# Patient Record
Sex: MALE | Race: WHITE | NOT HISPANIC OR LATINO | Employment: FULL TIME | URBAN - METROPOLITAN AREA
[De-identification: names, ages, dates, MRNs, and addresses within clinical notes are randomized per-mention and may not be internally consistent; named-entity substitution may affect disease eponyms.]

---

## 2017-03-09 NOTE — PROGRESS NOTES
SUBJECTIVE:                                                    Erick Yusuf is a 66 year old male who presents to clinic today for the following health issues:      Hyperlipidemia Follow-Up      Rate your low fat/cholesterol diet?: good    Taking statin?  Yes, no muscle aches from statin    Other lipid medications/supplements?:  none     Hypertension Follow-up      Outpatient blood pressures are not being checked.    Low Salt Diet: no added salt       Amount of exercise or physical activity: None    Problems taking medications regularly: No    Medication side effects: none  Diet: regular (no restrictions)    Recheck of medications:   Simvastatin for cholesterol. Lisinopril and HCTZ for HTN.  Is at goal for blood pressure. No side effects from his medications. Is due for recheck of cholesterol.     Nonsmoker. Blood pressure is to goal today.     No chest pain, shortness of breath, edema, dizziness, orthopnea, or PND.     No vision changes.     Due for fit card.       ENT Symptoms             Symptoms: cc Present Absent Comment   Fever/Chills  x     Fatigue  x     Muscle Aches  x     Eye Irritation  x     Sneezing  x     Nasal John/Drg  x     Sinus Pressure/Pain  x     Loss of smell   x    Dental pain   x    Sore Throat   x    Swollen Glands   x    Ear Pain/Fullness  x     Cough  x     Wheeze  x     Chest Pain   x    Shortness of breath   x    Rash   x    Other   x      Symptom duration:  x 2 weeks   Symptom severity:  mild   Treatments tried:  none   Contacts:  none       Has used z-pack in the past. Cough not any better after two weeks.  Green sputum. Eating and drinking okay.  Has sinus congestion also. Has never needed inhaler. No h/o pneumonia.     Due for fasting labs and fit card.     Has BPH, uses flomax, due for PSA his PCP has been following this also.             Problem list and histories reviewed & adjusted, as indicated.  Additional history: as documented    Patient Active Problem List  "  Diagnosis     HYPERLIPIDEMIA LDL GOAL <130     Hypertension goal BP (blood pressure) < 140/90     Advanced directives, counseling/discussion     Benign non-nodular prostatic hyperplasia without lower urinary tract symptoms     Past Surgical History   Procedure Laterality Date     No history of surgery         Social History   Substance Use Topics     Smoking status: Never Smoker     Smokeless tobacco: Never Used     Alcohol use Yes      Comment: occasionally     Family History   Problem Relation Age of Onset     DIABETES Father      HEART DISEASE Brother          Current Outpatient Prescriptions   Medication Sig Dispense Refill     simvastatin (ZOCOR) 20 MG tablet Take 1 tablet (20 mg) by mouth At Bedtime 90 tablet 1     tamsulosin (FLOMAX) 0.4 MG capsule Take 1 capsule (0.4 mg) by mouth daily 90 capsule 1     hydrochlorothiazide (HYDRODIURIL) 25 MG tablet Take 1 tablet (25 mg) by mouth daily 90 tablet 1     lisinopril (PRINIVIL/ZESTRIL) 5 MG tablet Take 1 tablet (5 mg) by mouth daily 90 tablet 1     azithromycin (ZITHROMAX) 250 MG tablet Two tablets first day, then one tablet daily for four days. 6 tablet 0     multivitamin, therapeutic with minerals (MULTI-VITAMIN) TABS Take 1 tablet by mouth daily.       [DISCONTINUED] simvastatin (ZOCOR) 20 MG tablet Take 1 tablet (20 mg) by mouth At Bedtime 90 tablet 0     [DISCONTINUED] hydrochlorothiazide (HYDRODIURIL) 25 MG tablet Take 1 tablet (25 mg) by mouth daily 90 tablet 0     [DISCONTINUED] lisinopril (PRINIVIL/ZESTRIL) 5 MG tablet Take 1 tablet (5 mg) by mouth daily 90 tablet 0     No Known Allergies    ROS:  Constitutional, HEENT, cardiovascular, pulmonary, gi and gu systems are negative, except as otherwise noted.    OBJECTIVE:                                                    /84  Pulse 71  Temp 97.8  F (36.6  C) (Oral)  Ht 5' 11\" (1.803 m)  Wt 206 lb (93.4 kg)  SpO2 97%  BMI 28.73 kg/m2  Body mass index is 28.73 kg/(m^2).  GENERAL:  No acute distress.  " Interacts appropriately.  Breathing without difficulty.  Alert.  HEENT:  Tympanic membranes intact without effusion or erythema.  Oral mucosa moist. Posterior pharynx has no erythema.  Posterior pharynx has no exudate. Without edema.  NECK:  Soft and supple.  without tenderness.  Without lymphadenopathy.  Normal range of motion.    CARDIAC:   Regular rate and rhythm.  No murmurs, rubs, or gallops.   PULMONARY: Clear to auscultation bilaterally.  No  wheezes, crackles, or rhonchi.  Normal air exchange/breath sounds.  No use of accessory muscles.    PSYCH: Normal affect.  SKIN: No rashes.             ASSESSMENT/PLAN:                                                    ASSESSMENT / PLAN:  (I10) Benign essential hypertension  (primary encounter diagnosis)  Comment: at goal recheck per PCP  Plan: COMPREHENSIVE METABOLIC PANEL            (R05) Cough  Comment:   Plan: azithromycin (ZITHROMAX) 250 MG tablet        Discussed viral versus bacterial, patient would like to treat with z-geni    (E78.00) Hypercholesteremia  Comment:   Plan: Lipid panel reflex to direct LDL, simvastatin         (ZOCOR) 20 MG tablet            (Z12.11) Screen for colon cancer  Comment:   Plan: Fecal colorectal cancer screen FIT - Future         (S+30)            (Z11.59) Need for hepatitis C screening test  Comment:   Plan: Hepatitis C Screen Reflex to HCV RNA Quant and         Genotype            (Z23) Need for prophylactic vaccination against Streptococcus pneumoniae (pneumococcus)  Comment:   Plan:     (N40.0) Benign non-nodular prostatic hyperplasia without lower urinary tract symptoms  Comment:   Plan: tamsulosin (FLOMAX) 0.4 MG capsule            (I10) Hypertension goal BP (blood pressure) < 140/90  Comment:   Plan: hydrochlorothiazide (HYDRODIURIL) 25 MG tablet,        lisinopril (PRINIVIL/ZESTRIL) 5 MG tablet            (Z12.5) Screening for prostate cancer  Comment:   Plan: PSA, screen                MOHSEN Servin  [facility]

## 2017-03-10 ENCOUNTER — OFFICE VISIT (OUTPATIENT)
Dept: FAMILY MEDICINE | Facility: CLINIC | Age: 67
End: 2017-03-10
Payer: COMMERCIAL

## 2017-03-10 VITALS
WEIGHT: 206 LBS | OXYGEN SATURATION: 97 % | HEIGHT: 71 IN | TEMPERATURE: 97.8 F | HEART RATE: 71 BPM | DIASTOLIC BLOOD PRESSURE: 84 MMHG | SYSTOLIC BLOOD PRESSURE: 132 MMHG | BODY MASS INDEX: 28.84 KG/M2

## 2017-03-10 DIAGNOSIS — E78.00 HYPERCHOLESTEREMIA: ICD-10-CM

## 2017-03-10 DIAGNOSIS — R05.9 COUGH: ICD-10-CM

## 2017-03-10 DIAGNOSIS — I10 BENIGN ESSENTIAL HYPERTENSION: Primary | ICD-10-CM

## 2017-03-10 DIAGNOSIS — I10 HYPERTENSION GOAL BP (BLOOD PRESSURE) < 140/90: ICD-10-CM

## 2017-03-10 DIAGNOSIS — Z12.5 SCREENING FOR PROSTATE CANCER: ICD-10-CM

## 2017-03-10 DIAGNOSIS — Z11.59 NEED FOR HEPATITIS C SCREENING TEST: ICD-10-CM

## 2017-03-10 DIAGNOSIS — N40.0 BENIGN NON-NODULAR PROSTATIC HYPERPLASIA WITHOUT LOWER URINARY TRACT SYMPTOMS: ICD-10-CM

## 2017-03-10 DIAGNOSIS — Z12.11 SCREEN FOR COLON CANCER: ICD-10-CM

## 2017-03-10 DIAGNOSIS — Z23 NEED FOR PROPHYLACTIC VACCINATION AGAINST STREPTOCOCCUS PNEUMONIAE (PNEUMOCOCCUS): ICD-10-CM

## 2017-03-10 LAB
ALBUMIN SERPL-MCNC: 3.7 G/DL (ref 3.4–5)
ALP SERPL-CCNC: 79 U/L (ref 40–150)
ALT SERPL W P-5'-P-CCNC: 25 U/L (ref 0–70)
ANION GAP SERPL CALCULATED.3IONS-SCNC: 6 MMOL/L (ref 3–14)
AST SERPL W P-5'-P-CCNC: 17 U/L (ref 0–45)
BILIRUB SERPL-MCNC: 0.7 MG/DL (ref 0.2–1.3)
BUN SERPL-MCNC: 21 MG/DL (ref 7–30)
CALCIUM SERPL-MCNC: 9 MG/DL (ref 8.5–10.1)
CHLORIDE SERPL-SCNC: 108 MMOL/L (ref 94–109)
CHOLEST SERPL-MCNC: 162 MG/DL
CO2 SERPL-SCNC: 29 MMOL/L (ref 20–32)
CREAT SERPL-MCNC: 1.28 MG/DL (ref 0.66–1.25)
GFR SERPL CREATININE-BSD FRML MDRD: 56 ML/MIN/1.7M2
GLUCOSE SERPL-MCNC: 108 MG/DL (ref 70–99)
HCV AB SERPL QL IA: NORMAL
HDLC SERPL-MCNC: 57 MG/DL
LDLC SERPL CALC-MCNC: 91 MG/DL
NONHDLC SERPL-MCNC: 105 MG/DL
POTASSIUM SERPL-SCNC: 4.1 MMOL/L (ref 3.4–5.3)
PROT SERPL-MCNC: 6.6 G/DL (ref 6.8–8.8)
PSA SERPL-ACNC: 4.23 UG/L (ref 0–4)
SODIUM SERPL-SCNC: 143 MMOL/L (ref 133–144)
TRIGL SERPL-MCNC: 69 MG/DL

## 2017-03-10 PROCEDURE — 99214 OFFICE O/P EST MOD 30 MIN: CPT | Performed by: PHYSICIAN ASSISTANT

## 2017-03-10 PROCEDURE — G0103 PSA SCREENING: HCPCS | Performed by: PHYSICIAN ASSISTANT

## 2017-03-10 PROCEDURE — 86803 HEPATITIS C AB TEST: CPT | Performed by: PHYSICIAN ASSISTANT

## 2017-03-10 PROCEDURE — 80053 COMPREHEN METABOLIC PANEL: CPT | Performed by: PHYSICIAN ASSISTANT

## 2017-03-10 PROCEDURE — 36415 COLL VENOUS BLD VENIPUNCTURE: CPT | Performed by: PHYSICIAN ASSISTANT

## 2017-03-10 PROCEDURE — 80061 LIPID PANEL: CPT | Performed by: PHYSICIAN ASSISTANT

## 2017-03-10 RX ORDER — LISINOPRIL 5 MG/1
5 TABLET ORAL DAILY
Qty: 90 TABLET | Refills: 1 | Status: SHIPPED | OUTPATIENT
Start: 2017-03-10 | End: 2017-03-13

## 2017-03-10 RX ORDER — TAMSULOSIN HYDROCHLORIDE 0.4 MG/1
0.4 CAPSULE ORAL DAILY
Qty: 90 CAPSULE | Refills: 1 | Status: SHIPPED | OUTPATIENT
Start: 2017-03-10 | End: 2017-03-13

## 2017-03-10 RX ORDER — HYDROCHLOROTHIAZIDE 25 MG/1
25 TABLET ORAL DAILY
Qty: 90 TABLET | Refills: 1 | Status: SHIPPED | OUTPATIENT
Start: 2017-03-10 | End: 2017-03-13

## 2017-03-10 RX ORDER — AZITHROMYCIN 250 MG/1
TABLET, FILM COATED ORAL
Qty: 6 TABLET | Refills: 0 | Status: SHIPPED | OUTPATIENT
Start: 2017-03-10 | End: 2017-06-23

## 2017-03-10 RX ORDER — SIMVASTATIN 20 MG
20 TABLET ORAL AT BEDTIME
Qty: 90 TABLET | Refills: 1 | Status: SHIPPED | OUTPATIENT
Start: 2017-03-10 | End: 2017-03-13

## 2017-03-10 NOTE — NURSING NOTE
"Chief Complaint   Patient presents with     Hypertension     medication check and refill     Cough     coughing per pt x 2 weeks now        Initial /84  Pulse 71  Temp 97.8  F (36.6  C) (Oral)  Ht 5' 11\" (1.803 m)  Wt 206 lb (93.4 kg)  SpO2 97%  BMI 28.73 kg/m2 Estimated body mass index is 28.73 kg/(m^2) as calculated from the following:    Height as of this encounter: 5' 11\" (1.803 m).    Weight as of this encounter: 206 lb (93.4 kg).  Medication Reconciliation: complete      Natasha Kaur MA    "

## 2017-03-10 NOTE — MR AVS SNAPSHOT
After Visit Summary   3/10/2017    Erick Yusuf    MRN: [id number]           Patient Information     Date Of Birth          1950        Visit Information        Provider Department      3/10/2017 7:20 AM Vida Singleton PA-C [facility]        Today's Diagnoses     Benign essential hypertension    -  1    Cough        Hypercholesteremia        Screen for colon cancer        Need for hepatitis C screening test        Need for prophylactic vaccination against Streptococcus pneumoniae (pneumococcus)        Benign non-nodular prostatic hyperplasia without lower urinary tract symptoms        Hypertension goal BP (blood pressure) < 140/90        Screening for prostate cancer           Follow-ups after your visit        Future tests that were ordered for you today     Open Future Orders        Priority Expected Expires Ordered    Fecal colorectal cancer screen FIT - Future (S+30) Routine 3/30/2017 4/8/2017 3/10/2017            Who to contact     If you have questions or need follow up information about today's clinic visit or your schedule please contact [facility] directly at [phone].  Normal or non-critical lab and imaging results will be communicated to you by Doctor Funhart, letter or phone within 4 business days after the clinic has received the results. If you do not hear from us within 7 days, please contact the clinic through [employer] or phone. If you have a critical or abnormal lab result, we will notify you by phone as soon as possible.  Submit refill requests through [employer] or call your pharmacy and they will forward the refill request to us. Please allow 3 business days for your refill to be completed.          Additional Information About Your Visit        MyChart Information     [employer] gives you secure access to your electronic health record. If you see a primary care provider, you can also send messages to your care team and make appointments. If  "you have questions, please call your primary care clinic.  If you do not have a primary care provider, please call [phone] and they will assist you.        Care EveryWhere ID     This is your Care EveryWhere ID. This could be used by other organizations to access your [facility] medical records  [id number]        Your Vitals Were     Pulse Temperature Height Pulse Oximetry BMI (Body Mass Index)       71 97.8  F (36.6  C) (Oral) 5' 11\" (1.803 m) 97% 28.73 kg/m2        Blood Pressure from Last 3 Encounters:   03/10/17 132/84   04/19/16 135/80   12/10/15 133/75    Weight from Last 3 Encounters:   03/10/17 206 lb (93.4 kg)   04/19/16 203 lb 3.2 oz (92.2 kg)   12/10/15 200 lb (90.7 kg)              We Performed the Following     COMPREHENSIVE METABOLIC PANEL     Hepatitis C Screen Reflex to HCV RNA Quant and Genotype     Lipid panel reflex to direct LDL     PSA, screen          Today's Medication Changes          These changes are accurate as of: 3/10/17  7:25 AM.  If you have any questions, ask your nurse or doctor.               Start taking these medicines.        Dose/Directions    azithromycin 250 MG tablet   Commonly known as:  ZITHROMAX   Used for:  Cough   Started by:  Vida Singleton PA-C        Two tablets first day, then one tablet daily for four days.   Quantity:  6 tablet   Refills:  0            Where to get your medicines      These medications were sent to [place] [address] [place], [place] - [address] [facility]  [address] [facility] [address]     Phone:  [phone]     azithromycin 250 MG tablet    hydrochlorothiazide 25 MG tablet    lisinopril 5 MG tablet    simvastatin 20 MG tablet    tamsulosin 0.4 MG capsule                Primary Care Provider Office Phone # Fax #    Sancho Rushing [phone]       [facility] [address] [facility] [address]        Thank you!     Thank you for choosing [facility]  for your " care. Our goal is always to provide you with excellent care. Hearing back from our patients is one way we can continue to improve our services. Please take a few minutes to complete the written survey that you may receive in the mail after your visit with us. Thank you!             Your Updated Medication List - Protect others around you: Learn how to safely use, store and throw away your medicines at www.disposemymeds.org.          This list is accurate as of: 3/10/17  7:25 AM.  Always use your most recent med list.                   Brand Name Dispense Instructions for use    azithromycin 250 MG tablet    ZITHROMAX    6 tablet    Two tablets first day, then one tablet daily for four days.       hydrochlorothiazide 25 MG tablet    HYDRODIURIL    90 tablet    Take 1 tablet (25 mg) by mouth daily       lisinopril 5 MG tablet    PRINIVIL/ZESTRIL    90 tablet    Take 1 tablet (5 mg) by mouth daily       Multi-vitamin Tabs tablet      Take 1 tablet by mouth daily.       simvastatin 20 MG tablet    ZOCOR    90 tablet    Take 1 tablet (20 mg) by mouth At Bedtime       tamsulosin 0.4 MG capsule    FLOMAX    90 capsule    Take 1 capsule (0.4 mg) by mouth daily

## 2017-03-11 NOTE — PROGRESS NOTES
PLEASE CALL PATIENT:   Dear Erick,      It was a pleasure to see you at your recent office visit.  Your test results are listed below.  Hepatitis C screen negative. Prostate screen looks very stable. Blood sugar is about the same, just mildly elevated. You do not have diabetes.  Kidney function was decreased somewhat, this can be if you did not drink a lot of water before your bloodwork. Please recheck kidney function after drinking lots of water in about a month. Future lab was placed. Liver enzymes normal. Cholesterol looks GREAT.         If you have any questions or concerns, please call the clinic at [phone].    Sincerely,  Vida Singleton PA-C

## 2017-03-13 ENCOUNTER — TELEPHONE (OUTPATIENT)
Dept: FAMILY MEDICINE | Facility: CLINIC | Age: 67
End: 2017-03-13

## 2017-03-13 NOTE — TELEPHONE ENCOUNTER
So states that patient's tamsulosin, hydrocholothyazide, lisinopril, and simvistatin were all supposed to go to [facility] at [phone].    Thank you.

## 2017-03-13 NOTE — TELEPHONE ENCOUNTER
Notes Recorded by Vida Singleton PA-C on 3/10/2017 at 6:12 PM  PLEASE CALL PATIENT:   Dear Erick,      It was a pleasure to see you at your recent office visit.  Your test results are listed below.  Hepatitis C screen negative. Prostate screen looks very stable. Blood sugar is about the same, just mildly elevated. You do not have diabetes.  Kidney function was decreased somewhat, this can be if you did not drink a lot of water before your bloodwork. Please recheck kidney function after drinking lots of water in about a month. Future lab was placed. Liver enzymes normal. Cholesterol looks GREAT.         If you have any questions or concerns, please call the clinic at [phone].    Sincerely,  Vida Singleton PA-C

## 2017-03-14 NOTE — TELEPHONE ENCOUNTER
Consent on file to speak with wife, Kaley.   Informed Kaley of result note as below and to have patient drink lots of water - 6-8 full glasses of water/day.   Recheck kidney function in 1 month  Kaley verbalized understanding.  .Felisha CONNERN, RN, CPN

## 2017-03-21 ENCOUNTER — TELEPHONE (OUTPATIENT)
Dept: FAMILY MEDICINE | Facility: CLINIC | Age: 67
End: 2017-03-21

## 2017-03-21 NOTE — TELEPHONE ENCOUNTER
Panel Management Review      Patient has the following on his problem list:     Hypertension   Last three blood pressure readings:  BP Readings from Last 3 Encounters:   03/10/17 132/84   04/19/16 135/80   12/10/15 133/75     Blood pressure: Passed    HTN Guidelines:  Age 18-59 BP range:  Less than 140/90  Age 60-85 with Diabetes:  Less than 140/90  Age 60-85 without Diabetes:  less than 150/90      Composite cancer screening  Chart review shows that this patient is due/due soon for the following Fecal Colorectal (FIT)  Summary:    Patient is due/failing the following:   FIT    Action needed:   Patient needs non-fasting lab only appointment    Type of outreach:    None. Fit test just ordered 3/10/17    Questions for provider review:    None                                                                                                                                    Luis Manuel Stafford CMA       Chart routed to closed .

## 2017-06-23 ENCOUNTER — OFFICE VISIT (OUTPATIENT)
Dept: FAMILY MEDICINE | Facility: CLINIC | Age: 67
End: 2017-06-23
Payer: COMMERCIAL

## 2017-06-23 VITALS
OXYGEN SATURATION: 96 % | BODY MASS INDEX: 28.17 KG/M2 | DIASTOLIC BLOOD PRESSURE: 70 MMHG | SYSTOLIC BLOOD PRESSURE: 122 MMHG | HEART RATE: 66 BPM | TEMPERATURE: 98.5 F | WEIGHT: 202 LBS

## 2017-06-23 DIAGNOSIS — R05.9 COUGH: ICD-10-CM

## 2017-06-23 PROCEDURE — 99213 OFFICE O/P EST LOW 20 MIN: CPT | Performed by: FAMILY MEDICINE

## 2017-06-23 RX ORDER — AZITHROMYCIN 250 MG/1
TABLET, FILM COATED ORAL
Qty: 6 TABLET | Refills: 0 | Status: SHIPPED | OUTPATIENT
Start: 2017-06-23 | End: 2017-06-29

## 2017-06-23 RX ORDER — CODEINE PHOSPHATE AND GUAIFENESIN 10; 100 MG/5ML; MG/5ML
1 SOLUTION ORAL
Qty: 120 ML | Refills: 0 | Status: SHIPPED | OUTPATIENT
Start: 2017-06-23 | End: 2017-11-07

## 2017-06-23 NOTE — NURSING NOTE
"Chief Complaint   Patient presents with     Cough       Initial /70  Pulse 66  Temp 98.5  F (36.9  C) (Oral)  Wt 202 lb (91.6 kg)  SpO2 96%  BMI 28.17 kg/m2 Estimated body mass index is 28.17 kg/(m^2) as calculated from the following:    Height as of 3/10/17: 5' 11\" (1.803 m).    Weight as of this encounter: 202 lb (91.6 kg).  Medication Reconciliation: complete   Susna Smith CMA    "
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
## 2017-06-23 NOTE — MR AVS SNAPSHOT
After Visit Summary   6/23/2017    Erick Yusuf    MRN: [id number]           Patient Information     Date Of Birth          1950        Visit Information        Provider Department      6/23/2017 4:00 PM Mehdi De Leon MD [facility]        Today's Diagnoses     Cough           Follow-ups after your visit        Additional Services     ALLERGY/ASTHMA ADULT REFERRAL       Your provider has referred you to: [facility]: [facility]  [phone] [email or web address]    Please be aware that coverage of these services is subject to the terms and limitations of your health insurance plan.  Call member services at your health plan with any benefit or coverage questions.      Please bring the following with you to your appointment:    (1) Any X-Rays, CTs or MRIs which have been performed.  Contact the facility where they were done to arrange for  prior to your scheduled appointment.    (2) List of current medications  (3) This referral request   (4) Any documents/labs given to you for this referral                  Who to contact     If you have questions or need follow up information about today's clinic visit or your schedule please contact [facility] directly at [phone].  Normal or non-critical lab and imaging results will be communicated to you by MyChart, letter or phone within 4 business days after the clinic has received the results. If you do not hear from us within 7 days, please contact the clinic through MyChart or phone. If you have a critical or abnormal lab result, we will notify you by phone as soon as possible.  Submit refill requests through [employer] or call your pharmacy and they will forward the refill request to us. Please allow 3 business days for your refill to be completed.          Additional Information About Your Visit        MyChart Information     [employer] gives you secure access to your  electronic health record. If you see a primary care provider, you can also send messages to your care team and make appointments. If you have questions, please call your primary care clinic.  If you do not have a primary care provider, please call [phone] and they will assist you.        Care EveryWhere ID     This is your Care EveryWhere ID. This could be used by other organizations to access your [facility] medical records  [id number]        Your Vitals Were     Pulse Temperature Pulse Oximetry BMI (Body Mass Index)          66 98.5  F (36.9  C) (Oral) 96% 28.17 kg/m2         Blood Pressure from Last 3 Encounters:   06/23/17 122/70   03/10/17 132/84   04/19/16 135/80    Weight from Last 3 Encounters:   06/23/17 202 lb (91.6 kg)   03/10/17 206 lb (93.4 kg)   04/19/16 203 lb 3.2 oz (92.2 kg)              We Performed the Following     ALLERGY/ASTHMA ADULT REFERRAL          Today's Medication Changes          These changes are accurate as of: 6/23/17  5:38 PM.  If you have any questions, ask your nurse or doctor.               Start taking these medicines.        Dose/Directions    guaiFENesin-codeine 100-10 MG/5ML Soln solution   Commonly known as:  ROBITUSSIN AC   Used for:  Cough   Started by:  Mehdi De Leon MD        Dose:  1 tsp.   Take 5 mLs by mouth nightly as needed   Quantity:  120 mL   Refills:  0            Where to get your medicines      These medications were sent to [place] [address] [place] [facility] [address] [facility]  [address] [facility] [address]     Phone:  [phone]     azithromycin 250 MG tablet         Some of these will need a paper prescription and others can be bought over the counter.  Ask your nurse if you have questions.     Bring a paper prescription for each of these medications     guaiFENesin-codeine 100-10 MG/5ML Soln solution                Primary Care Provider Office Phone # Fax #    Sancho Rushing [phone]       [place]  [facility] [address] [facility] [address]        Equal Access to Services     MALDONADO ROACH : Hadii dony martinez hadpujao Soveraali, waaxda luqadaha, qaybta kaalmada margotericmary grace, jayashree berry haymelodie albertsdelorestyler fields. So [facility] [phone].    ATENCIÓN: Si habla español, tiene a swift disposición servicios gratuitos de asistencia lingüística. Llame al [phone].    We comply with applicable federal civil rights laws and [place] laws. We do not discriminate on the basis of race, color, national origin, age, disability sex, sexual orientation or gender identity.            Thank you!     Thank you for choosing [facility]  for your care. Our goal is always to provide you with excellent care. Hearing back from our patients is one way we can continue to improve our services. Please take a few minutes to complete the written survey that you may receive in the mail after your visit with us. Thank you!             Your Updated Medication List - Protect others around you: Learn how to safely use, store and throw away your medicines at www.disposemymeds.org.          This list is accurate as of: 6/23/17  5:38 PM.  Always use your most recent med list.                   Brand Name Dispense Instructions for use Diagnosis    azithromycin 250 MG tablet    ZITHROMAX    6 tablet    Two tablets first day, then one tablet daily for four days.    Cough       guaiFENesin-codeine 100-10 MG/5ML Soln solution    ROBITUSSIN AC    120 mL    Take 5 mLs by mouth nightly as needed    Cough       hydrochlorothiazide 25 MG tablet    HYDRODIURIL    90 tablet    Take 1 tablet (25 mg) by mouth daily    Hypertension goal BP (blood pressure) < 140/90       lisinopril 5 MG tablet    PRINIVIL/ZESTRIL    90 tablet    Take 1 tablet (5 mg) by mouth daily    Hypertension goal BP (blood pressure) < 140/90       Multi-vitamin Tabs tablet      Take 1 tablet by mouth daily.        simvastatin 20 MG tablet    ZOCOR    90 tablet    Take 1  tablet (20 mg) by mouth At Bedtime    Hypercholesteremia       tamsulosin 0.4 MG capsule    FLOMAX    90 capsule    Take 1 capsule (0.4 mg) by mouth daily    Benign non-nodular prostatic hyperplasia without lower urinary tract symptoms

## 2017-06-29 DIAGNOSIS — R05.9 COUGH: ICD-10-CM

## 2017-06-29 RX ORDER — AZITHROMYCIN 250 MG/1
TABLET, FILM COATED ORAL
Qty: 6 TABLET | Refills: 0 | Status: SHIPPED | OUTPATIENT
Start: 2017-06-29 | End: 2017-11-07

## 2017-09-28 DIAGNOSIS — E78.00 HYPERCHOLESTEREMIA: ICD-10-CM

## 2017-09-28 DIAGNOSIS — N40.0 BENIGN NON-NODULAR PROSTATIC HYPERPLASIA WITHOUT LOWER URINARY TRACT SYMPTOMS: ICD-10-CM

## 2017-09-28 DIAGNOSIS — I10 HYPERTENSION GOAL BP (BLOOD PRESSURE) < 140/90: ICD-10-CM

## 2017-09-28 RX ORDER — TAMSULOSIN HYDROCHLORIDE 0.4 MG/1
0.4 CAPSULE ORAL DAILY
Qty: 90 CAPSULE | Refills: 1 | Status: SHIPPED | OUTPATIENT
Start: 2017-09-28 | End: 2017-11-07

## 2017-09-28 RX ORDER — LISINOPRIL 5 MG/1
5 TABLET ORAL DAILY
Qty: 30 TABLET | Refills: 0 | Status: SHIPPED | OUTPATIENT
Start: 2017-09-28 | End: 2017-11-07 | Stop reason: ALTCHOICE

## 2017-09-28 RX ORDER — SIMVASTATIN 20 MG
20 TABLET ORAL AT BEDTIME
Qty: 90 TABLET | Refills: 1 | Status: SHIPPED | OUTPATIENT
Start: 2017-09-28 | End: 2017-11-07

## 2017-09-28 RX ORDER — HYDROCHLOROTHIAZIDE 25 MG/1
25 TABLET ORAL DAILY
Qty: 30 TABLET | Refills: 0 | Status: SHIPPED | OUTPATIENT
Start: 2017-09-28 | End: 2017-11-07

## 2017-09-28 NOTE — LETTER
September 29, 2017    Erick Yusuf  [address] [facility] [address]    Dear Erick,       We recently received a refill request for hydrochlorothiazide, lisinopril, simvastatin and tamsulosin.  We have refilled this for a one time 30 day supply only because you are due for a:    Blood pressure office visit with provider and fasting lab appointment      Please schedule this lab appointment 4-5 days prior to the office visit.     Please call at your earliest convenience so that there will not be a delay with your future refills.          Thank you,   Your [facility] Team/  [phone]

## 2017-11-07 ENCOUNTER — OFFICE VISIT (OUTPATIENT)
Dept: FAMILY MEDICINE | Facility: CLINIC | Age: 67
End: 2017-11-07
Payer: COMMERCIAL

## 2017-11-07 VITALS
DIASTOLIC BLOOD PRESSURE: 75 MMHG | WEIGHT: 204.8 LBS | SYSTOLIC BLOOD PRESSURE: 135 MMHG | TEMPERATURE: 97 F | HEART RATE: 63 BPM | BODY MASS INDEX: 28.56 KG/M2

## 2017-11-07 DIAGNOSIS — I10 HYPERTENSION GOAL BP (BLOOD PRESSURE) < 140/90: ICD-10-CM

## 2017-11-07 DIAGNOSIS — Z12.11 SPECIAL SCREENING FOR MALIGNANT NEOPLASMS, COLON: Primary | ICD-10-CM

## 2017-11-07 DIAGNOSIS — N40.0 BENIGN NON-NODULAR PROSTATIC HYPERPLASIA WITHOUT LOWER URINARY TRACT SYMPTOMS: ICD-10-CM

## 2017-11-07 DIAGNOSIS — E78.00 HYPERCHOLESTEREMIA: ICD-10-CM

## 2017-11-07 LAB
ANION GAP SERPL CALCULATED.3IONS-SCNC: 6 MMOL/L (ref 3–14)
BUN SERPL-MCNC: 24 MG/DL (ref 7–30)
CALCIUM SERPL-MCNC: 9.5 MG/DL (ref 8.5–10.1)
CHLORIDE SERPL-SCNC: 106 MMOL/L (ref 94–109)
CO2 SERPL-SCNC: 30 MMOL/L (ref 20–32)
CREAT SERPL-MCNC: 1.25 MG/DL (ref 0.66–1.25)
GFR SERPL CREATININE-BSD FRML MDRD: 58 ML/MIN/1.7M2
GLUCOSE SERPL-MCNC: 99 MG/DL (ref 70–99)
POTASSIUM SERPL-SCNC: 4.2 MMOL/L (ref 3.4–5.3)
SODIUM SERPL-SCNC: 142 MMOL/L (ref 133–144)

## 2017-11-07 PROCEDURE — 80048 BASIC METABOLIC PNL TOTAL CA: CPT | Performed by: FAMILY MEDICINE

## 2017-11-07 PROCEDURE — 36415 COLL VENOUS BLD VENIPUNCTURE: CPT | Performed by: FAMILY MEDICINE

## 2017-11-07 PROCEDURE — 99214 OFFICE O/P EST MOD 30 MIN: CPT | Performed by: FAMILY MEDICINE

## 2017-11-07 RX ORDER — LISINOPRIL 5 MG/1
5 TABLET ORAL DAILY
Qty: 30 TABLET | Refills: 0 | Status: CANCELLED | OUTPATIENT
Start: 2017-11-07

## 2017-11-07 RX ORDER — TAMSULOSIN HYDROCHLORIDE 0.4 MG/1
0.4 CAPSULE ORAL DAILY
Qty: 90 CAPSULE | Refills: 1 | Status: SHIPPED | OUTPATIENT
Start: 2017-11-07 | End: 2018-06-28

## 2017-11-07 RX ORDER — HYDROCHLOROTHIAZIDE 25 MG/1
25 TABLET ORAL DAILY
Qty: 90 TABLET | Refills: 1 | Status: SHIPPED | OUTPATIENT
Start: 2017-11-07 | End: 2018-06-28

## 2017-11-07 RX ORDER — LOSARTAN POTASSIUM 25 MG/1
25 TABLET ORAL DAILY
Qty: 90 TABLET | Refills: 1 | Status: SHIPPED | OUTPATIENT
Start: 2017-11-07 | End: 2018-06-28

## 2017-11-07 RX ORDER — SIMVASTATIN 20 MG
20 TABLET ORAL AT BEDTIME
Qty: 90 TABLET | Refills: 1 | Status: SHIPPED | OUTPATIENT
Start: 2017-11-07 | End: 2018-06-28

## 2017-11-07 RX ORDER — FINASTERIDE 5 MG/1
5 TABLET, FILM COATED ORAL DAILY
Qty: 90 TABLET | Refills: 1 | Status: SHIPPED | OUTPATIENT
Start: 2017-11-07 | End: 2018-06-28

## 2017-11-07 NOTE — NURSING NOTE
"Chief Complaint   Patient presents with     Lipids     Hypertension       Initial /87  Pulse 63  Temp 97  F (36.1  C) (Oral)  Wt 204 lb 12.8 oz (92.9 kg)  BMI 28.56 kg/m2 Estimated body mass index is 28.56 kg/(m^2) as calculated from the following:    Height as of 3/10/17: 5' 11\" (1.803 m).    Weight as of this encounter: 204 lb 12.8 oz (92.9 kg).  Medication Reconciliation: complete  Luis Manuel Stafford CMA    "

## 2017-11-07 NOTE — PROGRESS NOTES
SUBJECTIVE:  66 year oldyear old male enters with a hx of hypertension.  Pt. Has been compliant with medications and medications were reviewed.  Cough is side effect.. No chest pain or sob. Low sodium diet.    Current Outpatient Prescriptions:      hydrochlorothiazide (HYDRODIURIL) 25 MG tablet, Take 1 tablet (25 mg) by mouth daily, Disp: 90 tablet, Rfl: 1     tamsulosin (FLOMAX) 0.4 MG capsule, Take 1 capsule (0.4 mg) by mouth daily, Disp: 90 capsule, Rfl: 1     simvastatin (ZOCOR) 20 MG tablet, Take 1 tablet (20 mg) by mouth At Bedtime, Disp: 90 tablet, Rfl: 1     finasteride (PROSCAR) 5 MG tablet, Take 1 tablet (5 mg) by mouth daily, Disp: 90 tablet, Rfl: 1     losartan (COZAAR) 25 MG tablet, Take 1 tablet (25 mg) by mouth daily, Disp: 90 tablet, Rfl: 1     lisinopril (PRINIVIL/ZESTRIL) 5 MG tablet, Take 1 tablet (5 mg) by mouth daily, Disp: 30 tablet, Rfl: 0     multivitamin, therapeutic with minerals (MULTI-VITAMIN) TABS, Take 1 tablet by mouth daily., Disp: , Rfl:      [DISCONTINUED] hydrochlorothiazide (HYDRODIURIL) 25 MG tablet, Take 1 tablet (25 mg) by mouth daily, Disp: 30 tablet, Rfl: 0     [DISCONTINUED] simvastatin (ZOCOR) 20 MG tablet, Take 1 tablet (20 mg) by mouth At Bedtime, Disp: 90 tablet, Rfl: 1  Past Medical History:   Diagnosis Date     HTN (hypertension)      Hypercholesteremia      Leucopenia      Office Visit on 03/10/2017   Component Date Value Ref Range Status     Hepatitis C Antibody 03/10/2017   NR Final                    Value:Nonreactive   Assay performance characteristics have not been established for newborns,   infants, and children       Sodium 03/10/2017 143  133 - 144 mmol/L Final     Potassium 03/10/2017 4.1  3.4 - 5.3 mmol/L Final     Chloride 03/10/2017 108  94 - 109 mmol/L Final     Carbon Dioxide 03/10/2017 29  20 - 32 mmol/L Final     Anion Gap 03/10/2017 6  3 - 14 mmol/L Final     Glucose 03/10/2017 108* 70 - 99 mg/dL Final    Fasting specimen     Urea Nitrogen 03/10/2017  21  7 - 30 mg/dL Final     Creatinine 03/10/2017 1.28* 0.66 - 1.25 mg/dL Final     GFR Estimate 03/10/2017 56* >60 mL/min/1.7m2 Final    Non  GFR Calc     GFR Estimate If Black 03/10/2017 68  >60 mL/min/1.7m2 Final    African American GFR Calc     Calcium 03/10/2017 9.0  8.5 - 10.1 mg/dL Final     Bilirubin Total 03/10/2017 0.7  0.2 - 1.3 mg/dL Final     Albumin 03/10/2017 3.7  3.4 - 5.0 g/dL Final     Protein Total 03/10/2017 6.6* 6.8 - 8.8 g/dL Final     Alkaline Phosphatase 03/10/2017 79  40 - 150 U/L Final     ALT 03/10/2017 25  0 - 70 U/L Final     AST 03/10/2017 17  0 - 45 U/L Final     Cholesterol 03/10/2017 162  <200 mg/dL Final     Triglycerides 03/10/2017 69  <150 mg/dL Final    Fasting specimen     HDL Cholesterol 03/10/2017 57  >39 mg/dL Final     LDL Cholesterol Calculated 03/10/2017 91  <100 mg/dL Final    Desirable:       <100 mg/dl     Non HDL Cholesterol 03/10/2017 105  <130 mg/dL Final     PSA 03/10/2017 4.23* 0 - 4 ug/L Final    Assay Method:  Chemiluminescence using Siemens Vista analyzer      Reviewed health maintenance  Patient Active Problem List   Diagnosis     HYPERLIPIDEMIA LDL GOAL <130     Hypertension goal BP (blood pressure) < 140/90     Advanced directives, counseling/discussion     Benign non-nodular prostatic hyperplasia without lower urinary tract symptoms         OBJECTIVE:  no apparent distress  /75  Pulse 63  Temp 97  F (36.1  C) (Oral)  Wt 204 lb 12.8 oz (92.9 kg)  BMI 28.56 kg/m2     Head: Normocephalic. No masses, lesions, tenderness or abnormalities.  Neck::Neck supple. No adenopathy. Thyroid symmetric, normal size.    Cardiovascular: negative. No lifts, heaves, or thrills. RRR. No murmurs, clicks gallops or rubs  Respiratory. Good diaphragmatic excursion. Lungs clear  Gastrointestinal:Abdomen soft, non-tender.  No masses, organomegaly    Office Visit on 03/10/2017   Component Date Value Ref Range Status     Hepatitis C Antibody 03/10/2017   NR Final                     Value:Nonreactive   Assay performance characteristics have not been established for newborns,   infants, and children       Sodium 03/10/2017 143  133 - 144 mmol/L Final     Potassium 03/10/2017 4.1  3.4 - 5.3 mmol/L Final     Chloride 03/10/2017 108  94 - 109 mmol/L Final     Carbon Dioxide 03/10/2017 29  20 - 32 mmol/L Final     Anion Gap 03/10/2017 6  3 - 14 mmol/L Final     Glucose 03/10/2017 108* 70 - 99 mg/dL Final    Fasting specimen     Urea Nitrogen 03/10/2017 21  7 - 30 mg/dL Final     Creatinine 03/10/2017 1.28* 0.66 - 1.25 mg/dL Final     GFR Estimate 03/10/2017 56* >60 mL/min/1.7m2 Final    Non  GFR Calc     GFR Estimate If Black 03/10/2017 68  >60 mL/min/1.7m2 Final    African American GFR Calc     Calcium 03/10/2017 9.0  8.5 - 10.1 mg/dL Final     Bilirubin Total 03/10/2017 0.7  0.2 - 1.3 mg/dL Final     Albumin 03/10/2017 3.7  3.4 - 5.0 g/dL Final     Protein Total 03/10/2017 6.6* 6.8 - 8.8 g/dL Final     Alkaline Phosphatase 03/10/2017 79  40 - 150 U/L Final     ALT 03/10/2017 25  0 - 70 U/L Final     AST 03/10/2017 17  0 - 45 U/L Final     Cholesterol 03/10/2017 162  <200 mg/dL Final     Triglycerides 03/10/2017 69  <150 mg/dL Final    Fasting specimen     HDL Cholesterol 03/10/2017 57  >39 mg/dL Final     LDL Cholesterol Calculated 03/10/2017 91  <100 mg/dL Final    Desirable:       <100 mg/dl     Non HDL Cholesterol 03/10/2017 105  <130 mg/dL Final     PSA 03/10/2017 4.23* 0 - 4 ug/L Final    Assay Method:  Chemiluminescence using Siemens Vista analyzer         ICD-10-CM    1. Special screening for malignant neoplasms, colon Z12.11 Fecal colorectal cancer screen FIT - Future (S+30)   2. Hypertension goal BP (blood pressure) < 140/90 I10 hydrochlorothiazide (HYDRODIURIL) 25 MG tablet     losartan (COZAAR) 25 MG tablet     Basic metabolic panel     Basic metabolic panel   3. Benign non-nodular prostatic hyperplasia without lower urinary tract symptoms N40.0  tamsulosin (FLOMAX) 0.4 MG capsule     finasteride (PROSCAR) 5 MG tablet   4. Hypercholesteremia E78.00 simvastatin (ZOCOR) 20 MG tablet    PLAN: Follow up in 6 months       SUBJECTIVE:  66 year old enters for recheck of high cholesterol.  Pt. Has been taking med and has no side effects. Pt is following diet.  Denies chest pain and SOB.  Past Medical History:   Diagnosis Date     HTN (hypertension)      Hypercholesteremia      Leucopenia      Past Surgical History:   Procedure Laterality Date     NO HISTORY OF SURGERY         Current Outpatient Prescriptions:      hydrochlorothiazide (HYDRODIURIL) 25 MG tablet, Take 1 tablet (25 mg) by mouth daily, Disp: 90 tablet, Rfl: 1     tamsulosin (FLOMAX) 0.4 MG capsule, Take 1 capsule (0.4 mg) by mouth daily, Disp: 90 capsule, Rfl: 1     simvastatin (ZOCOR) 20 MG tablet, Take 1 tablet (20 mg) by mouth At Bedtime, Disp: 90 tablet, Rfl: 1     finasteride (PROSCAR) 5 MG tablet, Take 1 tablet (5 mg) by mouth daily, Disp: 90 tablet, Rfl: 1     losartan (COZAAR) 25 MG tablet, Take 1 tablet (25 mg) by mouth daily, Disp: 90 tablet, Rfl: 1     lisinopril (PRINIVIL/ZESTRIL) 5 MG tablet, Take 1 tablet (5 mg) by mouth daily, Disp: 30 tablet, Rfl: 0     multivitamin, therapeutic with minerals (MULTI-VITAMIN) TABS, Take 1 tablet by mouth daily., Disp: , Rfl:      [DISCONTINUED] hydrochlorothiazide (HYDRODIURIL) 25 MG tablet, Take 1 tablet (25 mg) by mouth daily, Disp: 30 tablet, Rfl: 0     [DISCONTINUED] simvastatin (ZOCOR) 20 MG tablet, Take 1 tablet (20 mg) by mouth At Bedtime, Disp: 90 tablet, Rfl: 1  Reviewed health maintenance   Patient Active Problem List   Diagnosis     HYPERLIPIDEMIA LDL GOAL <130     Hypertension goal BP (blood pressure) < 140/90     Advanced directives, counseling/discussion     Benign non-nodular prostatic hyperplasia without lower urinary tract symptoms       OBJECTIVE:  no apparent distress  /75  Pulse 63  Temp 97  F (36.1  C) (Oral)  Wt 204 lb 12.8  oz (92.9 kg)  BMI 28.56 kg/m2      Exam:  Constitutional: healthy, alert and no distress  Head: Normocephalic. No masses, lesions, tenderness or abnormalities  Neck: Neck supple. No adenopathy. Thyroid symmetric, normal size,  Cardiovascular: negative, PMI normal. No lifts, heaves, or thrills. RRR. No murmurs, clicks gallops or rub  Respiratory: negative Lungs clear    Office Visit on 03/10/2017   Component Date Value Ref Range Status     Hepatitis C Antibody 03/10/2017   NR Final                    Value:Nonreactive   Assay performance characteristics have not been established for newborns,   infants, and children       Sodium 03/10/2017 143  133 - 144 mmol/L Final     Potassium 03/10/2017 4.1  3.4 - 5.3 mmol/L Final     Chloride 03/10/2017 108  94 - 109 mmol/L Final     Carbon Dioxide 03/10/2017 29  20 - 32 mmol/L Final     Anion Gap 03/10/2017 6  3 - 14 mmol/L Final     Glucose 03/10/2017 108* 70 - 99 mg/dL Final    Fasting specimen     Urea Nitrogen 03/10/2017 21  7 - 30 mg/dL Final     Creatinine 03/10/2017 1.28* 0.66 - 1.25 mg/dL Final     GFR Estimate 03/10/2017 56* >60 mL/min/1.7m2 Final    Non  GFR Calc     GFR Estimate If Black 03/10/2017 68  >60 mL/min/1.7m2 Final    African American GFR Calc     Calcium 03/10/2017 9.0  8.5 - 10.1 mg/dL Final     Bilirubin Total 03/10/2017 0.7  0.2 - 1.3 mg/dL Final     Albumin 03/10/2017 3.7  3.4 - 5.0 g/dL Final     Protein Total 03/10/2017 6.6* 6.8 - 8.8 g/dL Final     Alkaline Phosphatase 03/10/2017 79  40 - 150 U/L Final     ALT 03/10/2017 25  0 - 70 U/L Final     AST 03/10/2017 17  0 - 45 U/L Final     Cholesterol 03/10/2017 162  <200 mg/dL Final     Triglycerides 03/10/2017 69  <150 mg/dL Final    Fasting specimen     HDL Cholesterol 03/10/2017 57  >39 mg/dL Final     LDL Cholesterol Calculated 03/10/2017 91  <100 mg/dL Final    Desirable:       <100 mg/dl     Non HDL Cholesterol 03/10/2017 105  <130 mg/dL Final     PSA 03/10/2017 4.23* 0 - 4 ug/L  Final    Assay Method:  Chemiluminescence using Siemens Vista analyzer           ICD-10-CM    1. Special screening for malignant neoplasms, colon Z12.11 Fecal colorectal cancer screen FIT - Future (S+30)   2. Hypertension goal BP (blood pressure) < 140/90 I10 hydrochlorothiazide (HYDRODIURIL) 25 MG tablet     losartan (COZAAR) 25 MG tablet     Basic metabolic panel     Basic metabolic panel   3. Benign non-nodular prostatic hyperplasia without lower urinary tract symptoms N40.0 tamsulosin (FLOMAX) 0.4 MG capsule     finasteride (PROSCAR) 5 MG tablet   4. Hypercholesteremia E78.00 simvastatin (ZOCOR) 20 MG tablet    PLAN: Follow up in 1 year     SUBJECTIVE: patient has had decreased urinary flow and has been treated with Flomax with minimal results.    ICD-10-CM    1. Special screening for malignant neoplasms, colon Z12.11 Fecal colorectal cancer screen FIT - Future (S+30)   2. Hypertension goal BP (blood pressure) < 140/90 I10 hydrochlorothiazide (HYDRODIURIL) 25 MG tablet     losartan (COZAAR) 25 MG tablet     Basic metabolic panel     Basic metabolic panel   3. Benign non-nodular prostatic hyperplasia without lower urinary tract symptoms N40.0 tamsulosin (FLOMAX) 0.4 MG capsule     finasteride (PROSCAR) 5 MG tablet   4. Hypercholesteremia E78.00 simvastatin (ZOCOR) 20 MG tablet    PLAN:trial of fenasteride for 6 months     psa in 6 months

## 2017-11-07 NOTE — MR AVS SNAPSHOT
After Visit Summary   11/7/2017    Erick Yusuf    MRN: [id number]           Patient Information     Date Of Birth          1950        Visit Information        Provider Department      11/7/2017 9:30 AM Sancho Rushing MD [facility]        Today's Diagnoses     Special screening for malignant neoplasms, colon    -  1    Hypertension goal BP (blood pressure) < 140/90        Benign non-nodular prostatic hyperplasia without lower urinary tract symptoms        Hypercholesteremia           Follow-ups after your visit        Your next 10 appointments already scheduled     Nov 07, 2017  9:30 AM CST   Office Visit with Sancho Rushing MD   [facility] ([facility])    [id number] [facility] [address] [phone]           Bring a current list of meds and any records pertaining to this visit. For Physicals, please bring immunization records and any forms needing to be filled out. Please arrive 10 minutes early to complete paperwork.              Future tests that were ordered for you today     Open Future Orders        Priority Expected Expires Ordered    Fecal colorectal cancer screen FIT - Future (S+30) Routine 11/28/2017 12/7/2017 11/7/2017            Who to contact     If you have questions or need follow up information about today's clinic visit or your schedule please contact [facility] directly at [phone].  Normal or non-critical lab and imaging results will be communicated to you by MyChart, letter or phone within 4 business days after the clinic has received the results. If you do not hear from us within 7 days, please contact the clinic through MyChart or phone. If you have a critical or abnormal lab result, we will notify you by phone as soon as possible.  Submit refill requests through [employer] ( [employer] ) or call your pharmacy and they will forward the refill request to us. Please allow 3 business days for your  refill to be completed.          Additional Information About Your Visit        [employer] Information     [employer] gives you secure access to your electronic health record. If you see a primary care provider, you can also send messages to your care team and make appointments. If you have questions, please call your primary care clinic.  If you do not have a primary care provider, please call [phone] and they will assist you.        Care EveryWhere ID     This is your Care EveryWhere ID. This could be used by other organizations to access your [place] medical records  [id number]        Your Vitals Were     Pulse Temperature BMI (Body Mass Index)             63 97  F (36.1  C) (Oral) 28.56 kg/m2          Blood Pressure from Last 3 Encounters:   11/07/17 135/75   06/23/17 122/70   03/10/17 132/84    Weight from Last 3 Encounters:   11/07/17 204 lb 12.8 oz (92.9 kg)   06/23/17 202 lb (91.6 kg)   03/10/17 206 lb (93.4 kg)                 Today's Medication Changes          These changes are accurate as of: 11/7/17  8:07 AM.  If you have any questions, ask your nurse or doctor.               Start taking these medicines.        Dose/Directions    finasteride 5 MG tablet   Commonly known as:  PROSCAR   Used for:  Benign non-nodular prostatic hyperplasia without lower urinary tract symptoms   Started by:  Sancho Rushing MD        Dose:  5 mg   Take 1 tablet (5 mg) by mouth daily   Quantity:  90 tablet   Refills:  1       losartan 25 MG tablet   Commonly known as:  COZAAR   Used for:  Hypertension goal BP (blood pressure) < 140/90   Started by:  Sancho Rushing MD        Dose:  25 mg   Take 1 tablet (25 mg) by mouth daily   Quantity:  90 tablet   Refills:  1            Where to get your medicines      These medications were sent to [facility] - [place], [place] - [address]  [address], [place] [address]     Phone:  [phone]     finasteride 5 MG tablet     hydrochlorothiazide 25 MG tablet    losartan 25 MG tablet    simvastatin 20 MG tablet    tamsulosin 0.4 MG capsule                Primary Care Provider Office Phone # Fax #    Sancho Rushing [phone] [address] [facility] [address]        Equal Access to Services     [facility] DOC : Hadii aad ku hadasho Soomaali, waaxda luqadaha, qaybta kaalmada adeegyada, waxay idiin hayanupn annaliselucille buckley lamin fields. So [facility] [phone].    ATENCIÓN: Si habla español, tiene a swift disposición servicios gratuitos de asistencia lingüística. [facility] [phone].    We comply with applicable federal civil rights laws and [place] laws. We do not discriminate on the basis of race, color, national origin, age, disability, sex, sexual orientation, or gender identity.            Thank you!     Thank you for choosing [facility]  for your care. Our goal is always to provide you with excellent care. Hearing back from our patients is one way we can continue to improve our services. Please take a few minutes to complete the written survey that you may receive in the mail after your visit with us. Thank you!             Your Updated Medication List - Protect others around you: Learn how to safely use, store and throw away your medicines at www.disposemymeds.org.          This list is accurate as of: 11/7/17  8:07 AM.  Always use your most recent med list.                   Brand Name Dispense Instructions for use Diagnosis    finasteride 5 MG tablet    PROSCAR    90 tablet    Take 1 tablet (5 mg) by mouth daily    Benign non-nodular prostatic hyperplasia without lower urinary tract symptoms       hydrochlorothiazide 25 MG tablet    HYDRODIURIL    90 tablet    Take 1 tablet (25 mg) by mouth daily    Hypertension goal BP (blood pressure) < 140/90       lisinopril 5 MG tablet    PRINIVIL/ZESTRIL    30 tablet    Take 1 tablet (5 mg) by mouth daily    Hypertension goal BP (blood pressure) < 140/90        losartan 25 MG tablet    COZAAR    90 tablet    Take 1 tablet (25 mg) by mouth daily    Hypertension goal BP (blood pressure) < 140/90       Multi-vitamin Tabs tablet      Take 1 tablet by mouth daily.        simvastatin 20 MG tablet    ZOCOR    90 tablet    Take 1 tablet (20 mg) by mouth At Bedtime    Hypercholesteremia       tamsulosin 0.4 MG capsule    FLOMAX    90 capsule    Take 1 capsule (0.4 mg) by mouth daily    Benign non-nodular prostatic hyperplasia without lower urinary tract symptoms

## 2018-03-05 DIAGNOSIS — Z12.11 SCREEN FOR COLON CANCER: ICD-10-CM

## 2018-03-05 LAB — HEMOCCULT STL QL IA: NEGATIVE

## 2018-03-05 PROCEDURE — 82274 ASSAY TEST FOR BLOOD FECAL: CPT | Performed by: PHYSICIAN ASSISTANT

## 2018-03-06 NOTE — PROGRESS NOTES
Shana Suarez,       Your recent test results are attached, if you have any questions or concerns please feel free to contact me via e-mail or call [phone].  Occult blood test (FIT test) was normal.  No hidden blood was found in your stool.  This is a screening test for colon cancer and is recommended yearly.         It was a pleasure to see you at your recent office visit.      Sincerely,  Vida Singleton PA-C

## 2018-04-19 ENCOUNTER — TELEPHONE (OUTPATIENT)
Dept: FAMILY MEDICINE | Facility: CLINIC | Age: 68
End: 2018-04-19

## 2018-04-19 NOTE — TELEPHONE ENCOUNTER
Left message on voicemail for Erickson that I do not have an available appointment with Dr. Sancho Rushing today;  He is out of office next week.  Will need to schedule alternate appointment.  We do still have some openings today in clinic with other providers.  Laila BROOKS, [phone]

## 2018-04-19 NOTE — TELEPHONE ENCOUNTER
Erick is experiencing side effects from the losartan such as dry cough, runny nose and stomach ache.  Can he be worked in today with Dr Rushing?  All other scheduling options have been exhausted.  Patient requests appointment with provider.  No open appts on schedule at this time.

## 2018-05-02 ENCOUNTER — OFFICE VISIT (OUTPATIENT)
Dept: FAMILY MEDICINE | Facility: CLINIC | Age: 68
End: 2018-05-02
Payer: COMMERCIAL

## 2018-05-02 VITALS
HEIGHT: 69 IN | SYSTOLIC BLOOD PRESSURE: 137 MMHG | BODY MASS INDEX: 30.21 KG/M2 | TEMPERATURE: 97.7 F | HEART RATE: 64 BPM | WEIGHT: 204 LBS | DIASTOLIC BLOOD PRESSURE: 88 MMHG | OXYGEN SATURATION: 96 %

## 2018-05-02 DIAGNOSIS — I10 HYPERTENSION GOAL BP (BLOOD PRESSURE) < 140/90: ICD-10-CM

## 2018-05-02 DIAGNOSIS — Z23 NEED FOR PROPHYLACTIC VACCINATION AGAINST STREPTOCOCCUS PNEUMONIAE (PNEUMOCOCCUS): Primary | ICD-10-CM

## 2018-05-02 DIAGNOSIS — R10.32 LLQ ABDOMINAL PAIN: ICD-10-CM

## 2018-05-02 DIAGNOSIS — Z23 NEED FOR VACCINATION: ICD-10-CM

## 2018-05-02 LAB
ALBUMIN SERPL-MCNC: 4.1 G/DL (ref 3.4–5)
ALBUMIN UR-MCNC: NEGATIVE MG/DL
ALP SERPL-CCNC: 92 U/L (ref 40–150)
ALT SERPL W P-5'-P-CCNC: 27 U/L (ref 0–70)
ANION GAP SERPL CALCULATED.3IONS-SCNC: 9 MMOL/L (ref 3–14)
APPEARANCE UR: CLEAR
AST SERPL W P-5'-P-CCNC: 20 U/L (ref 0–45)
BILIRUB SERPL-MCNC: 1.3 MG/DL (ref 0.2–1.3)
BILIRUB UR QL STRIP: NEGATIVE
BUN SERPL-MCNC: 17 MG/DL (ref 7–30)
CALCIUM SERPL-MCNC: 9.1 MG/DL (ref 8.5–10.1)
CHLORIDE SERPL-SCNC: 108 MMOL/L (ref 94–109)
CHOLEST SERPL-MCNC: 166 MG/DL
CO2 SERPL-SCNC: 25 MMOL/L (ref 20–32)
COLOR UR AUTO: YELLOW
CREAT SERPL-MCNC: 1.33 MG/DL (ref 0.66–1.25)
GFR SERPL CREATININE-BSD FRML MDRD: 54 ML/MIN/1.7M2
GLUCOSE SERPL-MCNC: 100 MG/DL (ref 70–99)
GLUCOSE UR STRIP-MCNC: NEGATIVE MG/DL
HDLC SERPL-MCNC: 61 MG/DL
HGB UR QL STRIP: NEGATIVE
KETONES UR STRIP-MCNC: NEGATIVE MG/DL
LDLC SERPL CALC-MCNC: 90 MG/DL
LEUKOCYTE ESTERASE UR QL STRIP: NEGATIVE
NITRATE UR QL: NEGATIVE
NONHDLC SERPL-MCNC: 105 MG/DL
PH UR STRIP: 5.5 PH (ref 5–7)
POTASSIUM SERPL-SCNC: 4.1 MMOL/L (ref 3.4–5.3)
PROT SERPL-MCNC: 7 G/DL (ref 6.8–8.8)
RBC #/AREA URNS AUTO: NORMAL /HPF
SODIUM SERPL-SCNC: 142 MMOL/L (ref 133–144)
SOURCE: NORMAL
SP GR UR STRIP: 1.02 (ref 1–1.03)
TRIGL SERPL-MCNC: 75 MG/DL
UROBILINOGEN UR STRIP-ACNC: 0.2 EU/DL (ref 0.2–1)
WBC #/AREA URNS AUTO: NORMAL /HPF

## 2018-05-02 PROCEDURE — 81001 URINALYSIS AUTO W/SCOPE: CPT | Performed by: FAMILY MEDICINE

## 2018-05-02 PROCEDURE — 90471 IMMUNIZATION ADMIN: CPT | Performed by: FAMILY MEDICINE

## 2018-05-02 PROCEDURE — 80061 LIPID PANEL: CPT | Performed by: FAMILY MEDICINE

## 2018-05-02 PROCEDURE — 99214 OFFICE O/P EST MOD 30 MIN: CPT | Mod: 25 | Performed by: FAMILY MEDICINE

## 2018-05-02 PROCEDURE — 36415 COLL VENOUS BLD VENIPUNCTURE: CPT | Performed by: FAMILY MEDICINE

## 2018-05-02 PROCEDURE — 90732 PPSV23 VACC 2 YRS+ SUBQ/IM: CPT | Performed by: FAMILY MEDICINE

## 2018-05-02 PROCEDURE — 80053 COMPREHEN METABOLIC PANEL: CPT | Performed by: FAMILY MEDICINE

## 2018-05-02 NOTE — PROGRESS NOTES
"SUBJECTIVE:  67 year oldyear old male enters with a hx of hypertension.  Pt. Has been compliant with medications and medications were reviewed.  No side effects. No chest pain or sob. Low sodium diet.    Current Outpatient Prescriptions:      finasteride (PROSCAR) 5 MG tablet, Take 1 tablet (5 mg) by mouth daily, Disp: 90 tablet, Rfl: 1     hydrochlorothiazide (HYDRODIURIL) 25 MG tablet, Take 1 tablet (25 mg) by mouth daily, Disp: 90 tablet, Rfl: 1     losartan (COZAAR) 25 MG tablet, Take 1 tablet (25 mg) by mouth daily, Disp: 90 tablet, Rfl: 1     multivitamin, therapeutic with minerals (MULTI-VITAMIN) TABS, Take 1 tablet by mouth daily., Disp: , Rfl:      simvastatin (ZOCOR) 20 MG tablet, Take 1 tablet (20 mg) by mouth At Bedtime, Disp: 90 tablet, Rfl: 1     tamsulosin (FLOMAX) 0.4 MG capsule, Take 1 capsule (0.4 mg) by mouth daily, Disp: 90 capsule, Rfl: 1  Past Medical History:   Diagnosis Date     HTN (hypertension)      Hypercholesteremia      Leucopenia      Orders Only on 03/05/2018   Component Date Value Ref Range Status     Occult Blood Scn FIT 03/05/2018 Negative  NEG^Negative Final      Reviewed health maintenance  Patient Active Problem List   Diagnosis     HYPERLIPIDEMIA LDL GOAL <130     Hypertension goal BP (blood pressure) < 140/90     Advanced directives, counseling/discussion     Benign non-nodular prostatic hyperplasia without lower urinary tract symptoms         OBJECTIVE:  no apparent distress  /88  Pulse 64  Temp 97.7  F (36.5  C) (Oral)  Ht 5' 9\" (1.753 m)  Wt 204 lb (92.5 kg)  SpO2 96%  BMI 30.13 kg/m2     Head: Normocephalic. No masses, lesions, tenderness or abnormalities.  Neck::Neck supple. No adenopathy. Thyroid symmetric, normal size.    Cardiovascular: negative. No lifts, heaves, or thrills. RRR. No murmurs, clicks gallops or rubs  Respiratory. Good diaphragmatic excursion. Lungs clear  Gastrointestinal:Abdomen soft, non-tender.  No masses, organomegaly    Orders Only on " "03/05/2018   Component Date Value Ref Range Status     Occult Blood Scn FIT 03/05/2018 Negative  NEG^Negative Final         ICD-10-CM    1. Need for prophylactic vaccination against Streptococcus pneumoniae (pneumococcus) Z23    2. Need for vaccination Z23    3. Hypertension goal BP (blood pressure) < 140/90 I10     PLAN: Follow up in 6 months         SUBJECTIVE:  67 year old.The patient has a history of episodes of pain.  This started one year ago. Location left lower quadran quality dull pain Associated symptoms are none last from 5-10 minutes.  Brought on by unknown .  Better with time. ROS no change in bowel movement, no melena or blood stools      Reviewed health maintenance  Patient Active Problem List   Diagnosis     HYPERLIPIDEMIA LDL GOAL <130     Hypertension goal BP (blood pressure) < 140/90     Advanced directives, counseling/discussion     Benign non-nodular prostatic hyperplasia without lower urinary tract symptoms     Past Medical History:   Diagnosis Date     HTN (hypertension)      Hypercholesteremia      Leucopenia        OBJECTIVE:  no apparent distress  /88  Pulse 64  Temp 97.7  F (36.5  C) (Oral)  Ht 5' 9\" (1.753 m)  Wt 204 lb (92.5 kg)  SpO2 96%  BMI 30.13 kg/m2    LUNGS:  CTA B/L, no wheezing or crackles.   Cardiovascular: negative   Gastrointestinal: Abdomen soft, non-tender. BS normal. No masses, organomegaly       ICD-10-CM    1. Need for prophylactic vaccination against Streptococcus pneumoniae (pneumococcus) Z23    2. Need for vaccination Z23    3. Hypertension goal BP (blood pressure) < 140/90 I10     PLAN: Follow up in 6 months             "

## 2018-05-02 NOTE — MR AVS SNAPSHOT
After Visit Summary   5/2/2018    Erick Yusuf    MRN: [id number]           Patient Information     Date Of Birth          1950        Visit Information        Provider Department      5/2/2018 10:00 AM Sancho Rushing MD [facility]        Today's Diagnoses     Need for prophylactic vaccination against Streptococcus pneumoniae (pneumococcus)    -  1    Need for vaccination        Hypertension goal BP (blood pressure) < 140/90        LLQ abdominal pain           Follow-ups after your visit        Who to contact     If you have questions or need follow up information about today's clinic visit or your schedule please contact [facility] directly at [phone].  Normal or non-critical lab and imaging results will be communicated to you by MyChart, letter or phone within 4 business days after the clinic has received the results. If you do not hear from us within 7 days, please contact the clinic through [employer] or phone. If you have a critical or abnormal lab result, we will notify you by phone as soon as possible.  Submit refill requests through [employer] or call your pharmacy and they will forward the refill request to us. Please allow 3 business days for your refill to be completed.          Additional Information About Your Visit        MyChart Information     [employer] gives you secure access to your electronic health record. If you see a primary care provider, you can also send messages to your care team and make appointments. If you have questions, please call your primary care clinic.  If you do not have a primary care provider, please call [phone] and they will assist you.        Care EveryWhere ID     This is your Care EveryWhere ID. This could be used by other organizations to access your [place] medical records  [id number]        Your Vitals Were     Pulse Temperature Height Pulse Oximetry BMI (Body Mass Index)       64 97.7  F (36.5  C)  "(Oral) 5' 9\" (1.753 m) 96% 30.13 kg/m2        Blood Pressure from Last 3 Encounters:   05/02/18 137/88   11/07/17 135/75   06/23/17 122/70    Weight from Last 3 Encounters:   05/02/18 204 lb (92.5 kg)   11/07/17 204 lb 12.8 oz (92.9 kg)   06/23/17 202 lb (91.6 kg)              We Performed the Following     1st  Administration  [94604]     COMPREHENSIVE METABOLIC PANEL     Lipid panel reflex to direct LDL Fasting     Pneumococcal vaccine 23 valent PPSV23  (Pneumovax) [59812]     UA with Microscopic        Primary Care Provider Office Phone # Fax #    Sancho Rushing [phone] [address] [facility] [address]        Equal Access to Services     [facility] DOC : Hadii aad ku hadasho Somichele, waaxda luqadaha, qaybta kaalmada mayo, jayashree kimble . So [facility] [phone].    ATENCIÓN: Si habla español, tiene a swift disposición servicios gratuitos de asistencia lingüística. [facility] [phone].    We comply with applicable federal civil rights laws and [place] laws. We do not discriminate on the basis of race, color, national origin, age, disability, sex, sexual orientation, or gender identity.            Thank you!     Thank you for choosing [facility]  for your care. Our goal is always to provide you with excellent care. Hearing back from our patients is one way we can continue to improve our services. Please take a few minutes to complete the written survey that you may receive in the mail after your visit with us. Thank you!             Your Updated Medication List - Protect others around you: Learn how to safely use, store and throw away your medicines at www.disposemymeds.org.          This list is accurate as of 5/2/18  5:00 PM.  Always use your most recent med list.                   Brand Name Dispense Instructions for use Diagnosis    finasteride 5 MG tablet    PROSCAR    90 tablet    Take 1 tablet (5 mg) by mouth daily    Benign " non-nodular prostatic hyperplasia without lower urinary tract symptoms       hydrochlorothiazide 25 MG tablet    HYDRODIURIL    90 tablet    Take 1 tablet (25 mg) by mouth daily    Hypertension goal BP (blood pressure) < 140/90       losartan 25 MG tablet    COZAAR    90 tablet    Take 1 tablet (25 mg) by mouth daily    Hypertension goal BP (blood pressure) < 140/90       Multi-vitamin Tabs tablet      Take 1 tablet by mouth daily.        simvastatin 20 MG tablet    ZOCOR    90 tablet    Take 1 tablet (20 mg) by mouth At Bedtime    Hypercholesteremia       tamsulosin 0.4 MG capsule    FLOMAX    90 capsule    Take 1 capsule (0.4 mg) by mouth daily    Benign non-nodular prostatic hyperplasia without lower urinary tract symptoms

## 2018-06-28 DIAGNOSIS — N40.0 BENIGN NON-NODULAR PROSTATIC HYPERPLASIA WITHOUT LOWER URINARY TRACT SYMPTOMS: ICD-10-CM

## 2018-06-28 DIAGNOSIS — E78.00 HYPERCHOLESTEREMIA: ICD-10-CM

## 2018-06-28 DIAGNOSIS — I10 HYPERTENSION GOAL BP (BLOOD PRESSURE) < 140/90: ICD-10-CM

## 2018-06-29 RX ORDER — HYDROCHLOROTHIAZIDE 25 MG/1
25 TABLET ORAL DAILY
Qty: 90 TABLET | Refills: 1 | Status: SHIPPED | OUTPATIENT
Start: 2018-06-29 | End: 2018-10-30

## 2018-06-29 RX ORDER — SIMVASTATIN 20 MG
20 TABLET ORAL AT BEDTIME
Qty: 90 TABLET | Refills: 1 | Status: SHIPPED | OUTPATIENT
Start: 2018-06-29 | End: 2018-10-30

## 2018-06-29 RX ORDER — LOSARTAN POTASSIUM 25 MG/1
25 TABLET ORAL DAILY
Qty: 90 TABLET | Refills: 1 | Status: SHIPPED | OUTPATIENT
Start: 2018-06-29 | End: 2018-10-30

## 2018-06-29 RX ORDER — FINASTERIDE 5 MG/1
5 TABLET, FILM COATED ORAL DAILY
Qty: 90 TABLET | Refills: 1 | Status: SHIPPED | OUTPATIENT
Start: 2018-06-29 | End: 2018-10-30

## 2018-06-29 RX ORDER — TAMSULOSIN HYDROCHLORIDE 0.4 MG/1
0.4 CAPSULE ORAL DAILY
Qty: 90 CAPSULE | Refills: 1 | Status: SHIPPED | OUTPATIENT
Start: 2018-06-29 | End: 2018-10-30

## 2018-10-30 ENCOUNTER — DOCUMENTATION ONLY (OUTPATIENT)
Dept: LAB | Facility: CLINIC | Age: 68
End: 2018-10-30

## 2018-10-30 ENCOUNTER — OFFICE VISIT (OUTPATIENT)
Dept: FAMILY MEDICINE | Facility: CLINIC | Age: 68
End: 2018-10-30
Payer: COMMERCIAL

## 2018-10-30 VITALS
BODY MASS INDEX: 30.42 KG/M2 | RESPIRATION RATE: 18 BRPM | TEMPERATURE: 97.4 F | OXYGEN SATURATION: 98 % | HEART RATE: 61 BPM | DIASTOLIC BLOOD PRESSURE: 89 MMHG | WEIGHT: 206 LBS | SYSTOLIC BLOOD PRESSURE: 141 MMHG

## 2018-10-30 DIAGNOSIS — E78.00 HYPERCHOLESTEREMIA: ICD-10-CM

## 2018-10-30 DIAGNOSIS — E78.5 HYPERLIPIDEMIA LDL GOAL <130: Primary | ICD-10-CM

## 2018-10-30 DIAGNOSIS — I10 HYPERTENSION GOAL BP (BLOOD PRESSURE) < 140/90: ICD-10-CM

## 2018-10-30 DIAGNOSIS — N40.0 BENIGN NON-NODULAR PROSTATIC HYPERPLASIA WITHOUT LOWER URINARY TRACT SYMPTOMS: ICD-10-CM

## 2018-10-30 LAB
ANION GAP SERPL CALCULATED.3IONS-SCNC: 9 MMOL/L (ref 3–14)
BUN SERPL-MCNC: 21 MG/DL (ref 7–30)
CALCIUM SERPL-MCNC: 9.5 MG/DL (ref 8.5–10.1)
CHLORIDE SERPL-SCNC: 106 MMOL/L (ref 94–109)
CO2 SERPL-SCNC: 29 MMOL/L (ref 20–32)
CREAT SERPL-MCNC: 1.19 MG/DL (ref 0.66–1.25)
GFR SERPL CREATININE-BSD FRML MDRD: 61 ML/MIN/1.7M2
GLUCOSE SERPL-MCNC: 87 MG/DL (ref 70–99)
POTASSIUM SERPL-SCNC: 3.6 MMOL/L (ref 3.4–5.3)
PSA SERPL-ACNC: 3.91 UG/L (ref 0–4)
SODIUM SERPL-SCNC: 144 MMOL/L (ref 133–144)

## 2018-10-30 PROCEDURE — 36415 COLL VENOUS BLD VENIPUNCTURE: CPT | Performed by: FAMILY MEDICINE

## 2018-10-30 PROCEDURE — 80048 BASIC METABOLIC PNL TOTAL CA: CPT | Performed by: FAMILY MEDICINE

## 2018-10-30 PROCEDURE — G0103 PSA SCREENING: HCPCS | Performed by: FAMILY MEDICINE

## 2018-10-30 PROCEDURE — 99214 OFFICE O/P EST MOD 30 MIN: CPT | Performed by: FAMILY MEDICINE

## 2018-10-30 RX ORDER — TAMSULOSIN HYDROCHLORIDE 0.4 MG/1
0.4 CAPSULE ORAL DAILY
Qty: 90 CAPSULE | Refills: 1 | Status: SHIPPED | OUTPATIENT
Start: 2018-10-30 | End: 2019-07-16

## 2018-10-30 RX ORDER — SIMVASTATIN 20 MG
20 TABLET ORAL AT BEDTIME
Qty: 90 TABLET | Refills: 1 | Status: SHIPPED | OUTPATIENT
Start: 2018-10-30 | End: 2019-07-16

## 2018-10-30 RX ORDER — LOSARTAN POTASSIUM 25 MG/1
25 TABLET ORAL DAILY
Qty: 90 TABLET | Refills: 1 | Status: SHIPPED | OUTPATIENT
Start: 2018-10-30 | End: 2019-07-16

## 2018-10-30 RX ORDER — HYDROCHLOROTHIAZIDE 25 MG/1
25 TABLET ORAL DAILY
Qty: 90 TABLET | Refills: 1 | Status: SHIPPED | OUTPATIENT
Start: 2018-10-30 | End: 2019-07-16

## 2018-10-30 ASSESSMENT — PAIN SCALES - GENERAL: PAINLEVEL: NO PAIN (0)

## 2018-10-30 NOTE — PROGRESS NOTES
SUBJECTIVE:  67 year old.The patient has a complaint of elevation psa.  This started 5 years ago.  Associated symptoms are stream is less strong.   .  . ROS no hematuria, no ed      Reviewed health maintenance  Patient Active Problem List   Diagnosis     HYPERLIPIDEMIA LDL GOAL <130     Hypertension goal BP (blood pressure) < 140/90     Advanced directives, counseling/discussion     Benign non-nodular prostatic hyperplasia without lower urinary tract symptoms     Past Medical History:   Diagnosis Date     HTN (hypertension)      Hypercholesteremia      Leucopenia        OBJECTIVE:  no apparent distress  /89  Pulse 61  Temp 97.4  F (36.3  C) (Oral)  Resp 18  Wt 206 lb (93.4 kg)  SpO2 98%  BMI 30.42 kg/m2    LUNGS:  CTA B/L, no wheezing or crackles.   Cardiovascular: negative, PMI normal. No lifts, heaves, or thrills. RRR. No murmurs, clicks gallops or rub   Gastrointestinal: Abdomen soft, non-tender. BS normal. No masses, organomegaly       ICD-10-CM    1. Hypertension goal BP (blood pressure) < 140/90 I10 hydrochlorothiazide (HYDRODIURIL) 25 MG tablet     losartan (COZAAR) 25 MG tablet     simvastatin (ZOCOR) 20 MG tablet     tamsulosin (FLOMAX) 0.4 MG capsule   2. Hypercholesteremia E78.00 hydrochlorothiazide (HYDRODIURIL) 25 MG tablet     losartan (COZAAR) 25 MG tablet     simvastatin (ZOCOR) 20 MG tablet     tamsulosin (FLOMAX) 0.4 MG capsule   3. Benign non-nodular prostatic hyperplasia without lower urinary tract symptoms N40.0 hydrochlorothiazide (HYDRODIURIL) 25 MG tablet     losartan (COZAAR) 25 MG tablet     simvastatin (ZOCOR) 20 MG tablet     tamsulosin (FLOMAX) 0.4 MG capsule    PLAN: await PSA      SUBJECTIVE:  67 year oldyear old male enters with  hypertension.  Pt. Has been compliant with medications and medications were reviewed.  No side effects. No chest pain or sob. Low sodium diet.    Current Outpatient Prescriptions:      hydrochlorothiazide (HYDRODIURIL) 25 MG tablet, Take 1 tablet  (25 mg) by mouth daily, Disp: 90 tablet, Rfl: 1     losartan (COZAAR) 25 MG tablet, Take 1 tablet (25 mg) by mouth daily, Disp: 90 tablet, Rfl: 1     multivitamin, therapeutic with minerals (MULTI-VITAMIN) TABS, Take 1 tablet by mouth daily., Disp: , Rfl:      simvastatin (ZOCOR) 20 MG tablet, Take 1 tablet (20 mg) by mouth At Bedtime, Disp: 90 tablet, Rfl: 1     tamsulosin (FLOMAX) 0.4 MG capsule, Take 1 capsule (0.4 mg) by mouth daily, Disp: 90 capsule, Rfl: 1     [DISCONTINUED] hydrochlorothiazide (HYDRODIURIL) 25 MG tablet, Take 1 tablet (25 mg) by mouth daily, Disp: 90 tablet, Rfl: 1     [DISCONTINUED] losartan (COZAAR) 25 MG tablet, Take 1 tablet (25 mg) by mouth daily, Disp: 90 tablet, Rfl: 1     [DISCONTINUED] simvastatin (ZOCOR) 20 MG tablet, Take 1 tablet (20 mg) by mouth At Bedtime, Disp: 90 tablet, Rfl: 1  Past Medical History:   Diagnosis Date     HTN (hypertension)      Hypercholesteremia      Leucopenia      Office Visit on 05/02/2018   Component Date Value Ref Range Status     Sodium 05/02/2018 142  133 - 144 mmol/L Final     Potassium 05/02/2018 4.1  3.4 - 5.3 mmol/L Final     Chloride 05/02/2018 108  94 - 109 mmol/L Final     Carbon Dioxide 05/02/2018 25  20 - 32 mmol/L Final     Anion Gap 05/02/2018 9  3 - 14 mmol/L Final     Glucose 05/02/2018 100* 70 - 99 mg/dL Final    Fasting specimen     Urea Nitrogen 05/02/2018 17  7 - 30 mg/dL Final     Creatinine 05/02/2018 1.33* 0.66 - 1.25 mg/dL Final     GFR Estimate 05/02/2018 54* >60 mL/min/1.7m2 Final    Non  GFR Calc     GFR Estimate If Black 05/02/2018 65  >60 mL/min/1.7m2 Final    African American GFR Calc     Calcium 05/02/2018 9.1  8.5 - 10.1 mg/dL Final     Bilirubin Total 05/02/2018 1.3  0.2 - 1.3 mg/dL Final     Albumin 05/02/2018 4.1  3.4 - 5.0 g/dL Final     Protein Total 05/02/2018 7.0  6.8 - 8.8 g/dL Final     Alkaline Phosphatase 05/02/2018 92  40 - 150 U/L Final     ALT 05/02/2018 27  0 - 70 U/L Final     AST 05/02/2018  20  0 - 45 U/L Final     Cholesterol 05/02/2018 166  <200 mg/dL Final     Triglycerides 05/02/2018 75  <150 mg/dL Final    Fasting specimen     HDL Cholesterol 05/02/2018 61  >39 mg/dL Final     LDL Cholesterol Calculated 05/02/2018 90  <100 mg/dL Final    Desirable:       <100 mg/dl     Non HDL Cholesterol 05/02/2018 105  <130 mg/dL Final     Color Urine 05/02/2018 Yellow   Final     Appearance Urine 05/02/2018 Clear   Final     Glucose Urine 05/02/2018 Negative  NEG^Negative mg/dL Final     Bilirubin Urine 05/02/2018 Negative  NEG^Negative Final     Ketones Urine 05/02/2018 Negative  NEG^Negative mg/dL Final     Specific Gravity Urine 05/02/2018 1.020  1.003 - 1.035 Final     pH Urine 05/02/2018 5.5  5.0 - 7.0 pH Final     Protein Albumin Urine 05/02/2018 Negative  NEG^Negative mg/dL Final     Urobilinogen Urine 05/02/2018 0.2  0.2 - 1.0 EU/dL Final     Nitrite Urine 05/02/2018 Negative  NEG^Negative Final     Blood Urine 05/02/2018 Negative  NEG^Negative Final     Leukocyte Esterase Urine 05/02/2018 Negative  NEG^Negative Final     Source 05/02/2018 Midstream Urine   Final     WBC Urine 05/02/2018 0 - 5  OTO5^0 - 5 /HPF Final     RBC Urine 05/02/2018 O - 2  OTO2^O - 2 /HPF Final      Reviewed health maintenance  Patient Active Problem List   Diagnosis     HYPERLIPIDEMIA LDL GOAL <130     Hypertension goal BP (blood pressure) < 140/90     Advanced directives, counseling/discussion     Benign non-nodular prostatic hyperplasia without lower urinary tract symptoms         OBJECTIVE:  no apparent distress  /89  Pulse 61  Temp 97.4  F (36.3  C) (Oral)  Resp 18  Wt 206 lb (93.4 kg)  SpO2 98%  BMI 30.42 kg/m2     Head: Normocephalic. No masses, lesions, tenderness or abnormalities.  Neck::Neck supple. No adenopathy. Thyroid symmetric, normal size.    Cardiovascular: negative. No lifts, heaves, or thrills. RRR. No murmurs, clicks gallops or rubs  Respiratory. Good diaphragmatic excursion. Lungs  clear  Gastrointestinal:Abdomen soft, non-tender.  No masses, organomegaly    Office Visit on 05/02/2018   Component Date Value Ref Range Status     Sodium 05/02/2018 142  133 - 144 mmol/L Final     Potassium 05/02/2018 4.1  3.4 - 5.3 mmol/L Final     Chloride 05/02/2018 108  94 - 109 mmol/L Final     Carbon Dioxide 05/02/2018 25  20 - 32 mmol/L Final     Anion Gap 05/02/2018 9  3 - 14 mmol/L Final     Glucose 05/02/2018 100* 70 - 99 mg/dL Final    Fasting specimen     Urea Nitrogen 05/02/2018 17  7 - 30 mg/dL Final     Creatinine 05/02/2018 1.33* 0.66 - 1.25 mg/dL Final     GFR Estimate 05/02/2018 54* >60 mL/min/1.7m2 Final    Non  GFR Calc     GFR Estimate If Black 05/02/2018 65  >60 mL/min/1.7m2 Final    African American GFR Calc     Calcium 05/02/2018 9.1  8.5 - 10.1 mg/dL Final     Bilirubin Total 05/02/2018 1.3  0.2 - 1.3 mg/dL Final     Albumin 05/02/2018 4.1  3.4 - 5.0 g/dL Final     Protein Total 05/02/2018 7.0  6.8 - 8.8 g/dL Final     Alkaline Phosphatase 05/02/2018 92  40 - 150 U/L Final     ALT 05/02/2018 27  0 - 70 U/L Final     AST 05/02/2018 20  0 - 45 U/L Final     Cholesterol 05/02/2018 166  <200 mg/dL Final     Triglycerides 05/02/2018 75  <150 mg/dL Final    Fasting specimen     HDL Cholesterol 05/02/2018 61  >39 mg/dL Final     LDL Cholesterol Calculated 05/02/2018 90  <100 mg/dL Final    Desirable:       <100 mg/dl     Non HDL Cholesterol 05/02/2018 105  <130 mg/dL Final     Color Urine 05/02/2018 Yellow   Final     Appearance Urine 05/02/2018 Clear   Final     Glucose Urine 05/02/2018 Negative  NEG^Negative mg/dL Final     Bilirubin Urine 05/02/2018 Negative  NEG^Negative Final     Ketones Urine 05/02/2018 Negative  NEG^Negative mg/dL Final     Specific Gravity Urine 05/02/2018 1.020  1.003 - 1.035 Final     pH Urine 05/02/2018 5.5  5.0 - 7.0 pH Final     Protein Albumin Urine 05/02/2018 Negative  NEG^Negative mg/dL Final     Urobilinogen Urine 05/02/2018 0.2  0.2 - 1.0 EU/dL  Final     Nitrite Urine 05/02/2018 Negative  NEG^Negative Final     Blood Urine 05/02/2018 Negative  NEG^Negative Final     Leukocyte Esterase Urine 05/02/2018 Negative  NEG^Negative Final     Source 05/02/2018 Midstream Urine   Final     WBC Urine 05/02/2018 0 - 5  OTO5^0 - 5 /HPF Final     RBC Urine 05/02/2018 O - 2  OTO2^O - 2 /HPF Final         ICD-10-CM    1. Hypertension goal BP (blood pressure) < 140/90 I10 hydrochlorothiazide (HYDRODIURIL) 25 MG tablet     losartan (COZAAR) 25 MG tablet     simvastatin (ZOCOR) 20 MG tablet     tamsulosin (FLOMAX) 0.4 MG capsule     Basic metabolic panel  (Ca, Cl, CO2, Creat, Gluc, K, Na, BUN)   2. Hypercholesteremia E78.00 hydrochlorothiazide (HYDRODIURIL) 25 MG tablet     losartan (COZAAR) 25 MG tablet     simvastatin (ZOCOR) 20 MG tablet     tamsulosin (FLOMAX) 0.4 MG capsule   3. Benign non-nodular prostatic hyperplasia without lower urinary tract symptoms N40.0 hydrochlorothiazide (HYDRODIURIL) 25 MG tablet     losartan (COZAAR) 25 MG tablet     simvastatin (ZOCOR) 20 MG tablet     tamsulosin (FLOMAX) 0.4 MG capsule    PLAN:      Follow up in 6 months

## 2018-10-30 NOTE — PROGRESS NOTES
Please review lab orders sign and close encounter. Karin Zelaya TC    Bp ck and med refill 10/30/18. Pt declined Pvl and will come in fasting for apt.    Just in case he made a fasting lab for 11/2/18

## 2018-10-30 NOTE — PROGRESS NOTES
.Per lab scrubbing protocol patientt is not due for any lab test at this time. Please review chart, if nothing is needed please contact the patient and cancel the appointment.  Thank you,  Zenaida

## 2018-10-30 NOTE — MR AVS SNAPSHOT
After Visit Summary   10/30/2018    Erick Yusuf    MRN: [id number]           Patient Information     Date Of Birth          1950        Visit Information        Provider Department      10/30/2018 3:00 PM Sancho Rushing MD [facility]        Today's Diagnoses     Hypertension goal BP (blood pressure) < 140/90        Hypercholesteremia        Benign non-nodular prostatic hyperplasia without lower urinary tract symptoms           Follow-ups after your visit        Follow-up notes from your care team     Return for fasting blood draw, BP Recheck.      Your next 10 appointments already scheduled     Nov 02, 2018  9:15 AM CDT   LAB with AN LAB   [facility]    [address] [facility] [address] [phone]           Please do not eat 10-12 hours before your appointment if you are coming in fasting for labs on lipids, cholesterol, or glucose (sugar). This does not apply to pregnant women. Water, hot tea and black coffee (with nothing added) are okay. Do not drink other fluids, diet soda or chew gum.              Who to contact     If you have questions or need follow up information about today's clinic visit or your schedule please contact [facility] directly at [phone].  Normal or non-critical lab and imaging results will be communicated to you by MyChart, letter or phone within 4 business days after the clinic has received the results. If you do not hear from us within 7 days, please contact the clinic through [employer] or phone. If you have a critical or abnormal lab result, we will notify you by phone as soon as possible.  Submit refill requests through [employer] or call your pharmacy and they will forward the refill request to us. Please allow 3 business days for your refill to be completed.          Additional Information About Your Visit        [employer] Information     [employer] gives you secure access  to your electronic health record. If you see a primary care provider, you can also send messages to your care team and make appointments. If you have questions, please call your primary care clinic.  If you do not have a primary care provider, please call [phone] and they will assist you.        Care EveryWhere ID     This is your Care EveryWhere ID. This could be used by other organizations to access your [place] medical records  [id number]        Your Vitals Were     Pulse Temperature Respirations Pulse Oximetry BMI (Body Mass Index)       61 97.4  F (36.3  C) (Oral) 18 98% 30.42 kg/m2        Blood Pressure from Last 3 Encounters:   10/30/18 141/89   05/02/18 137/88   11/07/17 135/75    Weight from Last 3 Encounters:   10/30/18 206 lb (93.4 kg)   05/02/18 204 lb (92.5 kg)   11/07/17 204 lb 12.8 oz (92.9 kg)              We Performed the Following     Basic metabolic panel  (Ca, Cl, CO2, Creat, Gluc, K, Na, BUN)     PSA, screen          Where to get your medicines      These medications were sent to [facility] - [place], [place] - [address]  [address], [place] [address]     Phone:  [phone]     hydrochlorothiazide 25 MG tablet    losartan 25 MG tablet    simvastatin 20 MG tablet    tamsulosin 0.4 MG capsule          Primary Care Provider Office Phone # Fax #    Sancho Rushing [phone] [address] [facility] [address]        Equal Access to Services     JEISON ROACH : Hadii dony ku hadasho Soomaali, waaxda luqadaha, qaybta kaalmada mayo, jayashree fields. So [facility] [phone].    ATENCIÓN: Si habla español, tiene a swift disposición servicios gratuitos de asistencia lingüística. Llame al [phone].    We comply with applicable federal civil rights laws and [place] laws. We do not discriminate on the basis of race, color, national origin, age, disability, sex, sexual orientation, or gender identity.             Thank you!     Thank you for choosing [facility] [place]  for your care. Our goal is always to provide you with excellent care. Hearing back from our patients is one way we can continue to improve our services. Please take a few minutes to complete the written survey that you may receive in the mail after your visit with us. Thank you!             Your Updated Medication List - Protect others around you: Learn how to safely use, store and throw away your medicines at www.disposemymeds.org.          This list is accurate as of 10/30/18  3:35 PM.  Always use your most recent med list.                   Brand Name Dispense Instructions for use Diagnosis    hydrochlorothiazide 25 MG tablet    HYDRODIURIL    90 tablet    Take 1 tablet (25 mg) by mouth daily    Hypertension goal BP (blood pressure) < 140/90, Hypercholesteremia, Benign non-nodular prostatic hyperplasia without lower urinary tract symptoms       losartan 25 MG tablet    COZAAR    90 tablet    Take 1 tablet (25 mg) by mouth daily    Hypertension goal BP (blood pressure) < 140/90, Hypercholesteremia, Benign non-nodular prostatic hyperplasia without lower urinary tract symptoms       Multi-vitamin Tabs tablet      Take 1 tablet by mouth daily.        simvastatin 20 MG tablet    ZOCOR    90 tablet    Take 1 tablet (20 mg) by mouth At Bedtime    Hypercholesteremia, Hypertension goal BP (blood pressure) < 140/90, Benign non-nodular prostatic hyperplasia without lower urinary tract symptoms       tamsulosin 0.4 MG capsule    FLOMAX    90 capsule    Take 1 capsule (0.4 mg) by mouth daily    Benign non-nodular prostatic hyperplasia without lower urinary tract symptoms, Hypertension goal BP (blood pressure) < 140/90, Hypercholesteremia

## 2018-10-30 NOTE — NURSING NOTE
"Chief Complaint   Patient presents with     Recheck Medication       Initial /89  Pulse 61  Temp 97.4  F (36.3  C) (Oral)  Resp 18  Wt 206 lb (93.4 kg)  SpO2 98%  BMI 30.42 kg/m2 Estimated body mass index is 30.42 kg/(m^2) as calculated from the following:    Height as of 5/2/18: 5' 9\" (1.753 m).    Weight as of this encounter: 206 lb (93.4 kg).  Medication Reconciliation: complete  Sara Byrd M.A.    "

## 2018-12-10 ENCOUNTER — TELEPHONE (OUTPATIENT)
Dept: FAMILY MEDICINE | Facility: CLINIC | Age: 68
End: 2018-12-10

## 2018-12-10 ENCOUNTER — OFFICE VISIT (OUTPATIENT)
Dept: FAMILY MEDICINE | Facility: CLINIC | Age: 68
End: 2018-12-10
Payer: COMMERCIAL

## 2018-12-10 VITALS
RESPIRATION RATE: 18 BRPM | BODY MASS INDEX: 30.42 KG/M2 | WEIGHT: 206 LBS | DIASTOLIC BLOOD PRESSURE: 70 MMHG | OXYGEN SATURATION: 99 % | SYSTOLIC BLOOD PRESSURE: 130 MMHG | HEART RATE: 66 BPM | TEMPERATURE: 98 F

## 2018-12-10 DIAGNOSIS — H65.92 OME (OTITIS MEDIA WITH EFFUSION), LEFT: Primary | ICD-10-CM

## 2018-12-10 PROCEDURE — 99213 OFFICE O/P EST LOW 20 MIN: CPT | Performed by: FAMILY MEDICINE

## 2018-12-10 RX ORDER — AMOXICILLIN 875 MG
875 TABLET ORAL 2 TIMES DAILY
Qty: 20 TABLET | Refills: 0 | Status: SHIPPED | OUTPATIENT
Start: 2018-12-10 | End: 2019-01-11

## 2018-12-10 ASSESSMENT — PAIN SCALES - GENERAL: PAINLEVEL: NO PAIN (0)

## 2018-12-10 ASSESSMENT — ENCOUNTER SYMPTOMS
COUGH: 0
FEVER: 1
SORE THROAT: 1

## 2018-12-10 NOTE — TELEPHONE ENCOUNTER
Wife states patient is having sinus congestion; ear discomfort.  Is aware if having ear pain would need to be seen for assessment.  If feels is sinus related that EVISIT is appropriate.  Appointment made for Wednesday.  Laila Johnson RN

## 2018-12-10 NOTE — TELEPHONE ENCOUNTER
Erick has a sinus infection.  Would like medication called in today.  Scheduling offered open appt with another provider today, but appt was declined.

## 2018-12-10 NOTE — PROGRESS NOTES
URI   This is a new problem. The current episode started in the past 7 days. Associated symptoms include congestion, a fever and a sore throat. Pertinent negatives include no coughing.         Review of Systems   Constitutional: Positive for fever.   HENT: Positive for congestion, hearing loss and sore throat.    Respiratory: Negative for cough.      OBJECTIVE:  no apparent distress  /70   Pulse 66   Temp 98  F (36.7  C) (Oral)   Resp 18   Wt 93.4 kg (206 lb)   SpO2 99%   BMI 30.42 kg/m     OBJECTIVE:  no apparent distress  /70   Pulse 66   Temp 98  F (36.7  C) (Oral)   Resp 18   Wt 93.4 kg (206 lb)   SpO2 99%   BMI 30.42 kg/m       Physical Exam   HENT:   Right Ear: External ear normal.   Left Ear: External ear normal. Tympanic membrane is injected.   Neck: Normal range of motion.   Cardiovascular: Normal rate and regular rhythm.   Pulmonary/Chest: Effort normal and breath sounds normal.         ICD-10-CM    1. OME (otitis media with effusion), left H65.92 amoxicillin (AMOXIL) 875 MG tablet    PLAN:Retun to clinic if sx not improved in one week

## 2019-01-11 ENCOUNTER — TELEPHONE (OUTPATIENT)
Dept: FAMILY MEDICINE | Facility: CLINIC | Age: 69
End: 2019-01-11

## 2019-01-11 DIAGNOSIS — H65.92 OME (OTITIS MEDIA WITH EFFUSION), LEFT: ICD-10-CM

## 2019-01-11 RX ORDER — AMOXICILLIN 875 MG
875 TABLET ORAL 2 TIMES DAILY
Qty: 20 TABLET | Refills: 0 | Status: SHIPPED | OUTPATIENT
Start: 2019-01-11 | End: 2019-04-28

## 2019-01-11 NOTE — TELEPHONE ENCOUNTER
Erick finished the medication for ear infection.  He still has symptoms and wants a refill.  There are currently no refills for this med so he couldn't call pharmacy directly.

## 2019-01-11 NOTE — TELEPHONE ENCOUNTER
Patient seen 12/10, given amoxicillin for ear infection  Ears feeling plugged again and making difficult to hear. Feels infection has returned  Symptoms did improve with the antibiotic.  Onset of symptoms x 3 days  Patient is c/o ear pain    To provider to advise    Felisha CONNERN, RN, CPN

## 2019-01-23 ENCOUNTER — OFFICE VISIT (OUTPATIENT)
Dept: FAMILY MEDICINE | Facility: CLINIC | Age: 69
End: 2019-01-23
Payer: COMMERCIAL

## 2019-01-23 VITALS
SYSTOLIC BLOOD PRESSURE: 130 MMHG | WEIGHT: 205 LBS | OXYGEN SATURATION: 97 % | HEART RATE: 67 BPM | TEMPERATURE: 98.1 F | BODY MASS INDEX: 29.35 KG/M2 | HEIGHT: 70 IN | DIASTOLIC BLOOD PRESSURE: 70 MMHG

## 2019-01-23 DIAGNOSIS — H61.22 IMPACTED CERUMEN OF LEFT EAR: ICD-10-CM

## 2019-01-23 PROCEDURE — 99213 OFFICE O/P EST LOW 20 MIN: CPT | Performed by: FAMILY MEDICINE

## 2019-01-23 ASSESSMENT — MIFFLIN-ST. JEOR: SCORE: 1706.12

## 2019-01-23 NOTE — PROGRESS NOTES
"SUBJECTIVE:  68 year old.The patient has a complaint of ear pain.  This started  6 weeks ago. Location . Left ear quality achy Associated symptoms are less hearing. .  Better with antibiotics in December. ROS no fever, chills       Reviewed health maintenance  Patient Active Problem List   Diagnosis     HYPERLIPIDEMIA LDL GOAL <130     Hypertension goal BP (blood pressure) < 140/90     Advanced directives, counseling/discussion     Benign non-nodular prostatic hyperplasia without lower urinary tract symptoms     Impacted cerumen of left ear     Past Medical History:   Diagnosis Date     HTN (hypertension)      Hypercholesteremia      Leucopenia        OBJECTIVE:  no apparent distress  /70   Pulse 67   Temp 98.1  F (36.7  C) (Oral)   Ht 1.778 m (5' 10\")   Wt 93 kg (205 lb)   SpO2 97%   BMI 29.41 kg/m      Canals with wax  I cleared with a ear wash  Normal tm after washing ears       ICD-10-CM    1. Impacted cerumen of left ear H61.22     PLAN: reassurance  Over  half of theTotal time 15 minutes spent in cordination care. Discussed diagnoses, prognoses and treatment.       "

## 2019-04-28 ENCOUNTER — OFFICE VISIT (OUTPATIENT)
Dept: URGENT CARE | Facility: URGENT CARE | Age: 69
End: 2019-04-28
Payer: COMMERCIAL

## 2019-04-28 VITALS
SYSTOLIC BLOOD PRESSURE: 136 MMHG | OXYGEN SATURATION: 96 % | HEART RATE: 68 BPM | TEMPERATURE: 97.9 F | DIASTOLIC BLOOD PRESSURE: 82 MMHG | BODY MASS INDEX: 29.41 KG/M2 | WEIGHT: 205 LBS

## 2019-04-28 DIAGNOSIS — H60.393 INFECTIVE OTITIS EXTERNA, BILATERAL: ICD-10-CM

## 2019-04-28 DIAGNOSIS — H66.002 ACUTE SUPPURATIVE OTITIS MEDIA OF LEFT EAR WITHOUT SPONTANEOUS RUPTURE OF TYMPANIC MEMBRANE, RECURRENCE NOT SPECIFIED: Primary | ICD-10-CM

## 2019-04-28 DIAGNOSIS — J01.90 ACUTE SINUSITIS WITH SYMPTOMS > 10 DAYS: ICD-10-CM

## 2019-04-28 DIAGNOSIS — H61.21 IMPACTED CERUMEN OF RIGHT EAR: ICD-10-CM

## 2019-04-28 PROCEDURE — 99214 OFFICE O/P EST MOD 30 MIN: CPT | Mod: 25 | Performed by: FAMILY MEDICINE

## 2019-04-28 PROCEDURE — 69209 REMOVE IMPACTED EAR WAX UNI: CPT | Performed by: FAMILY MEDICINE

## 2019-04-28 RX ORDER — CIPROFLOXACIN AND DEXAMETHASONE 3; 1 MG/ML; MG/ML
4 SUSPENSION/ DROPS AURICULAR (OTIC) 2 TIMES DAILY
Qty: 7.5 ML | Refills: 0 | Status: SHIPPED | OUTPATIENT
Start: 2019-04-28 | End: 2019-06-04

## 2019-04-28 NOTE — PROGRESS NOTES
Chief complaint: ear concerns    Accompanied by wife    Had an ear infection in January    Has had drainage off and on for couple of months    Right ear has been plugged for about a week  Left ear started getting more plugged for past couple of days  Wife thinks left ear is also more red  Other symptoms: positive  cough, colds, sinus congestion  Fever: No  Tried over the counter medications without relief  No fevers or chills chest pain or shortness of breath   No rash  Ill-contacts: airplane -frequently travels for work   Because of persistent and worsening symptoms came in to be seen    Problem list and histories reviewed & adjusted, as indicated.  Additional history: as documented    Problem list, Medication list, Allergies, and Medical/Social/Surgical histories reviewed in [facility] and updated as appropriate.    ROS:  Constitutional, HEENT, cardiovascular, pulmonary, gi and gu systems are negative, except as otherwise noted.    OBJECTIVE:                                                    /82   Pulse 68   Temp 97.9  F (36.6  C) (Oral)   Wt 93 kg (205 lb)   SpO2 96%   BMI 29.41 kg/m    Body mass index is 29.41 kg/m .  GENERAL: healthy, alert and no distress  EYES: pink palpebral conjunctiva, anicteric sclera, pupils equally reactive to light and accomodation, extraocular muscles intact full and equal.  ENT: midline nasal septum, positive  nasal congestion   Bilateral external auditory canal towards the area of the pinna has erythema and flaking of the skin   Initially had right ear cerumen impaction relieved by ear flushing   Left ear:no tragal tenderness, no mastoid tenderness erythematous, bulging and with yellowish effusion tympaninc membrane   Right ear: no tragal tenderness, no mastoid tenderness dull, erythematous and bulging tympaninc membrane   NECK: no adenopathy, no asymmetry or  masses  RESP: lungs clear to auscultation - no rales, rhonchi or wheezes  CV: regular rate and rhythm, normal S1 S2,  no S3 or S4, no murmur, click or rub, no peripheral edema and peripheral pulses strong  ABDOMEN: soft, nontender, no hepatosplenomegaly, no masses and bowel sounds normal  MS: no gross musculoskeletal defects noted, no edema  NEURO: Normal strength and tone, mentation intact and speech normal    Diagnostic Test Results:  No results found for this or any previous visit (from the past 24 hour(s)).     ASSESSMENT/PLAN:                                                        ICD-10-CM    1. Acute suppurative otitis media of left ear without spontaneous rupture of tympanic membrane, recurrence not specified H66.002 amoxicillin-clavulanate (AUGMENTIN) 875-125 MG tablet   2. Acute sinusitis with symptoms > 10 days J01.90 amoxicillin-clavulanate (AUGMENTIN) 875-125 MG tablet   3. Impacted cerumen of right ear H61.21    4. Infective otitis externa, bilateral H60.393 ciprofloxacin-dexamethasone (CIPRODEX) 0.3-0.1 % otic suspension       Prescribed with augmentin  Side effects discussed warned about GI side effects and risk of cdiff.  ciprodex  Ear flushing done   Recommend follow up with primary care provider if no relief in 3 days, sooner if worse  Needs ear recheck with primary care provider in 2-4 weeks  Adverse reactions of medications discussed.  Over the counter medications discussed.   Aware to come back in if with worsening symptoms or if no relief despite treatment plan  Patient voiced understanding and had no further questions.     MD Yana Ocampo MD  [facility]

## 2019-06-04 ENCOUNTER — OFFICE VISIT (OUTPATIENT)
Dept: FAMILY MEDICINE | Facility: CLINIC | Age: 69
End: 2019-06-04
Payer: COMMERCIAL

## 2019-06-04 VITALS
HEART RATE: 84 BPM | SYSTOLIC BLOOD PRESSURE: 135 MMHG | OXYGEN SATURATION: 97 % | TEMPERATURE: 98.4 F | DIASTOLIC BLOOD PRESSURE: 87 MMHG | RESPIRATION RATE: 15 BRPM

## 2019-06-04 DIAGNOSIS — R05.9 COUGH: Primary | ICD-10-CM

## 2019-06-04 DIAGNOSIS — I10 HYPERTENSION GOAL BP (BLOOD PRESSURE) < 140/90: ICD-10-CM

## 2019-06-04 PROCEDURE — 99214 OFFICE O/P EST MOD 30 MIN: CPT | Performed by: INTERNAL MEDICINE

## 2019-06-04 RX ORDER — BENZONATATE 100 MG/1
100 CAPSULE ORAL 3 TIMES DAILY PRN
Qty: 20 CAPSULE | Refills: 0 | Status: SHIPPED | OUTPATIENT
Start: 2019-06-04 | End: 2019-07-11

## 2019-06-04 RX ORDER — AZITHROMYCIN 250 MG/1
TABLET, FILM COATED ORAL
Qty: 6 TABLET | Refills: 0 | Status: SHIPPED | OUTPATIENT
Start: 2019-06-04 | End: 2019-06-25

## 2019-06-04 RX ORDER — ALBUTEROL SULFATE 90 UG/1
2 AEROSOL, METERED RESPIRATORY (INHALATION) EVERY 4 HOURS PRN
Qty: 18 G | Refills: 0 | Status: SHIPPED | OUTPATIENT
Start: 2019-06-04 | End: 2019-06-25

## 2019-06-04 ASSESSMENT — PAIN SCALES - GENERAL: PAINLEVEL: NO PAIN (0)

## 2019-06-04 NOTE — PROGRESS NOTES
SUBJECTIVE:  Erick Yusuf is an 68 year old male who presents for cough and cold symptoms.  Has had a productive cough bringing up some darkish sputum.  His boss has had cough for six weeks.  Cough started for pt about 10 days ago and has worsened over past few days.  No ear pain.  Some nasal congestion which is mild.  Throat is a little sore.  No n/v/d.  No fevers, chills, sweats. No h/o asthma or copd but does have h/o lung injury as a child.  No chest pain or pressure or tightness.  Has been in [place], [place], [place] in past six weeks and was healthy during those trips.  Otc cough syrup taken which helps minimally.      PMH:   has a past medical history of HTN (hypertension), Hypercholesteremia, and Leucopenia.  Patient Active Problem List   Diagnosis     HYPERLIPIDEMIA LDL GOAL <130     Hypertension goal BP (blood pressure) < 140/90     Advanced directives, counseling/discussion     Benign non-nodular prostatic hyperplasia without lower urinary tract symptoms     Impacted cerumen of left ear     Social History     Socioeconomic History     Marital status:      Spouse name: None     Number of children: None     Years of education: None     Highest education level: None   Occupational History     None   Social Needs     Financial resource strain: None     Food insecurity:     Worry: None     Inability: None     Transportation needs:     Medical: None     Non-medical: None   Tobacco Use     Smoking status: Never Smoker     Smokeless tobacco: Never Used   Substance and Sexual Activity     Alcohol use: Yes     Comment: occasionally     Drug use: No     Sexual activity: Yes     Partners: Female   Lifestyle     Physical activity:     Days per week: None     Minutes per session: None     Stress: None   Relationships     Social connections:     Talks on phone: None     Gets together: None     Attends Jew service: None     Active member of club or organization: None     Attends meetings of clubs  or organizations: None     Relationship status: None     Intimate partner violence:     Fear of current or ex partner: None     Emotionally abused: None     Physically abused: None     Forced sexual activity: None   Other Topics Concern     Parent/sibling w/ CABG, MI or angioplasty before 65F 55M? Not Asked   Social History Narrative     None     Family History   Problem Relation Age of Onset     Diabetes Father      Heart Disease Brother        ALLERGIES:  Patient has no known allergies.    Current Outpatient Medications   Medication     hydrochlorothiazide (HYDRODIURIL) 25 MG tablet     losartan (COZAAR) 25 MG tablet     multivitamin, therapeutic with minerals (MULTI-VITAMIN) TABS     simvastatin (ZOCOR) 20 MG tablet     tamsulosin (FLOMAX) 0.4 MG capsule     No current facility-administered medications for this visit.          ROS:  ROS is done and is negative for general/constitutional, eye, ENT, Respiratory, cardiovascular, GI, , Skin, musculoskeletal except as noted elsewhere.  All other review of systems negative except as noted elsewhere.      OBJECTIVE:  /87   Pulse 84   Temp 98.4  F (36.9  C) (Oral)   Resp 15   SpO2 97%   GENERAL APPEARANCE: Alert, in no acute distress  EYES: normal  EARS: External ears normal. Canals clear. TMs with mild clear effusions, no ertythema  NOSE:mild clear discharge and mildly inflamed mucosa  OROPHARYNX:normal  NECK:No adenopathy,masses or thyromegaly  RESP: coarse upper airway noise, no crackles, occasional slight end exp wheeze  CV:regular rate and rhythm and no murmurs, clicks, or gallops  ABDOMEN: Abdomen soft, non-tender. BS normal. No masses, organomegaly  SKIN: no ulcers, lesions or rash  MUSCULOSKELETAL:Musculoskeletal normal      RESULTS  Results for orders placed or performed in visit on 10/30/18   Basic metabolic panel  (Ca, Cl, CO2, Creat, Gluc, K, Na, BUN)   Result Value Ref Range    Sodium 144 133 - 144 mmol/L    Potassium 3.6 3.4 - 5.3 mmol/L     Chloride 106 94 - 109 mmol/L    Carbon Dioxide 29 20 - 32 mmol/L    Anion Gap 9 3 - 14 mmol/L    Glucose 87 70 - 99 mg/dL    Urea Nitrogen 21 7 - 30 mg/dL    Creatinine 1.19 0.66 - 1.25 mg/dL    GFR Estimate 61 >60 mL/min/1.7m2    GFR Estimate If Black 74 >60 mL/min/1.7m2    Calcium 9.5 8.5 - 10.1 mg/dL   PSA, screen   Result Value Ref Range    PSA 3.91 0 - 4 ug/L   .  No results found for this or any previous visit (from the past 48 hour(s)).    ASSESSMENT/PLAN:    ASSESSMENT / PLAN:  (R05) Cough  (primary encounter diagnosis)  Comment: has had for 10 days and has worsened over past 3 days rather than improving.  Will tx with zmax to cover atypicals.  Also albuterol and tessalon prn for cough.  Plan: albuterol (PROAIR HFA/PROVENTIL HFA/VENTOLIN         HFA) 108 (90 Base) MCG/ACT inhaler,         azithromycin (ZITHROMAX) 250 MG tablet,         benzonatate (TESSALON) 100 MG capsule        Reviewed medication instructions and side effects. Follow up if experiences side effects.. I reviewed supportive care, otc meds to use if needed, expected course, and signs of concern.  Follow up as needed or if he does not improve within 5 day(s) or if worsens in any way.  Reviewed red flag symptoms and is to go to the ER if experiences any of these.      (I10) Hypertension goal BP (blood pressure) < 140/90  Comment: BP is within goal range today  Plan: continue routine meds and routine f/u.  Advised pt that BP can fluctuate more than usual when ill.    See [facility] for orders, medications, letters, patient instructions    Vida Gonzalez M.D.

## 2019-06-04 NOTE — NURSING NOTE
"Chief Complaint   Patient presents with     Cough     c/o cough x 2 days     Health Maintenance      Due: Medicare Wellness/FIT/Zoster/Imm       Initial /87   Pulse 84   Temp 98.4  F (36.9  C) (Oral)   Resp 15   SpO2 97%  Estimated body mass index is 29.41 kg/m  as calculated from the following:    Height as of 1/23/19: 1.778 m (5' 10\").    Weight as of 4/28/19: 93 kg (205 lb).  Medication Reconciliation: complete  Tisha Anne MA    "

## 2019-06-24 ENCOUNTER — TELEPHONE (OUTPATIENT)
Dept: URGENT CARE | Facility: URGENT CARE | Age: 69
End: 2019-06-24

## 2019-06-24 NOTE — TELEPHONE ENCOUNTER
Per 6/4/19 OV note needs to be seen if not better.  Left message on answering machine for patient to call back. Laila BROOKS, [phone]

## 2019-06-24 NOTE — TELEPHONE ENCOUNTER
Patient wife calling on behalf of patient. No improvement would like refill from 6/4/19. PCP not avail until 7/1/2019. Would like to know if it's possible to get refill before then.

## 2019-06-25 ENCOUNTER — ANCILLARY PROCEDURE (OUTPATIENT)
Dept: GENERAL RADIOLOGY | Facility: CLINIC | Age: 69
End: 2019-06-25
Attending: INTERNAL MEDICINE
Payer: COMMERCIAL

## 2019-06-25 ENCOUNTER — OFFICE VISIT (OUTPATIENT)
Dept: FAMILY MEDICINE | Facility: CLINIC | Age: 69
End: 2019-06-25
Payer: COMMERCIAL

## 2019-06-25 VITALS
HEART RATE: 77 BPM | SYSTOLIC BLOOD PRESSURE: 138 MMHG | WEIGHT: 207 LBS | RESPIRATION RATE: 16 BRPM | OXYGEN SATURATION: 96 % | DIASTOLIC BLOOD PRESSURE: 92 MMHG | BODY MASS INDEX: 29.7 KG/M2 | TEMPERATURE: 98.4 F

## 2019-06-25 DIAGNOSIS — R05.9 COUGH: ICD-10-CM

## 2019-06-25 DIAGNOSIS — R05.9 COUGH: Primary | ICD-10-CM

## 2019-06-25 DIAGNOSIS — I10 HYPERTENSION GOAL BP (BLOOD PRESSURE) < 140/90: ICD-10-CM

## 2019-06-25 PROCEDURE — 99214 OFFICE O/P EST MOD 30 MIN: CPT | Performed by: INTERNAL MEDICINE

## 2019-06-25 PROCEDURE — 71046 X-RAY EXAM CHEST 2 VIEWS: CPT

## 2019-06-25 RX ORDER — ALBUTEROL SULFATE 90 UG/1
2 AEROSOL, METERED RESPIRATORY (INHALATION) EVERY 4 HOURS PRN
Qty: 18 G | Refills: 0 | Status: SHIPPED | OUTPATIENT
Start: 2019-06-25 | End: 2020-08-24

## 2019-06-25 NOTE — PROGRESS NOTES
SUBJECTIVE:  Erick Yusuf is an 68 year old male who presents for congestion.  Had a cough and congestion for a while and was put on zmax and seemed to improve but not quite normal.  Occasionally coughs still and feels like it's a deep cough and that gets hard to breathe which scares him some.  Cough is minimally productive occasionally, but more feels tight like can't breathe well.  No fevers, chills, sweats.  Maybe a little congestion.  No n/v/d except gags sometimes from cough.  No recent medication changes.  No skin rashes.  Recent travel to [place], [place], [place].  He did not get the albuterol inhaler which was prescribed at his initial visit a couple weeks ago.    PMH:   has a past medical history of HTN (hypertension), Hypercholesteremia, and Leucopenia.  Patient Active Problem List   Diagnosis     HYPERLIPIDEMIA LDL GOAL <130     Hypertension goal BP (blood pressure) < 140/90     Advanced directives, counseling/discussion     Benign non-nodular prostatic hyperplasia without lower urinary tract symptoms     Impacted cerumen of left ear     Social History     Socioeconomic History     Marital status:      Spouse name: None     Number of children: None     Years of education: None     Highest education level: None   Occupational History     None   Social Needs     Financial resource strain: None     Food insecurity:     Worry: None     Inability: None     Transportation needs:     Medical: None     Non-medical: None   Tobacco Use     Smoking status: Never Smoker     Smokeless tobacco: Never Used   Substance and Sexual Activity     Alcohol use: Yes     Comment: occasionally     Drug use: No     Sexual activity: Yes     Partners: Female   Lifestyle     Physical activity:     Days per week: None     Minutes per session: None     Stress: None   Relationships     Social connections:     Talks on phone: None     Gets together: None     Attends Congregation service: None     Active member of club or  organization: None     Attends meetings of clubs or organizations: None     Relationship status: None     Intimate partner violence:     Fear of current or ex partner: None     Emotionally abused: None     Physically abused: None     Forced sexual activity: None   Other Topics Concern     Parent/sibling w/ CABG, MI or angioplasty before 65F 55M? Not Asked   Social History Narrative     None     Family History   Problem Relation Age of Onset     Diabetes Father      Heart Disease Brother        ALLERGIES:  Patient has no known allergies.    Current Outpatient Medications   Medication     albuterol (PROAIR HFA/PROVENTIL HFA/VENTOLIN HFA) 108 (90 Base) MCG/ACT inhaler     benzonatate (TESSALON) 100 MG capsule     hydrochlorothiazide (HYDRODIURIL) 25 MG tablet     losartan (COZAAR) 25 MG tablet     multivitamin, therapeutic with minerals (MULTI-VITAMIN) TABS     simvastatin (ZOCOR) 20 MG tablet     tamsulosin (FLOMAX) 0.4 MG capsule     No current facility-administered medications for this visit.          ROS:  ROS is done and is negative for general/constitutional, eye, ENT, Respiratory, cardiovascular, GI, , Skin, musculoskeletal except as noted elsewhere.  All other review of systems negative except as noted elsewhere.      OBJECTIVE:  BP (!) 138/92   Pulse 77   Temp 98.4  F (36.9  C) (Oral)   Resp 16   Wt 93.9 kg (207 lb)   SpO2 96%   BMI 29.70 kg/m    GENERAL APPEARANCE: Alert, in no acute distress  EYES: normal  EARS: External ears normal. Canals clear. TM's normal.  NOSE:normal  OROPHARYNX:normal  NECK:No adenopathy,masses or thyromegaly  RESP: normal and clear to auscultation  CV:regular rate and rhythm and no murmurs, clicks, or gallops  ABDOMEN: Abdomen soft, non-tender. BS normal. No masses, organomegaly  SKIN: no ulcers, lesions or rash  MUSCULOSKELETAL:Musculoskeletal normal      RESULTS  CXR: bilateral fibrosis, minimal, otherwise clear with no significant change since 2010 xray, on my reading.   Await radiology reading.    No results found for this or any previous visit (from the past 48 hour(s)).    ASSESSMENT/PLAN:    ASSESSMENT / PLAN:  (R05) Cough  (primary encounter diagnosis)  Comment: currently suspect post-infectious cough with the cough being due to residual irritation/inflammation from the infection.  Currently not c/w active infection.  Will have him use albuterol for cough and f/u if not improving  Plan: XR Chest 2 Views, albuterol (PROAIR         HFA/PROVENTIL HFA/VENTOLIN HFA) 108 (90 Base)         MCG/ACT inhaler        Reviewed medication instructions and side effects. Follow up if experiences side effects.. I reviewed supportive care, otc meds to use if needed, expected course, and signs of concern.  Follow up as needed or if he does not improve within 2 week(s) or if worsens in any way.  Reviewed red flag symptoms and is to go to the ER if experiences any of these.  If cough not improve over the next two weeks, consider cough as possible side effect of losartan, or referral to pulmonology to tomi if his minimal pulmonary fibrosis is causing some issues    (I10) Hypertension goal BP (blood pressure) < 140/90  Comment: BP above goal  Plan: continue medication for now. Recheck BP in 1-2 weeks with a nurse visit, [place] pharmacy visit, or a visit to primary care doctor.      See [facility] for orders, medications, letters, patient instructions    Vida Gonzalez M.D.

## 2019-06-26 ENCOUNTER — TELEPHONE (OUTPATIENT)
Dept: FAMILY MEDICINE | Facility: CLINIC | Age: 69
End: 2019-06-26

## 2019-06-26 NOTE — TELEPHONE ENCOUNTER
Panel Management Review      Patient has the following on his problem list:   Hypertension   Last three blood pressure readings:  BP Readings from Last 3 Encounters:   06/25/19 (!) 138/92   06/04/19 135/87   04/28/19 136/82     Blood pressure: FAILED    HTN Guidelines:  Less than 140/90      Composite cancer screening  Chart review shows that this patient is due/due soon for the following None  Summary:    Patient is due/failing the following:   FIT    Action needed:  Was just seen     Type of outreach:    none needed - just seen     Questions for provider review:    None                                                                                                                                    Sara Byrd M.A.       Chart routed to n/a .

## 2019-06-30 ENCOUNTER — OFFICE VISIT (OUTPATIENT)
Dept: URGENT CARE | Facility: URGENT CARE | Age: 69
End: 2019-06-30
Payer: COMMERCIAL

## 2019-06-30 VITALS
DIASTOLIC BLOOD PRESSURE: 80 MMHG | WEIGHT: 207 LBS | SYSTOLIC BLOOD PRESSURE: 152 MMHG | OXYGEN SATURATION: 98 % | RESPIRATION RATE: 16 BRPM | TEMPERATURE: 98.5 F | BODY MASS INDEX: 29.7 KG/M2 | HEART RATE: 69 BPM

## 2019-06-30 DIAGNOSIS — I10 HYPERTENSION GOAL BP (BLOOD PRESSURE) < 140/90: ICD-10-CM

## 2019-06-30 DIAGNOSIS — J20.9 ACUTE BRONCHITIS TREATED WITH ANTIBIOTICS IN THE PAST 60 DAYS: Primary | ICD-10-CM

## 2019-06-30 PROCEDURE — 99214 OFFICE O/P EST MOD 30 MIN: CPT | Performed by: FAMILY MEDICINE

## 2019-06-30 RX ORDER — CEFDINIR 300 MG/1
300 CAPSULE ORAL 2 TIMES DAILY
Qty: 20 CAPSULE | Refills: 0 | Status: SHIPPED | OUTPATIENT
Start: 2019-06-30 | End: 2019-07-11

## 2019-06-30 NOTE — PROGRESS NOTES
Chief complaint: cough    Accompanied by wife     3-4 weeks now  Coughing congestion     Had recent travel in [place]    6/4 treated with zithromax benzonatate     Patient followed up 6/25 with minimal improvement but still persistent cough    Chest xray done   Advised albuterol inhaler - patient however hasn't picked up because he is worried that he would be addicted to it.    Was advised pulmonology follow up if persist incase minimal pulmonary fibrosis may be causing issues.    The last week since being seen now having more cough and congestion.    BP elevated today but asymptomatic. No headache no blurring of vision no chest pain no shortness of breath no dizziness no unsteadiness no numbness no weakness  Fever No  Sinus congestion/sinus pain mild  Wheezing: No  Chest pain or exertional shortness of breath: NO   Exposure to pertussis or pertussis like symptoms: No  Orthopnea, worsening edema, pnd: NO  Rash: NO  Tried OTC medications without relief  No hemoptysis.  Worsening symptoms hence patient came in to be seen     Problem list and histories reviewed & adjusted, as indicated.  Additional history: as documented    Problem list, Medication list, Allergies, and Medical/Social/Surgical histories reviewed in [facility] and updated as appropriate.    ROS:  Constitutional, HEENT, cardiovascular, pulmonary, gi and gu systems are negative, except as otherwise noted.    OBJECTIVE:                                                    /80   Pulse 69   Temp 98.5  F (36.9  C) (Oral)   Resp 16   Wt 93.9 kg (207 lb)   SpO2 98%   BMI 29.70 kg/m    Body mass index is 29.7 kg/m .  GENERAL: healthy, alert and no distress  EYES: pink palpebral conjunctiva, anicteric sclera  ENT: midline nasal septum normal ear exam. Pallor  sinuses.   Mouth: moist buccal mucosa nonhyperemic posterior pharyngeal wall. No tonsillar enlargement or cellulitis  NECK: no adenopathy, no asymmetry, masses, or scars and thyroid normal to  palpation  RESP: lungs clear to auscultation - No  rales, rhonchi or wheezes    CV: regular rate and rhythm, normal S1 S2, no S3 or S4,  No murmurs, click or rub  SKIN: no visible rashes noted  Pscyh: Appropriate mood and affect  MS: no gross musculoskeletal defects noted    Diagnostic Test Results:  none      ASSESSMENT/PLAN:                                                        ICD-10-CM    1. Acute bronchitis treated with antibiotics in the past 60 days J20.9 cefdinir (OMNICEF) 300 MG capsule   2. Hypertension goal BP (blood pressure) < 140/90 I10      Worsening more productive now  Reasonable to try a second dose of antibiotic - prescribed with omnicef  Recommend pulmonology follow up if symptoms persist ( history of pulmonary fibrosis per patient and wife)  Patient refusing to do albuterol inhaler- no wheezing heard today.  Patient has some pallor in nasal passages- consider allergic component- trial zyrtec   Adverse reactions of medications discussed.  Over the counter medications discussed.   Aware to come back in if with worsening symptoms or if no relief despite treatment plan  Patient voiced understanding and had no further questions.     Your blood pressure reading was elevated today.  Recommend that you have it re-checked either at home or at our clinic within a week  Avoid cold medicines that have pseudoephedrin in it, or Sudafed. You may take regular or plain Robitussin or Mucinex  If you are unsure, please ask the pharmacist    If your blood pressure top number (systolic) 180 and above, or the bottom number (diastolic) 120 and above, you need to be seen immediately.  If persistently elevated top number 140 and above, or the bottom number 90 and above, please schedule an appointment to see a provider in clinic within a week.  If you have very elevated blood pressures, accompanied by headache, chest pain, numbness, weakness, slurring of speech, confusion, difficulty walking, call 911 and go to the  ER      MD Yana Ocampo MD  [facility]

## 2019-07-10 ENCOUNTER — TELEPHONE (OUTPATIENT)
Dept: FAMILY MEDICINE | Facility: CLINIC | Age: 69
End: 2019-07-10

## 2019-07-10 DIAGNOSIS — R05.9 COUGH: Primary | ICD-10-CM

## 2019-07-10 NOTE — TELEPHONE ENCOUNTER
Left message on answering machine for patient wife to call back to [phone].  Susan CONNERN, RN

## 2019-07-10 NOTE — TELEPHONE ENCOUNTER
Pt wife notified of provider message as written.  He does have appointment scheduled with Dr. Rushing tomorrow so will start with that appointment.  Susan CONNERN, RN

## 2019-07-10 NOTE — TELEPHONE ENCOUNTER
To see a pulmonologist may take a very long time.  I would prefer they see Dr Sorenson or myself.  Referral sent

## 2019-07-10 NOTE — TELEPHONE ENCOUNTER
Spouse calling on behalf of patient. He has been seen several times for cough/breathing issues. Most recently he saw Dr. Crane at urgent care who suggested he see a pulmonologist. They would like to get a referral for this.

## 2019-07-10 NOTE — TELEPHONE ENCOUNTER
RN pended referral.    -- Is there a certain location you would like patient to go?  -- Any other diagnosis other than cough?    Nimo Guido BSN, RN

## 2019-07-11 ENCOUNTER — OFFICE VISIT (OUTPATIENT)
Dept: FAMILY MEDICINE | Facility: CLINIC | Age: 69
End: 2019-07-11
Payer: COMMERCIAL

## 2019-07-11 VITALS
RESPIRATION RATE: 18 BRPM | DIASTOLIC BLOOD PRESSURE: 80 MMHG | BODY MASS INDEX: 28.63 KG/M2 | HEIGHT: 70 IN | WEIGHT: 200 LBS | OXYGEN SATURATION: 98 % | TEMPERATURE: 98.2 F | SYSTOLIC BLOOD PRESSURE: 139 MMHG | HEART RATE: 75 BPM

## 2019-07-11 DIAGNOSIS — Z12.11 SPECIAL SCREENING FOR MALIGNANT NEOPLASMS, COLON: ICD-10-CM

## 2019-07-11 DIAGNOSIS — R05.9 COUGH: Primary | ICD-10-CM

## 2019-07-11 DIAGNOSIS — J30.2 SEASONAL ALLERGIC RHINITIS, UNSPECIFIED TRIGGER: ICD-10-CM

## 2019-07-11 PROCEDURE — 99214 OFFICE O/P EST MOD 30 MIN: CPT | Performed by: FAMILY MEDICINE

## 2019-07-11 RX ORDER — FLUTICASONE PROPIONATE 50 MCG
1 SPRAY, SUSPENSION (ML) NASAL DAILY
Qty: 17 ML | Refills: 3 | Status: SHIPPED | OUTPATIENT
Start: 2019-07-11 | End: 2020-08-24

## 2019-07-11 ASSESSMENT — MIFFLIN-ST. JEOR: SCORE: 1683.44

## 2019-07-11 ASSESSMENT — PAIN SCALES - GENERAL: PAINLEVEL: NO PAIN (0)

## 2019-07-11 NOTE — NURSING NOTE
"Chief Complaint   Patient presents with     URI   cough x 6 weeks - clear sputum - congested nose - runny nose - headaches -     Initial BP (!) 170/99   Pulse 75   Temp 98.2  F (36.8  C) (Oral)   Resp 18   Ht 1.778 m (5' 10\")   Wt 90.7 kg (200 lb)   SpO2 98%   BMI 28.70 kg/m   Estimated body mass index is 28.7 kg/m  as calculated from the following:    Height as of this encounter: 1.778 m (5' 10\").    Weight as of this encounter: 90.7 kg (200 lb).  Medication Reconciliation: complete  Sara Byrd M.A.    "

## 2019-07-11 NOTE — PROGRESS NOTES
"SUBJECTIVE:  68 year old.The patient has a complaint of cough.  This started 6 weeks ago. quality productive Associated symptoms are fatigue.  Brought on by nighttime .  Better with nothing and has tried antibiotics. ROS no fever , chills     Sneezes a lot  Reviewed health maintenance  Patient Active Problem List   Diagnosis     HYPERLIPIDEMIA LDL GOAL <130     Hypertension goal BP (blood pressure) < 140/90     Advanced directives, counseling/discussion     Benign non-nodular prostatic hyperplasia without lower urinary tract symptoms     Impacted cerumen of left ear     Past Medical History:   Diagnosis Date     HTN (hypertension)      Hypercholesteremia      Leucopenia        OBJECTIVE:  no apparent distress  /80   Pulse 75   Temp 98.2  F (36.8  C) (Oral)   Resp 18   Ht 1.778 m (5' 10\")   Wt 90.7 kg (200 lb)   SpO2 98%   BMI 28.70 kg/m    Post nasal drip  Pharynx clear  No cervical lymph  LUNGS:  CTA B/L, no wheezing or crackles.   Cardiovascular: negative, PMI normal. No lifts, heaves, or thrills. RRR. No murmurs, clicks gallops or rub   Gastrointestinal: Abdomen soft, non-tender. BS normal. No masses, organomegaly       ICD-10-CM    1. Cough R05 fluticasone (FLONASE) 50 MCG/ACT nasal spray   2. Special screening for malignant neoplasms, colon Z12.11 Fecal colorectal cancer screen (FIT)   3. Seasonal allergic rhinitis, unspecified trigger J30.2     PLAN: see instructions  If fails then trial of omeprazole      "

## 2019-07-16 ENCOUNTER — TELEPHONE (OUTPATIENT)
Dept: FAMILY MEDICINE | Facility: CLINIC | Age: 69
End: 2019-07-16

## 2019-07-16 DIAGNOSIS — E78.00 HYPERCHOLESTEREMIA: ICD-10-CM

## 2019-07-16 DIAGNOSIS — N40.0 BENIGN NON-NODULAR PROSTATIC HYPERPLASIA WITHOUT LOWER URINARY TRACT SYMPTOMS: ICD-10-CM

## 2019-07-16 DIAGNOSIS — I10 HYPERTENSION GOAL BP (BLOOD PRESSURE) < 140/90: ICD-10-CM

## 2019-07-16 RX ORDER — LOSARTAN POTASSIUM 25 MG/1
25 TABLET ORAL DAILY
Qty: 90 TABLET | Refills: 1 | Status: SHIPPED | OUTPATIENT
Start: 2019-07-16 | End: 2020-02-17

## 2019-07-16 RX ORDER — TAMSULOSIN HYDROCHLORIDE 0.4 MG/1
0.4 CAPSULE ORAL DAILY
Qty: 90 CAPSULE | Refills: 1 | Status: SHIPPED | OUTPATIENT
Start: 2019-07-16 | End: 2020-02-17

## 2019-07-16 RX ORDER — SIMVASTATIN 20 MG
20 TABLET ORAL AT BEDTIME
Qty: 90 TABLET | Refills: 1 | Status: SHIPPED | OUTPATIENT
Start: 2019-07-16 | End: 2020-02-17

## 2019-07-16 RX ORDER — HYDROCHLOROTHIAZIDE 25 MG/1
25 TABLET ORAL DAILY
Qty: 90 TABLET | Refills: 1 | Status: SHIPPED | OUTPATIENT
Start: 2019-07-16 | End: 2020-02-17

## 2019-07-16 NOTE — TELEPHONE ENCOUNTER
"To Dr. Sancho Rushing to advise on med refill requests.  Have pended the meds. Is overdue for cmp and lipid panel; have pended those as well.   Patient at clinic on 7/11 for cough.  Per wife: he is using the zyrtec, didn't start the flonase, and his cough is \"way better\" \"90% better; sleeping all night\".  Wife states that his runny nose has gotten better but he still has a little bit of a cough; once every couple of hours.  Wife states would like to change the losartan to something else;  thinking changing the losartan to lisinopril may be appropriate.  I reviewed his records; had been on lisinopril before.  It looks like it was discontinued 11/2017 due to cough and he was started on the losartan.   Please advise.  Not feeling needing to see Dr. Sancho Rushing again but willing to do the fasting labwork.    "

## 2019-07-16 NOTE — TELEPHONE ENCOUNTER
I am not sure why she wants his medication backed to lisinopril when it caused a cough.  I think the cough now if from allergies. Is there another cough that he had before?

## 2019-07-16 NOTE — TELEPHONE ENCOUNTER
Dr. Sancho Rushing:  Please sign pended orders.  Patient in agreement to continue the losartan and continue the zyrtec for probable allergy symptoms.  Will add the flonase if feels he needs to.  Fasting lab only appointment made for next Friday for lipids and cmp.  Laila Johnson RN

## 2019-07-16 NOTE — TELEPHONE ENCOUNTER
Reason for Call:  Medication or medication refill:    Do you use a [facility]?  Name of the pharmacy and phone number for the current request:  [facility]    Name of the medication requested:   tamsulosin  Simvastatin  Hydrochlorothiazide  Losartan      Other request:  Spoke with spouse. She states that Dr. Rushing mentioned changing him from Losartan to Lisinopril due to a cough he has developed. They would like 90 day prescriptions sent.     Can we leave a detailed message on this number? YES    Phone number patient can be reached at: Other phone number:  See above    Best Time: any    Call taken on 7/16/2019 at 7:19 AM by Mendy Chahal

## 2019-09-17 ENCOUNTER — TELEPHONE (OUTPATIENT)
Dept: FAMILY MEDICINE | Facility: CLINIC | Age: 69
End: 2019-09-17

## 2019-09-18 ENCOUNTER — TELEPHONE (OUTPATIENT)
Dept: FAMILY MEDICINE | Facility: CLINIC | Age: 69
End: 2019-09-18

## 2019-09-18 NOTE — TELEPHONE ENCOUNTER
Spouse states that patient did not get FIT test when he saw you and would like it mailed to him at address on file.    Thank you.

## 2020-02-17 ENCOUNTER — TELEPHONE (OUTPATIENT)
Dept: FAMILY MEDICINE | Facility: CLINIC | Age: 70
End: 2020-02-17

## 2020-02-17 DIAGNOSIS — N40.0 BENIGN NON-NODULAR PROSTATIC HYPERPLASIA WITHOUT LOWER URINARY TRACT SYMPTOMS: ICD-10-CM

## 2020-02-17 DIAGNOSIS — I10 HYPERTENSION GOAL BP (BLOOD PRESSURE) < 140/90: ICD-10-CM

## 2020-02-17 DIAGNOSIS — E78.00 HYPERCHOLESTEREMIA: ICD-10-CM

## 2020-02-17 RX ORDER — LOSARTAN POTASSIUM 25 MG/1
25 TABLET ORAL DAILY
Qty: 90 TABLET | Refills: 0 | Status: SHIPPED | OUTPATIENT
Start: 2020-02-17 | End: 2020-05-06

## 2020-02-17 RX ORDER — TAMSULOSIN HYDROCHLORIDE 0.4 MG/1
0.4 CAPSULE ORAL DAILY
Qty: 90 CAPSULE | Refills: 0 | Status: SHIPPED | OUTPATIENT
Start: 2020-02-17 | End: 2020-05-06

## 2020-02-17 RX ORDER — HYDROCHLOROTHIAZIDE 25 MG/1
25 TABLET ORAL DAILY
Qty: 90 TABLET | Refills: 0 | Status: SHIPPED | OUTPATIENT
Start: 2020-02-17 | End: 2020-05-06

## 2020-02-17 RX ORDER — SIMVASTATIN 20 MG
20 TABLET ORAL AT BEDTIME
Qty: 90 TABLET | Refills: 0 | Status: SHIPPED | OUTPATIENT
Start: 2020-02-17 | End: 2020-05-06

## 2020-02-17 NOTE — TELEPHONE ENCOUNTER
Spouse states that patient would like a 90 supply of simvastatin, hydrochlorothiazide, losartan, and tamsulosin until he can see you on 4-1-20.    Thank you.

## 2020-02-23 ENCOUNTER — HEALTH MAINTENANCE LETTER (OUTPATIENT)
Age: 70
End: 2020-02-23

## 2020-05-06 ENCOUNTER — TELEPHONE (OUTPATIENT)
Dept: FAMILY MEDICINE | Facility: CLINIC | Age: 70
End: 2020-05-06

## 2020-05-06 DIAGNOSIS — E78.00 HYPERCHOLESTEREMIA: ICD-10-CM

## 2020-05-06 DIAGNOSIS — N40.0 BENIGN NON-NODULAR PROSTATIC HYPERPLASIA WITHOUT LOWER URINARY TRACT SYMPTOMS: ICD-10-CM

## 2020-05-06 DIAGNOSIS — I10 HYPERTENSION GOAL BP (BLOOD PRESSURE) < 140/90: ICD-10-CM

## 2020-05-06 RX ORDER — SIMVASTATIN 20 MG
20 TABLET ORAL AT BEDTIME
Qty: 90 TABLET | Refills: 0 | Status: SHIPPED | OUTPATIENT
Start: 2020-05-06 | End: 2020-07-27

## 2020-05-06 RX ORDER — LOSARTAN POTASSIUM 25 MG/1
25 TABLET ORAL DAILY
Qty: 90 TABLET | Refills: 0 | Status: SHIPPED | OUTPATIENT
Start: 2020-05-06 | End: 2020-07-22

## 2020-05-06 RX ORDER — HYDROCHLOROTHIAZIDE 25 MG/1
25 TABLET ORAL DAILY
Qty: 90 TABLET | Refills: 0 | Status: SHIPPED | OUTPATIENT
Start: 2020-05-06 | End: 2020-07-27

## 2020-05-06 RX ORDER — TAMSULOSIN HYDROCHLORIDE 0.4 MG/1
0.4 CAPSULE ORAL DAILY
Qty: 90 CAPSULE | Refills: 0 | Status: SHIPPED | OUTPATIENT
Start: 2020-05-06 | End: 2020-07-27

## 2020-05-06 NOTE — TELEPHONE ENCOUNTER
I have refilled the prescriptions for 3 months at which time he should be seen.  It is okay to stop the losartan for 2 weeks.  There may be a increase in blood pressure during this time but over a short time of 2 weeks this should not cause harm.  If the symptoms do not go away then the cause is not this medication.    If the symptoms do go away and the blood pressure goes up then we will have to get another medication to stabilize the blood pressure.   If the symptoms do not go away I would like him to stop the hydrochlorothiazide for two weeks.

## 2020-05-06 NOTE — TELEPHONE ENCOUNTER
Reason for Call:  Medication or medication refill:    Do you use a [facility]?  Name of the pharmacy and phone number for the current request:  [facility]    Name of the medication requested:   Simvastatin   Tamulosin  hydrochlorothiazide  Losartan - patient feels he is having side effects from this medication and would like to switch to something different.       Other request: patient is in need of these medications to be refilled. Has an appointment with Dr. Rushing on 7/22. I offered to have them do an appointment with another provider this week, they declined.     Can we leave a detailed message on this number? YES    Phone number patient can be reached at: Other phone number:  [phone] (spouse So, Consent to Communicate on file)    Best Time:     Call taken on 5/6/2020 at 7:09 AM by Mendy Chahal

## 2020-05-06 NOTE — TELEPHONE ENCOUNTER
Spoke with So. I read Dr. Sancho Rushing's instructions to her twice and she repeated them back to me correctly.  States understanding of the instructions per Dr. Sancho Rushing below.  Laila Johnson RN  States has pending lab and MD appointment in July.  Laila Johnson RN

## 2020-05-06 NOTE — TELEPHONE ENCOUNTER
"Release to share Personal Health Information is identified, Kaley, spouse.  Spoke to Kaley, she reports patient has been taking Losartan x 3 years.  Wife reports patient has had a runny nose, sneezing for \"maybe 6 mos\" now.  Wife believes this is due to the Losartan, \"Maybe he should not taking this\", \"has a patchy rash, think this was caused by Losartan, gets a patchy rash then goes away\". Previously on Lisinopril, had to stop due to cough.  Thought he had gain weight, discussed wt values below show a weight loss.  \"he appears to have gained weight, I could be wrong\".  Does not have a home scale.  Wife wonders if the Losartan should stop.      Next 5 appointments (look out 90 days)    Jul 22, 2020  9:30 AM CDT  [facility] Visit with [facility] [address] [facility] [address] [phone]            BP Readings from Last 6 Encounters:   07/11/19 139/80   06/30/19 152/80   06/25/19 (!) 138/92   06/04/19 135/87   04/28/19 136/82   01/23/19 130/70     Wt Readings from Last 5 Encounters:   07/11/19 90.7 kg (200 lb)   06/30/19 93.9 kg (207 lb)   06/25/19 93.9 kg (207 lb)   04/28/19 93 kg (205 lb)   01/23/19 93 kg (205 lb)     Patient has a history of seasonal allergies.      ? Video Visit to discuss, blood pressure check?  Please advise  Silvia Lerma RN     "

## 2020-07-21 ENCOUNTER — DOCUMENTATION ONLY (OUTPATIENT)
Dept: LAB | Facility: CLINIC | Age: 70
End: 2020-07-21

## 2020-07-21 DIAGNOSIS — I10 HYPERTENSION GOAL BP (BLOOD PRESSURE) < 140/90: Primary | ICD-10-CM

## 2020-07-21 NOTE — PROGRESS NOTES
.Please place or confirm pending lab orders for upcoming lab appointment on 7/22/20  Thank you,  Kerline

## 2020-07-22 ENCOUNTER — OFFICE VISIT (OUTPATIENT)
Dept: FAMILY MEDICINE | Facility: CLINIC | Age: 70
End: 2020-07-22
Payer: COMMERCIAL

## 2020-07-22 VITALS
DIASTOLIC BLOOD PRESSURE: 88 MMHG | TEMPERATURE: 97.5 F | BODY MASS INDEX: 29.12 KG/M2 | HEIGHT: 71 IN | SYSTOLIC BLOOD PRESSURE: 134 MMHG | HEART RATE: 69 BPM | WEIGHT: 208 LBS | RESPIRATION RATE: 14 BRPM | OXYGEN SATURATION: 98 %

## 2020-07-22 DIAGNOSIS — I10 ESSENTIAL HYPERTENSION: Primary | ICD-10-CM

## 2020-07-22 DIAGNOSIS — I10 HYPERTENSION GOAL BP (BLOOD PRESSURE) < 140/90: ICD-10-CM

## 2020-07-22 LAB
ANION GAP SERPL CALCULATED.3IONS-SCNC: 7 MMOL/L (ref 3–14)
BUN SERPL-MCNC: 19 MG/DL (ref 7–30)
CALCIUM SERPL-MCNC: 9.5 MG/DL (ref 8.5–10.1)
CHLORIDE SERPL-SCNC: 106 MMOL/L (ref 94–109)
CHOLEST SERPL-MCNC: 189 MG/DL
CO2 SERPL-SCNC: 27 MMOL/L (ref 20–32)
CREAT SERPL-MCNC: 1.23 MG/DL (ref 0.66–1.25)
GFR SERPL CREATININE-BSD FRML MDRD: 59 ML/MIN/{1.73_M2}
GLUCOSE SERPL-MCNC: 108 MG/DL (ref 70–99)
HDLC SERPL-MCNC: 55 MG/DL
LDLC SERPL CALC-MCNC: 112 MG/DL
NONHDLC SERPL-MCNC: 134 MG/DL
POTASSIUM SERPL-SCNC: 3.9 MMOL/L (ref 3.4–5.3)
SODIUM SERPL-SCNC: 140 MMOL/L (ref 133–144)
TRIGL SERPL-MCNC: 111 MG/DL

## 2020-07-22 PROCEDURE — 80048 BASIC METABOLIC PNL TOTAL CA: CPT | Performed by: FAMILY MEDICINE

## 2020-07-22 PROCEDURE — 99213 OFFICE O/P EST LOW 20 MIN: CPT | Performed by: FAMILY MEDICINE

## 2020-07-22 PROCEDURE — 36415 COLL VENOUS BLD VENIPUNCTURE: CPT | Performed by: FAMILY MEDICINE

## 2020-07-22 PROCEDURE — 80061 LIPID PANEL: CPT | Performed by: FAMILY MEDICINE

## 2020-07-22 RX ORDER — AMLODIPINE BESYLATE 5 MG/1
5 TABLET ORAL DAILY
Qty: 90 TABLET | Refills: 1 | Status: SHIPPED | OUTPATIENT
Start: 2020-07-22 | End: 2021-02-10

## 2020-07-22 ASSESSMENT — MIFFLIN-ST. JEOR: SCORE: 1730.61

## 2020-07-22 NOTE — PROGRESS NOTES
"SUBJECTIVE:  69 year oldyear old male enters with  hypertension.  Pt. Has been compliant with medications and medications were reviewed.  Cough is a  side effects. No chest pain or sob. Low sodium diet.    Current Outpatient Medications:      albuterol (PROAIR HFA/PROVENTIL HFA/VENTOLIN HFA) 108 (90 Base) MCG/ACT inhaler, Inhale 2 puffs into the lungs every 4 hours as needed for other (cough), Disp: 18 g, Rfl: 0     amLODIPine (NORVASC) 5 MG tablet, Take 1 tablet (5 mg) by mouth daily, Disp: 90 tablet, Rfl: 1     fluticasone (FLONASE) 50 MCG/ACT nasal spray, Spray 1 spray into both nostrils daily, Disp: 17 mL, Rfl: 3     hydrochlorothiazide (HYDRODIURIL) 25 MG tablet, Take 1 tablet (25 mg) by mouth daily, Disp: 90 tablet, Rfl: 0     multivitamin, therapeutic with minerals (MULTI-VITAMIN) TABS, Take 1 tablet by mouth daily., Disp: , Rfl:      simvastatin (ZOCOR) 20 MG tablet, Take 1 tablet (20 mg) by mouth At Bedtime, Disp: 90 tablet, Rfl: 0     tamsulosin (FLOMAX) 0.4 MG capsule, Take 1 capsule (0.4 mg) by mouth daily, Disp: 90 capsule, Rfl: 0  Past Medical History:   Diagnosis Date     HTN (hypertension)      Hypercholesteremia      Leucopenia      Orders Only on 09/24/2019   Component Date Value Ref Range Status     Occult Blood Scn FIT 09/24/2019 Negative  NEG^Negative Final      Reviewed health maintenance  Patient Active Problem List   Diagnosis     HYPERLIPIDEMIA LDL GOAL <130     Hypertension goal BP (blood pressure) < 140/90     Advanced directives, counseling/discussion     Benign non-nodular prostatic hyperplasia without lower urinary tract symptoms     Impacted cerumen of left ear         OBJECTIVE:  no apparent distress  /88   Pulse 69   Temp 97.5  F (36.4  C) (Tympanic)   Resp 14   Ht 1.803 m (5' 11\")   Wt 94.3 kg (208 lb)   SpO2 98%   BMI 29.01 kg/m       Head: Normocephalic. No masses, lesions, tenderness or abnormalities.  Neck::Neck supple. No adenopathy. Thyroid symmetric, normal " size.    Cardiovascular: negative. No lifts, heaves, or thrills. RRR. No murmurs, clicks gallops or rubs  Respiratory. Good diaphragmatic excursion. Lungs clear  Gastrointestinal:Abdomen soft, non-tender.  No masses, organomegaly    Orders Only on 09/24/2019   Component Date Value Ref Range Status     Occult Blood Scn FIT 09/24/2019 Negative  NEG^Negative Final         ICD-10-CM    1. Essential hypertension  I10 amLODIPine (NORVASC) 5 MG tablet    PLAN: Follow up in 6 months

## 2020-07-27 ENCOUNTER — TELEPHONE (OUTPATIENT)
Dept: FAMILY MEDICINE | Facility: CLINIC | Age: 70
End: 2020-07-27

## 2020-07-27 DIAGNOSIS — I10 HYPERTENSION GOAL BP (BLOOD PRESSURE) < 140/90: ICD-10-CM

## 2020-07-27 DIAGNOSIS — E78.00 HYPERCHOLESTEREMIA: ICD-10-CM

## 2020-07-27 DIAGNOSIS — N40.0 BENIGN NON-NODULAR PROSTATIC HYPERPLASIA WITHOUT LOWER URINARY TRACT SYMPTOMS: ICD-10-CM

## 2020-07-27 RX ORDER — TAMSULOSIN HYDROCHLORIDE 0.4 MG/1
0.4 CAPSULE ORAL DAILY
Qty: 90 CAPSULE | Refills: 1 | Status: SHIPPED | OUTPATIENT
Start: 2020-07-27 | End: 2020-08-24

## 2020-07-27 RX ORDER — SIMVASTATIN 20 MG
20 TABLET ORAL AT BEDTIME
Qty: 90 TABLET | Refills: 1 | Status: SHIPPED | OUTPATIENT
Start: 2020-07-27 | End: 2021-02-10

## 2020-07-27 RX ORDER — HYDROCHLOROTHIAZIDE 25 MG/1
25 TABLET ORAL DAILY
Qty: 90 TABLET | Refills: 1 | Status: SHIPPED | OUTPATIENT
Start: 2020-07-27 | End: 2021-02-10

## 2020-07-27 NOTE — TELEPHONE ENCOUNTER
Patient is needing his hydrochlorothiazide, flomax, and simvastatin sent to his mail order pharmacy now.  Aware the losartan was discontinued and amlodipine was ordered at his 7/22/20 visit with PCP.  Dr. Sancho Rushing will need to send in the pended prescription's;  Warning comes up on the simvastatin with the amlodipine; RN cannot complete.  Laila Johnson RN

## 2020-07-27 NOTE — TELEPHONE ENCOUNTER
Spouse states her  had a med change, but she is not sure what it was. There were changes made at his last appt., but he is not sure what the change was. Spouse states consent was signed to talk to her. Ok to leave a detailed message.

## 2020-08-24 ENCOUNTER — OFFICE VISIT (OUTPATIENT)
Dept: FAMILY MEDICINE | Facility: CLINIC | Age: 70
End: 2020-08-24
Payer: COMMERCIAL

## 2020-08-24 VITALS
TEMPERATURE: 98.3 F | WEIGHT: 206 LBS | BODY MASS INDEX: 28.73 KG/M2 | SYSTOLIC BLOOD PRESSURE: 132 MMHG | DIASTOLIC BLOOD PRESSURE: 80 MMHG | OXYGEN SATURATION: 96 % | HEART RATE: 86 BPM

## 2020-08-24 DIAGNOSIS — N40.0 BENIGN NON-NODULAR PROSTATIC HYPERPLASIA WITHOUT LOWER URINARY TRACT SYMPTOMS: ICD-10-CM

## 2020-08-24 DIAGNOSIS — R35.0 FREQUENT URINATION: Primary | ICD-10-CM

## 2020-08-24 LAB
ALBUMIN UR-MCNC: ABNORMAL MG/DL
APPEARANCE UR: CLEAR
BACTERIA #/AREA URNS HPF: ABNORMAL /HPF
BILIRUB UR QL STRIP: NEGATIVE
COLOR UR AUTO: YELLOW
GLUCOSE UR STRIP-MCNC: NEGATIVE MG/DL
HGB UR QL STRIP: NEGATIVE
KETONES UR STRIP-MCNC: ABNORMAL MG/DL
LEUKOCYTE ESTERASE UR QL STRIP: NEGATIVE
MUCOUS THREADS #/AREA URNS LPF: PRESENT /LPF
NITRATE UR QL: NEGATIVE
NON-SQ EPI CELLS #/AREA URNS LPF: ABNORMAL /LPF
PH UR STRIP: 6 PH (ref 5–7)
RBC #/AREA URNS AUTO: ABNORMAL /HPF
SOURCE: ABNORMAL
SP GR UR STRIP: 1.02 (ref 1–1.03)
UROBILINOGEN UR STRIP-ACNC: 0.2 EU/DL (ref 0.2–1)
WBC #/AREA URNS AUTO: ABNORMAL /HPF

## 2020-08-24 PROCEDURE — 99213 OFFICE O/P EST LOW 20 MIN: CPT | Performed by: FAMILY MEDICINE

## 2020-08-24 PROCEDURE — 87086 URINE CULTURE/COLONY COUNT: CPT | Performed by: FAMILY MEDICINE

## 2020-08-24 PROCEDURE — 81001 URINALYSIS AUTO W/SCOPE: CPT | Performed by: FAMILY MEDICINE

## 2020-08-24 RX ORDER — TAMSULOSIN HYDROCHLORIDE 0.4 MG/1
0.8 CAPSULE ORAL DAILY
Qty: 180 CAPSULE | Refills: 3 | Status: SHIPPED | OUTPATIENT
Start: 2020-08-24 | End: 2020-08-31

## 2020-08-24 ASSESSMENT — PAIN SCALES - GENERAL: PAINLEVEL: NO PAIN (0)

## 2020-08-24 NOTE — PATIENT INSTRUCTIONS
Try the higher dose of the flomax for 2 weeks if you symptom(s) are not better call for a urology referral

## 2020-08-24 NOTE — PROGRESS NOTES
SUBJECTIVE:   Erick Yusuf is a 69 year old male who presents today for symptom of dysuria and frequency. He started having these symptoms 2 day(s) ago. He  denies hematuria, reports back pain,  denies nausea or vomiting,  denies fever or chills.  He denies a history of penile discharge.   This patient does not  have a history of frequent urinary tract infections.     No sexual encounters recently. He denies any extramarital activity.    Past Medical History:   Diagnosis Date     HTN (hypertension)      Hypercholesteremia      Leucopenia      Current Outpatient Medications   Medication Sig Dispense Refill     amLODIPine (NORVASC) 5 MG tablet Take 1 tablet (5 mg) by mouth daily 90 tablet 1     hydrochlorothiazide (HYDRODIURIL) 25 MG tablet Take 1 tablet (25 mg) by mouth daily 90 tablet 1     multivitamin, therapeutic with minerals (MULTI-VITAMIN) TABS Take 1 tablet by mouth daily.       simvastatin (ZOCOR) 20 MG tablet Take 1 tablet (20 mg) by mouth At Bedtime 90 tablet 1     tamsulosin (FLOMAX) 0.4 MG capsule Take 1 capsule (0.4 mg) by mouth daily 90 capsule 1     Social History     Tobacco Use     Smoking status: Never Smoker     Smokeless tobacco: Never Used   Substance Use Topics     Alcohol use: Yes     Comment: occasionally         OBJECTIVE:  /80   Pulse 86   Temp 98.3  F (36.8  C) (Tympanic)   Wt 93.4 kg (206 lb)   SpO2 96%   BMI 28.73 kg/m    GENERAL APPEARANCE: healthy, alert and no distress  RESP: lungs clear to auscultation - no rales, rhonchi or wheezes  CV: regular rates and rhythm, normal S1 S2, no murmur noted  ABDOMEN:  soft, nontender, no HSM or masses and bowel sounds normal  BACK: No CVA tenderness  Rectal exam: prostate 2+ enlarged without nodules and was non tender   SKIN: no suspicious lesions or rashes    Results for orders placed or performed in visit on 08/24/20   UA with Microscopic     Status: Abnormal   Result Value Ref Range    Color Urine Yellow     Appearance Urine  Clear     Glucose Urine Negative NEG^Negative mg/dL    Bilirubin Urine Negative NEG^Negative    Ketones Urine Trace (A) NEG^Negative mg/dL    Specific Gravity Urine 1.025 1.003 - 1.035    pH Urine 6.0 5.0 - 7.0 pH    Protein Albumin Urine Trace (A) NEG^Negative mg/dL    Urobilinogen Urine 0.2 0.2 - 1.0 EU/dL    Nitrite Urine Negative NEG^Negative    Blood Urine Negative NEG^Negative    Leukocyte Esterase Urine Negative NEG^Negative    Source Midstream Urine     WBC Urine 0 - 5 OTO5^0 - 5 /HPF    RBC Urine O - 2 OTO2^O - 2 /HPF    Squamous Epithelial /LPF Urine Few FEW^Few /LPF    Bacteria Urine Moderate (A) NEG^Negative /HPF    Mucous Urine Present (A) NEG^Negative /LPF        ASSESSMENT:   No signs of a  urinary tract infection or prostate infection    PLAN:  Urine culture  pending  BPH  inctrease the flomax to 0.8 mg and follow up with urology if the symptom(s) are not improved.     As per ordered above  Drink plenty of fluids.  Prevention and treatment of UTI's discussed.Signs and symptoms of pyelonephritis mentioned.

## 2020-08-24 NOTE — NURSING NOTE
"Chief Complaint   Patient presents with     UTI     painful urination, x 2 days.     Health Maintenance     order pended, wellness visit       Initial /80   Pulse 86   Temp 98.3  F (36.8  C) (Tympanic)   Wt 93.4 kg (206 lb)   SpO2 96%   BMI 28.73 kg/m   Estimated body mass index is 28.73 kg/m  as calculated from the following:    Height as of 7/22/20: 1.803 m (5' 11\").    Weight as of this encounter: 93.4 kg (206 lb).  Medication Reconciliation: complete  Bella Washington, TORIN  "

## 2020-08-25 LAB
BACTERIA SPEC CULT: NO GROWTH
Lab: NORMAL
SPECIMEN SOURCE: NORMAL

## 2020-08-28 ENCOUNTER — TELEPHONE (OUTPATIENT)
Dept: FAMILY MEDICINE | Facility: CLINIC | Age: 70
End: 2020-08-28

## 2020-08-28 DIAGNOSIS — N40.0 BENIGN NON-NODULAR PROSTATIC HYPERPLASIA WITHOUT LOWER URINARY TRACT SYMPTOMS: ICD-10-CM

## 2020-08-28 DIAGNOSIS — R35.0 FREQUENT URINATION: Primary | ICD-10-CM

## 2020-08-28 NOTE — TELEPHONE ENCOUNTER
Reason for Call: Request for an order or referral:    Order or referral being requested: urology     Date needed: as soon as possible    Has the patient been seen by the PCP for this problem? YES    Additional comments: na    Phone number Patient can be reached at:  Cell number on file:    Telephone Information:   Mobile [phone]       Best Time:  any    Can we leave a detailed message on this number?  YES    Call taken on 8/28/2020 at 8:19 AM by Daina Bliss

## 2020-08-28 NOTE — TELEPHONE ENCOUNTER
RN pended urology referral for frequent urination and BPH.  Patient already has pending appointment with Dr Martini 9/15/20.    Routing to provider to advise.  Nimo Guido BSN, RN

## 2020-08-31 ENCOUNTER — TELEPHONE (OUTPATIENT)
Dept: FAMILY MEDICINE | Facility: CLINIC | Age: 70
End: 2020-08-31

## 2020-08-31 DIAGNOSIS — N40.0 BENIGN NON-NODULAR PROSTATIC HYPERPLASIA WITHOUT LOWER URINARY TRACT SYMPTOMS: ICD-10-CM

## 2020-08-31 RX ORDER — TAMSULOSIN HYDROCHLORIDE 0.4 MG/1
0.8 CAPSULE ORAL DAILY
Qty: 180 CAPSULE | Refills: 3 | Status: SHIPPED | OUTPATIENT
Start: 2020-08-31 | End: 2021-07-23

## 2020-08-31 NOTE — TELEPHONE ENCOUNTER
Reason for call:  Medication     If this is a refill request, has the caller requested the refill from the pharmacy already? Yes     Will the patient be using a [facility]? No     Name of the pharmacy and phone number for the current request: [facility] [phone]    Name of the medication requested: tamsulosin (FLOMAX) 0.4 MG capsule    Other request: na    Phone number to reach patient:  Cell number on file:    Telephone Information:   Mobile [phone]       Best Time:  any    Can we leave a detailed message on this number?  YES    Travel screening: Not Applicable

## 2020-08-31 NOTE — TELEPHONE ENCOUNTER
New pharmacy.  Rx refilled per [facility] refill protocol.    Nimo Guido BSN, RN

## 2020-09-15 ENCOUNTER — OFFICE VISIT (OUTPATIENT)
Dept: UROLOGY | Facility: CLINIC | Age: 70
End: 2020-09-15
Payer: COMMERCIAL

## 2020-09-15 VITALS — OXYGEN SATURATION: 96 % | DIASTOLIC BLOOD PRESSURE: 100 MMHG | HEART RATE: 65 BPM | SYSTOLIC BLOOD PRESSURE: 152 MMHG

## 2020-09-15 DIAGNOSIS — R97.20 ELEVATED PROSTATE SPECIFIC ANTIGEN (PSA): ICD-10-CM

## 2020-09-15 DIAGNOSIS — N40.0 BENIGN NON-NODULAR PROSTATIC HYPERPLASIA WITHOUT LOWER URINARY TRACT SYMPTOMS: Primary | ICD-10-CM

## 2020-09-15 DIAGNOSIS — I10 HYPERTENSION GOAL BP (BLOOD PRESSURE) < 140/90: ICD-10-CM

## 2020-09-15 LAB
ALBUMIN UR-MCNC: NEGATIVE MG/DL
APPEARANCE UR: CLEAR
BILIRUB UR QL STRIP: NEGATIVE
COLOR UR AUTO: YELLOW
GLUCOSE UR STRIP-MCNC: NEGATIVE MG/DL
HGB UR QL STRIP: NEGATIVE
KETONES UR STRIP-MCNC: NEGATIVE MG/DL
LEUKOCYTE ESTERASE UR QL STRIP: NEGATIVE
NITRATE UR QL: NEGATIVE
PH UR STRIP: 7.5 PH (ref 5–7)
SOURCE: ABNORMAL
SP GR UR STRIP: 1.02 (ref 1–1.03)
UROBILINOGEN UR STRIP-ACNC: 1 EU/DL (ref 0.2–1)

## 2020-09-15 PROCEDURE — 84154 ASSAY OF PSA FREE: CPT | Mod: 90 | Performed by: UROLOGY

## 2020-09-15 PROCEDURE — 81003 URINALYSIS AUTO W/O SCOPE: CPT | Performed by: UROLOGY

## 2020-09-15 PROCEDURE — 84153 ASSAY OF PSA TOTAL: CPT | Mod: 90 | Performed by: UROLOGY

## 2020-09-15 PROCEDURE — 99204 OFFICE O/P NEW MOD 45 MIN: CPT | Mod: 25 | Performed by: UROLOGY

## 2020-09-15 PROCEDURE — 51798 US URINE CAPACITY MEASURE: CPT | Performed by: UROLOGY

## 2020-09-15 PROCEDURE — 36415 COLL VENOUS BLD VENIPUNCTURE: CPT | Performed by: UROLOGY

## 2020-09-15 PROCEDURE — 99000 SPECIMEN HANDLING OFFICE-LAB: CPT | Performed by: UROLOGY

## 2020-09-15 RX ORDER — FINASTERIDE 5 MG/1
5 TABLET, FILM COATED ORAL DAILY
Qty: 90 TABLET | Refills: 3 | Status: SHIPPED | OUTPATIENT
Start: 2020-09-15 | End: 2021-02-10

## 2020-09-15 NOTE — PROGRESS NOTES
S: Erick Yusuf is a pleasant  69 year old male who was requested to be seen by  Dr. Viveros for a consult with regard to patient's urinary complaints.  Patient complains of Hesitancy in starting urinary stream, Double voiding, Sensation of incomplete emptying, Strain to Urinate, Nocturia x 3-4 and slow urinary stream.  He has history of elevated PSA.  Symptoms have been on going for   many years(s).  Seems to be worsened over time.  His recent PSA was found to be   PSA   Date Value Ref Range Status   10/30/2018 3.91 0 - 4 ug/L Final     Comment:     Assay Method:  Chemiluminescence using Siemens Vista analyzer   .  His AUA Symptom Score:  13.  His QOL score:  3.  Recently his flomax was double which helps with LUTS.    Current Outpatient Medications   Medication Sig Dispense Refill     amLODIPine (NORVASC) 5 MG tablet Take 1 tablet (5 mg) by mouth daily 90 tablet 1     finasteride (PROSCAR) 5 MG tablet Take 1 tablet (5 mg) by mouth daily 90 tablet 3     hydrochlorothiazide (HYDRODIURIL) 25 MG tablet Take 1 tablet (25 mg) by mouth daily 90 tablet 1     multivitamin, therapeutic with minerals (MULTI-VITAMIN) TABS Take 1 tablet by mouth daily.       simvastatin (ZOCOR) 20 MG tablet Take 1 tablet (20 mg) by mouth At Bedtime 90 tablet 1     tamsulosin (FLOMAX) 0.4 MG capsule Take 2 capsules (0.8 mg) by mouth daily 180 capsule 3     No Known Allergies  Past Medical History:   Diagnosis Date     HTN (hypertension)      Hypercholesteremia      Leucopenia      Past Surgical History:   Procedure Laterality Date     NO HISTORY OF SURGERY        Family History   Problem Relation Age of Onset     Diabetes Father      Heart Disease Brother      He does not have a family history of prostate cancer.  Social History     Socioeconomic History     Marital status:      Spouse name: None     Number of children: None     Years of education: None     Highest education level: None   Occupational History     None   Social  Needs     Financial resource strain: None     Food insecurity     Worry: None     Inability: None     Transportation needs     Medical: None     Non-medical: None   Tobacco Use     Smoking status: Never Smoker     Smokeless tobacco: Never Used   Substance and Sexual Activity     Alcohol use: Yes     Comment: occasionally     Drug use: No     Sexual activity: Yes     Partners: Female   Lifestyle     Physical activity     Days per week: None     Minutes per session: None     Stress: None   Relationships     Social connections     Talks on phone: None     Gets together: None     Attends Jain service: None     Active member of club or organization: None     Attends meetings of clubs or organizations: None     Relationship status: None     Intimate partner violence     Fear of current or ex partner: None     Emotionally abused: None     Physically abused: None     Forced sexual activity: None   Other Topics Concern     Parent/sibling w/ CABG, MI or angioplasty before 65F 55M? Not Asked   Social History Narrative     None        REVIEW OF SYSTEMS  =================  C: NEGATIVE for fever, chills, change in weight  I: NEGATIVE for worrisome rashes, moles or lesions  E/M: NEGATIVE for ear, mouth and throat problems  R: NEGATIVE for significant cough or SHORTNESS OF BREATH  CV:  NEGATIVE for chest pain, palpitations or peripheral edema  GI: NEGATIVE for nausea, abdominal pain, heartburn, or change in bowel habits  NEURO: NEGATIVE numbness/weakness  : see HPI  PSYCH: NEGATIVE depression/anxiety  LYmph: no new enlarged lymph nodes  Ortho: no new trauma/movements           O: Exam:BP (!) 152/100 (BP Location: Right arm, Patient Position: Chair, Cuff Size: Adult Large)   Pulse 65   SpO2 96%    Constitutional: healthy, alert and no distress  Cardiovascular: negative, PMI normal.   Respiratory: negative, no evidence of respiratory distress  Gastrointestinal: Abdomen soft, non-tender. BS normal. No masses,  organomegaly  : penis no discharge. Testis no masses.  No scrotal skin lesion.  Prostate 50 gm smooth.    Musculoskeletal: extremities normal- no gross deformities noted, gait normal and normal muscle tone  Skin: no suspicious lesions or rashes  Neurologic: Alert and oriented  Psychiatric: mentation appears normal. and affect normal/bright  Hematologic/Lymphatic/Immunologic: normal ant/post cervical, axillary, supraclavicular and inguinal nodes    Assessment/Plan:   (N40.0) Benign non-nodular prostatic hyperplasia without lower urinary tract symptoms  (primary encounter diagnosis)  Comment: PVR 66 ml     (R97.20) Elevated prostate specific antigen (PSA)  Comment:    Plan: recheck total and free psa today    HTN: Derrick to follow up with Primary Care provider regarding elevated blood pressure.      Recommendations:   Medical management versus surgical management versus office treatments, were discussed with patient at length. Pros and Cons discussed.  Add proscar to flomax.  See in one year for recheck.

## 2020-09-16 LAB
PSA FREE MFR SERPL: 40 %
PSA FREE SERPL-MCNC: 1.8 NG/ML
PSA SERPL-MCNC: 4.5 NG/ML (ref 0–4)

## 2020-10-28 ENCOUNTER — NURSE TRIAGE (OUTPATIENT)
Dept: NURSING | Facility: CLINIC | Age: 70
End: 2020-10-28

## 2020-10-28 ENCOUNTER — VIRTUAL VISIT (OUTPATIENT)
Dept: FAMILY MEDICINE | Facility: CLINIC | Age: 70
End: 2020-10-28
Payer: COMMERCIAL

## 2020-10-28 DIAGNOSIS — R05.9 COUGH: Primary | ICD-10-CM

## 2020-10-28 DIAGNOSIS — N18.31 STAGE 3A CHRONIC KIDNEY DISEASE (H): ICD-10-CM

## 2020-10-28 DIAGNOSIS — Z12.11 SPECIAL SCREENING FOR MALIGNANT NEOPLASMS, COLON: ICD-10-CM

## 2020-10-28 PROBLEM — N18.30 CHRONIC KIDNEY DISEASE, STAGE 3 (H): Status: ACTIVE | Noted: 2020-10-28

## 2020-10-28 PROCEDURE — 99214 OFFICE O/P EST MOD 30 MIN: CPT | Mod: GT | Performed by: FAMILY MEDICINE

## 2020-10-28 RX ORDER — BENZONATATE 200 MG/1
200 CAPSULE ORAL 3 TIMES DAILY PRN
Qty: 30 CAPSULE | Refills: 0 | Status: SHIPPED | OUTPATIENT
Start: 2020-10-28 | End: 2021-03-01

## 2020-10-28 NOTE — PATIENT INSTRUCTIONS
"    Tessalon for cough    Get Covid testing and quarantine until status is known      Discharge Instructions for COVID-19 Patients  You have--or may have--COVID-19. Please follow the instructions listed below.   If you have a weakened immune system, discuss with your doctor any other actions you need to take.  How can I protect others?  If you have symptoms (fever, cough, body aches or trouble breathing):    Stay home and away from others (self-isolate) until:  ? At least 10 days have passed since your symptoms started, And   ? You've had no fever--and no medicine that reduces fever--for 1 full day (24 hours), And    ? Your other symptoms have resolved (gotten better).  If you don't show symptoms, but testing showed that you have COVID-19:    Stay home and away from others (self-isolate). Follow the tips under \"How do I self-isolate?\" below for 10 days (20 days if you have a weak immune system).    You don't need to be retested for COVID-19 before going back to school or work. As long as you're fever-free and feeling better, you can go back to school, work and other activities after waiting the 10 or 20 days.   How do I self-isolate?    Stay in your own room, even for meals. Use your own bathroom if you can.    Stay away from others in your home. No hugging, kissing or shaking hands. No visitors.    Don't go to work, school or anywhere else.    Clean \"high touch\" surfaces often (doorknobs, counters, handles). Use household cleaning spray or wipes. You'll find a full list of  on the EPA website: www.epa.gov/pesticide-registration/list-n-disinfectants-use-against-sars-cov-2.    Cover your mouth and nose with a mask or other face covering to avoid spreading germs.    Wash your hands and face often. Use soap and water.    Caregivers in these groups are at risk for severe illness due to COVID-19:  ? People 65 years and older  ? People who live in a nursing home or long-term care facility  ? People with chronic " disease (lung, heart, cancer, diabetes, kidney, liver, immunologic)  ? People who have a weakened immune system, including those who:    Are in cancer treatment    Take medicine that weakens the immune system, such as corticosteroids    Had a bone marrow or organ transplant    Have an immune deficiency    Have poorly controlled HIV or AIDS    Are obese (body mass index of 40 or higher)    Smoke regularly    Caregivers should wear gloves while washing dishes, handling laundry and cleaning bedrooms and bathrooms.    Use caution when washing and drying laundry: Don't shake dirty laundry and use the warmest water setting that you can.    For more tips on managing your health at home, go to www.cdc.gov/coronavirus/2019-ncov/downloads/10Things.pdf.  How can I take care of myself at home?  1. Get lots of rest. Drink extra fluids (unless a doctor has told you not to).    2. Take Tylenol (acetaminophen) for fever or pain. If you have liver or kidney problems, ask your family doctor if it's okay to take Tylenol.     Adults can take either:  ? 650 mg (two 325 mg pills) every 4 to 6 hours, or   ? 1,000 mg (two 500 mg pills) every 8 hours as needed.  ? Note: Don't take more than 3,000 mg in one day. Acetaminophen is found in many medicines (both prescribed and over-the-counter medicines). Read all labels to be sure you don't take too much.   For children, check the Tylenol bottle for the right dose. The dose is based on the child's age or weight.  3. If you have other health problems (like cancer, heart failure, an organ transplant or severe kidney disease): Call your specialty clinic if you don't feel better in the next 2 days.    4. Know when to call 911. Emergency warning signs include:  ? Trouble breathing or shortness of breath  ? Pain or pressure in the chest that doesn't go away  ? Feeling confused like you haven't felt before, or not being able to wake up  ? Bluish-colored lips or face    5. Your doctor may have  prescribed a blood thinner medicine. Follow their instructions.  Where can I get more information?    [facility] - About COVID-19: [email or web address]    CDC - What to Do If You're Sick: www.cdc.gov/coronavirus/2019-ncov/about/steps-when-sick.html    CDC - Ending Home Isolation: www.cdc.gov/coronavirus/2019-ncov/hcp/disposition-in-home-patients.html    CDC - Caring for Someone: www.cdc.gov/coronavirus/2019-ncov/if-you-are-sick/care-for-someone.html    [facility] - Interim Guidance for Hospital Discharge to Home: www.health.[facility].[place]/diseases/coronavirus/hcp/hospdischarge.pdf    [facility] clinical trials (COVID-19 research studies): clinicalaffairs.[facility]-clinical-trials    Below are the COVID-19 hotlines at the [place] Department of Health ([facility]). Interpreters are available.  ? For health questions: Call 539-766-8166 or 1-549.269.6236 (7 a.m. to 7 p.m.)  ? For questions about schools and childcare: Call 216-866-1778 or 1-683.172.2079 (7 a.m. to 7 p.m.)    For informational purposes only. Not to replace the advice of your health care provider. Clinically reviewed by the Infection Prevention Team. Copyright   2020 [employer] [facility]. All rights reserved. [employer] 923546 - REV 08/04/20.

## 2020-10-28 NOTE — PROGRESS NOTES
"Erick Yusuf is a 69 year old male who is being evaluated via a billable video visit.      The patient has been notified of following:     \"This video visit will be conducted via a call between you and your physician/provider. We have found that certain health care needs can be provided without the need for an in-person physical exam.  This service lets us provide the care you need with a video conversation.  If a prescription is necessary we can send it directly to your pharmacy.  If lab work is needed we can place an order for that and you can then stop by our lab to have the test done at a later time.    Video visits are billed at different rates depending on your insurance coverage.  Please reach out to your insurance provider with any questions.    If during the course of the call the physician/provider feels a video visit is not appropriate, you will not be charged for this service.\"    Patient has given verbal consent for Video visit? Yes  How would you like to obtain your AVS? MyChart  If you are dropped from the video visit, the video invite should be resent to: Send to e-mail at: [email or web address]  Will anyone else be joining your video visit? No      Subjective     Erick Yusuf is a 69 year old male who presents today via video visit for the following health issues:    HPI    SUBJECTIVE:  Erick Yusuf is a 69 year old male who presents with the following concerns;              Symptoms: cc Present Absent Comment   Fever/Chills   x    Fatigue  x  Worn out by night    Muscle Aches   x    Eye Irritation  x     Sneezing  x     Nasal John/Drg  x     Sinus Pressure/Pain   x    Loss of smell  x  History of lack of smell   Dental pain   x    Sore Throat  x  Short lasting.  1 hour only during middle of night    Swollen Glands   x    Ear Pain/Fullness   x    Cough  x  Does have a history of medications that cause a cough.      Wheeze  x     Chest Pain   x    Shortness of breath   x "    Rash   x    Other   x      Symptom duration:  2 days   Sympom severity:  moderate    Treatments tried:  Cough drops, OTC cough medications, increased sleep    Contacts:  None.   Recent travel to [place]- 1 week ago        Medications updated and reviewed.  Past, family and surgical history is updated and reviewed in the record.        Labs reviewed in EPIC  BP Readings from Last 3 Encounters:   09/15/20 (!) 152/100   08/24/20 132/80   07/22/20 134/88    Wt Readings from Last 3 Encounters:   08/24/20 93.4 kg (206 lb)   07/22/20 94.3 kg (208 lb)   07/11/19 90.7 kg (200 lb)                  Patient Active Problem List   Diagnosis     HYPERLIPIDEMIA LDL GOAL <130     Hypertension goal BP (blood pressure) < 140/90     Advanced directives, counseling/discussion     Benign non-nodular prostatic hyperplasia without lower urinary tract symptoms     Impacted cerumen of left ear     Chronic kidney disease, stage 3     Past Surgical History:   Procedure Laterality Date     NO HISTORY OF SURGERY         Social History     Tobacco Use     Smoking status: Never Smoker     Smokeless tobacco: Never Used   Substance Use Topics     Alcohol use: Yes     Comment: occasionally     Family History   Problem Relation Age of Onset     Diabetes Father      Heart Disease Brother          Current Outpatient Medications   Medication Sig Dispense Refill     amLODIPine (NORVASC) 5 MG tablet Take 1 tablet (5 mg) by mouth daily 90 tablet 1     benzonatate (TESSALON) 200 MG capsule Take 1 capsule (200 mg) by mouth 3 times daily as needed for cough 30 capsule 0     finasteride (PROSCAR) 5 MG tablet Take 1 tablet (5 mg) by mouth daily 90 tablet 3     hydrochlorothiazide (HYDRODIURIL) 25 MG tablet Take 1 tablet (25 mg) by mouth daily 90 tablet 1     multivitamin, therapeutic with minerals (MULTI-VITAMIN) TABS Take 1 tablet by mouth daily.       simvastatin (ZOCOR) 20 MG tablet Take 1 tablet (20 mg) by mouth At Bedtime 90 tablet 1     tamsulosin  (FLOMAX) 0.4 MG capsule Take 2 capsules (0.8 mg) by mouth daily 180 capsule 3           Video Start Time: 10:11 AM        Review of Systems   Constitutional, HEENT, cardiovascular, pulmonary, gi and gu systems are negative, except as otherwise noted.      Objective           Vitals:  No vitals were obtained today due to virtual visit.    Physical Exam     GENERAL: Healthy, alert and no distress  EYES: Eyes grossly normal to inspection.  No discharge or erythema, or obvious scleral/conjunctival abnormalities.  RESP: No audible wheeze, or visible cyanosis. + frequent cough No visible retractions or increased work of breathing.    SKIN: Visible skin clear. No significant rash, abnormal pigmentation or lesions.  NEURO: Cranial nerves grossly intact.  Mentation and speech appropriate for age.  PSYCH: Mentation appears normal, affect normal/bright, judgement and insight intact, normal speech and appearance well-groomed.              Assessment & Plan     (R05) Cough  (primary encounter diagnosis)  Comment: has been traveling, no fever, + fatigue and new cough  Plan: Symptomatic COVID-19 Virus (Coronavirus) by         PCR, benzonatate (TESSALON) 200 MG capsule        Test for covid,  Treat cough with tessalon.  Reviewed isolation/quarantine instructions - see AVS    (N18.31) Stage 3a chronic kidney disease  Comment: noted on labs for past 2 years  Plan: monitor for change and medications     (Z12.11) Special screening for malignant neoplasms, colon  Comment: due  Plan: pt would like cologuard, will completed and submit forms             See Patient Instructions    Return in about 1 week (around 11/4/2020) for if not improved or symptoms worsen.    Sandra Mcgill MD  [facility]      Video-Visit Details    Type of service:  Video Visit    Video End Time:10:22 AM    Originating Location (pt. Location): Home    Distant Location (provider location):  [facility]     Platform used  for Video Visit: Renu

## 2020-10-28 NOTE — TELEPHONE ENCOUNTER
Since Monday he has had some nasal congestion, sore throat, sneezing, and Now has a barky cough    No fever 97.6  No difficulty breathing  No chest pain   No chills   No diarrhea  No body aches  No headache  No loss of taste or smell    They would like to get him seen, they are thinking a possible sinus infection.     Rule out sinus infection/other illness vs covid    Triaged to disposition of Call PCP within 24 hours. Advised virtual visit with [facility] or virtual visit with the clinic. Patient chose virtual visit with clinic. Call transferred to scheduling.     COVID 19 Nurse Triage Plan/Patient Instructions    Please be aware that novel coronavirus (COVID-19) may be circulating in the community. If you develop symptoms such as fever, cough, or SOB or if you have concerns about the presence of another infection including coronavirus (COVID-19), please contact your health care provider or visit [email or web address].     Disposition/Instructions    Virtual Visit with provider recommended. Reference Visit Selection Guide.    Thank you for taking steps to prevent the spread of this virus.  o Limit your contact with others.  o Wear a simple mask to cover your cough.  o Wash your hands well and often.    Resources    [facility]: About COVID-19: [email or web address]    CDC: What to Do If You're Sick: www.cdc.gov/coronavirus/2019-ncov/about/steps-when-sick.html    CDC: Ending Home Isolation: www.cdc.gov/coronavirus/2019-ncov/hcp/disposition-in-home-patients.html     CDC: Caring for Someone: www.cdc.gov/coronavirus/2019-ncov/if-you-are-sick/care-for-someone.html     [facility]: Interim Guidance for Hospital Discharge to Home: www.health.[facility].[place]/diseases/coronavirus/hcp/hospdischarge.pdf    [facility] clinical trials (COVID-19 research studies): clinicalaffairs.[facility]/umn-clinical-trials     Below are the COVID-19 hotlines at the [place] Department of Health ([facility]). Interpreters are available.    o For health questions: Call 912-391-6450 or 1-486.484.8221 (7 a.m. to 7 p.m.)  o For questions about schools and childcare: Call 207-485-2475 or 1-181.796.3157 (7 a.m. to 7 p.m.)      Reason for Disposition    [1] COVID-19 infection suspected by caller or triager AND [2] mild symptoms (cough, fever, or others) AND [3] no complications or SOB    Additional Information    Negative: SEVERE difficulty breathing (e.g., struggling for each breath, speaks in single words)    Negative: Difficult to awaken or acting confused (e.g., disoriented, slurred speech)    Negative: Bluish (or gray) lips or face now    Negative: Shock suspected (e.g., cold/pale/clammy skin, too weak to stand, low BP, rapid pulse)    Negative: Sounds like a life-threatening emergency to the triager    Negative: [1] COVID-19 exposure AND [2] no symptoms    Negative: COVID-19 and Breastfeeding, questions about    Negative: [1] Adult with possible COVID-19 symptoms AND [2] triager concerned about severity of symptoms or other causes    Negative: SEVERE or constant chest pain or pressure (Exception: mild central chest pain, present only when coughing)    Negative: MODERATE difficulty breathing (e.g., speaks in phrases, SOB even at rest, pulse 100-120)    Negative: Patient sounds very sick or weak to the triager    Negative: MILD difficulty breathing (e.g., minimal/no SOB at rest, SOB with walking, pulse <100)    Negative: Chest pain or pressure    Negative: Fever > 103 F (39.4 C)    Negative: [1] Fever > 101 F (38.3 C) AND [2] age > 60    Negative: [1] Fever > 100.0 F (37.8 C) AND [2] bedridden (e.g., nursing home patient, CVA, chronic illness, recovering from surgery)    Negative: HIGH RISK patient (e.g., age > 64 years, diabetes, heart or lung disease, weak immune system) (Exception: Has already been evaluated by healthcare provider and has no new or worsening symptoms)    Negative: Fever present > 3 days (72 hours)    Negative: [1] Fever returns  after gone for over 24 hours AND [2] symptoms worse or not improved    Negative: [1] Continuous (nonstop) coughing interferes with work or school AND [2] no improvement using cough treatment per protocol    Protocols used: CORONAVIRUS (COVID-19) DIAGNOSED OR [id number] 8.4.20    Caitie Nielsen RN on 10/28/2020 at 6:54 AM

## 2020-10-29 DIAGNOSIS — R05.9 COUGH: ICD-10-CM

## 2020-10-29 PROCEDURE — U0003 INFECTIOUS AGENT DETECTION BY NUCLEIC ACID (DNA OR RNA); SEVERE ACUTE RESPIRATORY SYNDROME CORONAVIRUS 2 (SARS-COV-2) (CORONAVIRUS DISEASE [COVID-19]), AMPLIFIED PROBE TECHNIQUE, MAKING USE OF HIGH THROUGHPUT TECHNOLOGIES AS DESCRIBED BY CMS-2020-01-R: HCPCS | Performed by: FAMILY MEDICINE

## 2020-10-30 LAB
SARS-COV-2 RNA SPEC QL NAA+PROBE: ABNORMAL
SPECIMEN SOURCE: ABNORMAL

## 2020-10-31 ENCOUNTER — TELEPHONE (OUTPATIENT)
Dept: LAB | Facility: CLINIC | Age: 70
End: 2020-10-31

## 2020-10-31 RX ORDER — BENZONATATE 200 MG/1
200 CAPSULE ORAL 3 TIMES DAILY PRN
Qty: 30 CAPSULE | Refills: 0 | Status: SHIPPED | OUTPATIENT
Start: 2020-10-31 | End: 2021-03-01

## 2020-10-31 NOTE — TELEPHONE ENCOUNTER
"Coronavirus (COVID-19) Notification    Caller Name (Patient, parent, daughter/son, grandparent, etc)  Derrick    Reason for call  Notify of Positive Coronavirus (COVID-19) lab results, assess symptoms,  review [facility] recommendations    Lab Result    Lab test:  2019-nCoV rRt-PCR or SARS-CoV-2 PCR    Oropharyngeal AND/OR nasopharyngeal swabs is POSITIVE for 2019-nCoV RNA/SARS-COV-2 PCR (COVID-19 virus)    RN Recommendations/Instructions per [facility] Coronavirus COVID-19 recommendations    Brief introduction script  Introduce self then review script:  \"I am calling on behalf of [facility].  We were notified that your Coronavirus test (COVID-19) for was POSITIVE for the virus.  I have some information to relay to you but first I wanted to mention that the [place] Dept of Health will be contacting you shortly [it's possible MD already called Patient] to talk to you more about how you are feeling and other people you have had contact with who might now also have the virus.  Also, [facility] is Partnering with the [facility] for Covid-19 research, you may be contacted directly by research staff.\"    Assessment (Inquire about Patient's current symptoms)   Assessment   Current Symptoms at time of phone call: (if no symptoms, document No symptoms] Coughing with improvement in symptoms   Symptoms onset (if applicable) 9/27     If at time of call, Patients symptoms hare worsened, the Patient should contact 911 or have someone drive them to Emergency Dept promptly:      If Patient calling 911, inform 911 personal that you have tested positive for the Coronavirus (COVID-19).  Place mask on and await 911 to arrive.    If Emergency Dept, If possible, please have another adult drive you to the Emergency Dept but you need to wear mask when in contact with other people.      Review information with Patient    Your result was positive. This means you have COVID-19 (coronavirus).  We have sent you a " letter that reviews the information that I'll be reviewing with you now.    How can I protect others?    If you have symptoms: stay home and away from others (self-isolate) until:    You've had no fever--and no medicine that reduces fever--for 1 full day (24 hours). And       Your other symptoms have gotten better. For example, your cough or breathing has improved. And     At least 10 days have passed since your symptoms started. (If you've been told by a doctor that you have a weak immune system, wait 20 days.)     If you don't have symptoms: Stay home and away from others (self-isolate) until at least 10 days have passed since your first positive COVID-19 test. (Date test collected)    During this time:    Stay in your own room, including for meals. Use your own bathroom if you can.    Stay away from others in your home. No hugging, kissing or shaking hands. No visitors.     Don't go to work, school or anywhere else.     Clean  high touch  surfaces often (doorknobs, counters, handles, etc.). Use a household cleaning spray or wipes. You'll find a full list on the EPA website at www.epa.gov/pesticide-registration/list-n-disinfectants-use-against-sars-cov-2.     Cover your mouth and nose with a mask, tissue or other face covering to avoid spreading germs.    Wash your hands and face often with soap and water.    Caregivers in these groups are at risk for severe illness due to COVID-19:  o People 65 years and older  o People who live in a nursing home or long-term care facility  o People with chronic disease (lung, heart, cancer, diabetes, kidney, liver, immunologic)  o People who have a weakened immune system, including those who:  - Are in cancer treatment  - Take medicine that weakens the immune system, such as corticosteroids  - Had a bone marrow or organ transplant  - Have an immune deficiency  - Have poorly controlled HIV or AIDS  - Are obese (body mass index of 40 or higher)  - Smoke regularly    Caregivers  should wear gloves while washing dishes, handling laundry and cleaning bedrooms and bathrooms.    Wash and dry laundry with special caution. Don't shake dirty laundry, and use the warmest water setting you can.    If you have a weakened immune system, ask your doctor about other actions you should take.    For more tips, go to www.cdc.gov/coronavirus/2019-ncov/downloads/10Things.pdf.    You should not go back to work until you meet the guidelines above for ending your home isolation. You don't need to be retested for COVID-19 before going back to work--studies show that you won't spread the virus if it's been at least 10 days since your symptoms started (or 20 days, if you have a weak immune system).    Employers: This document serves as formal notice of your employee's medical guidelines for going back to work. They must meet the above guidelines before going back to work in person.    How can I take care of myself?    1. Get lots of rest. Drink extra fluids (unless a doctor has told you not to).    2. Take Tylenol (acetaminophen) for fever or pain. If you have liver or kidney problems, ask your family doctor if it's okay to take Tylenol.     Take either:     650 mg (two 325 mg pills) every 4 to 6 hours, or     1,000 mg (two 500 mg pills) every 8 hours as needed.     Note: Don't take more than 3,000 mg in one day. Acetaminophen is found in many medicines (both prescribed and over-the-counter medicines). Read all labels to be sure you don't take too much.    For children, check the Tylenol bottle for the right dose (based on their age or weight).    3. If you have other health problems (like cancer, heart failure, an organ transplant or severe kidney disease): Call your specialty clinic if you don't feel better in the next 2 days.    4. Know when to call 911: Emergency warning signs include:    Trouble breathing or shortness of breath    Pain or pressure in the chest that doesn't go away    Feeling confused like you  haven't felt before, or not being able to wake up    Bluish-colored lips or face    5. Sign up for [employer]. We know it's scary to hear that you have COVID-19. We want to track your symptoms to make sure you're okay over the next 2 weeks. Please look for an email from [employer]--this is a free, online program that we'll use to keep in touch. To sign up, follow the link in the email. Learn more at [email or web address].    Where can I get more information?    [facility]: [email or web address]    Coronavirus Basics: www.health.[facility].mn./diseases/coronavirus/basics.html    What to Do If You're Sick: www.cdc.gov/coronavirus/2019-ncov/about/steps-when-sick.html    Ending Home Isolation: www.cdc.gov/coronavirus/2019-ncov/hcp/disposition-in-home-patients.html     Caring for Someone with COVID-19: www.cdc.gov/coronavirus/2019-ncov/if-you-are-sick/care-for-someone.html     [facility] clinical trials (COVID-19 research studies): clinicalaffairs.[facility]-clinical-trials     A Positive COVID-19 letter will be sent via [employer] or the mail. (Exception, no letters sent to Presurgerical/Preprocedure Patients)    Tiffany Sanchez, MSN, RN

## 2020-11-02 ENCOUNTER — TELEPHONE (OUTPATIENT)
Dept: FAMILY MEDICINE | Facility: CLINIC | Age: 70
End: 2020-11-02

## 2020-11-02 NOTE — TELEPHONE ENCOUNTER
Left message on answering machine for patient/parent to call back.   [phone].  Silvia Lerma RN

## 2020-11-03 ENCOUNTER — TELEPHONE (OUTPATIENT)
Dept: NURSING | Facility: CLINIC | Age: 70
End: 2020-11-03
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
## 2020-11-03 NOTE — TELEPHONE ENCOUNTER
Consent to Communicate with Kaley Hampton dated 9/17/15   Wife notified of provider's message as written below. Wife verbalized good understanding.  She was asking again about the questions that were previosly asked and she/Derrick were given advice on.  She was advised to call the [facility]ishBoard hotline or go to CDC.gov as previously advised if they had further questions. She had no further questions and needed no further support. Colleen Bagley R.N.

## 2020-11-03 NOTE — TELEPHONE ENCOUNTER
"Patient reports, received cough suppressant, Covid 19 cough just won't stop.  Patient is looking for cough medication 'that will work\". \"I feel good, but I cough a lot\".  Diagnosed with Covid 19, 10/29/20.  Was in quarantine as of 10/23/20  Patient is working from home.  Patient wants a cough suppressant that will not \"make me feel out of it either\".  Tessalon is not effective.   Please advise  Silvia Lerma RN     "

## 2020-11-03 NOTE — TELEPHONE ENCOUNTER
Wife calling. She is wondering if a course of antibiotics (Erythromycin) might be helpful for Derrick to get over his Covid faster. Advised that Covid is a virus, so antibiotics are not indicated. Wife requests that I send a message to ask anyway.  Cristina Jacobs RN  [facility]

## 2020-11-03 NOTE — TELEPHONE ENCOUNTER
I do not see erythromycin on his medication list now or in the past.  I have not written a prescription for erythromycin for many years.  I agree with Dr Mcgill.  If the cough gets worse or hangs on he could do a e visit

## 2020-11-03 NOTE — TELEPHONE ENCOUNTER
Information below is reviewed with  Dr Sandra Mcgill, Verbal Orders,   Can offer Robitussin with Codeine, however can be sedating.  Can try Mucinex DM with Tessalon to see if that is effective.   Patient has been quarantining for 2 weeks,however per positive test which was 10/29/20 needs to wait for 10 days from positive result to return to work.   Discussed per CDC instructions, after 10 days after positive results and fever free can return to work.    Discussed things to improve cough.  Push fluids, warm fluids.  Water with lemon or honey.  humidifier/vaporizer, for moisture which will help thin secretions, be certain to clean per manufacturers instructions.   Sitting in a steamy bathroom.      Patient will call back if opts for Robitussin with codeine.     Verbalized good understanding.     Per protocol, will route encounter to be cosigned by provider for Verbal Orders.  Silvia Lerma RN

## 2020-11-16 ENCOUNTER — OFFICE VISIT (OUTPATIENT)
Dept: URGENT CARE | Facility: URGENT CARE | Age: 70
End: 2020-11-16
Payer: COMMERCIAL

## 2020-11-16 VITALS
HEART RATE: 88 BPM | SYSTOLIC BLOOD PRESSURE: 132 MMHG | TEMPERATURE: 97.4 F | OXYGEN SATURATION: 96 % | DIASTOLIC BLOOD PRESSURE: 72 MMHG

## 2020-11-16 DIAGNOSIS — J01.10 ACUTE NON-RECURRENT FRONTAL SINUSITIS: Primary | ICD-10-CM

## 2020-11-16 PROCEDURE — 99213 OFFICE O/P EST LOW 20 MIN: CPT | Performed by: NURSE PRACTITIONER

## 2020-11-17 NOTE — PROGRESS NOTES
SUBJECTIVE:  Erick Yusuf is a 70 year old male here with concerns about sinus infection.  He states onset of symptoms were 2 weeks ago.  He has had frontal pressure. Course of illness is worsening. Severity moderate  Current and Associated symptoms: nasal congestion, rhinorrhea, facial pain/pressure, headache and post-nasal drainage  Predisposing factors include none. Recent treatment has included: OTC meds  Had covid 10/29. Denies fever cough sob chest pain today    Past Medical History:   Diagnosis Date     HTN (hypertension)      Hypercholesteremia      Leucopenia      Social History     Tobacco Use     Smoking status: Never Smoker     Smokeless tobacco: Never Used   Substance Use Topics     Alcohol use: Yes     Comment: occasionally       ROS:  Review of systems negative except as stated above.    OBJECTIVE:  /72   Pulse 88   Temp 97.4  F (36.3  C) (Tympanic)   SpO2 96%   Exam:GENERAL APPEARANCE: alert and no distress  EYES: EOMI,  PERRL, conjunctiva clear  HENT: ear canals and TM's normal. mouth without ulcers, erythema or lesions. Nose thick colored rhinorrhea frontal pressure sinus tenderness  NECK: supple, nontender, no lymphadenopathy  RESP: lungs clear to auscultation - no rales, rhonchi or wheezes  CV: regular rates and rhythm, normal S1 S2, no murmur noted  SKIN: no suspicious lesions or rashes    ASSESSMENT:  Sinusitis    PLAN:  Augmentin BID X 10 days  Fluids rest otc medications as discussed, patient education given.  Follow up with primary clinic if not improving    JACKIE Callahan CNP

## 2020-11-30 ENCOUNTER — TELEPHONE (OUTPATIENT)
Dept: FAMILY MEDICINE | Facility: CLINIC | Age: 70
End: 2020-11-30

## 2020-11-30 DIAGNOSIS — J01.10 ACUTE NON-RECURRENT FRONTAL SINUSITIS: ICD-10-CM

## 2020-11-30 NOTE — TELEPHONE ENCOUNTER

## 2020-11-30 NOTE — TELEPHONE ENCOUNTER
Wife calling. The patient was seen on 11- for a sinus infection and given amoxicillin. He was taking 1 per day and not 2. He still has the sinus infection. Please call and advise.

## 2020-11-30 NOTE — TELEPHONE ENCOUNTER
Left message on voicemail letting patient know Dr. Sancho Rushing has sent a refill on his antibiotic to his pharmacy.  If no better after that then will need to be seen.  Laila BROOKS, [phone]

## 2020-12-26 ENCOUNTER — TRANSFERRED RECORDS (OUTPATIENT)
Dept: HEALTH INFORMATION MANAGEMENT | Facility: CLINIC | Age: 70
End: 2020-12-26

## 2020-12-26 LAB — COLOGUARD-ABSTRACT: NEGATIVE

## 2020-12-29 ENCOUNTER — TELEPHONE (OUTPATIENT)
Dept: FAMILY MEDICINE | Facility: CLINIC | Age: 70
End: 2020-12-29

## 2020-12-29 NOTE — LETTER
December 29, 2020      Erick Yusuf  [address]  [place] [address]      Dear Erick,    The result of your recent Cologuard testing was negative. A negative result means that Cologuard did not detect significant levels of DNA and/or hemoglobin biomarkers in the stool which are associated with colon cancer or precancer.    Thank you for completing your screening, your next screening should be completed in 3 years.      If you have any questions or concerns, please contact your care team at [phone].     Sincerely,  [facility]

## 2020-12-29 NOTE — TELEPHONE ENCOUNTER
You ordered this Cologuard DRB pt, Negative Cologuard results received, Placed in providers basket.  Letter sent to patient, faxed to stat scanning.  ARIEL Alvares

## 2021-02-10 DIAGNOSIS — E78.00 HYPERCHOLESTEREMIA: ICD-10-CM

## 2021-02-10 DIAGNOSIS — I10 HYPERTENSION GOAL BP (BLOOD PRESSURE) < 140/90: ICD-10-CM

## 2021-02-10 DIAGNOSIS — N40.0 BENIGN NON-NODULAR PROSTATIC HYPERPLASIA WITHOUT LOWER URINARY TRACT SYMPTOMS: ICD-10-CM

## 2021-02-10 DIAGNOSIS — I10 ESSENTIAL HYPERTENSION: ICD-10-CM

## 2021-02-10 RX ORDER — AMLODIPINE BESYLATE 5 MG/1
5 TABLET ORAL DAILY
Qty: 90 TABLET | Refills: 1 | Status: SHIPPED | OUTPATIENT
Start: 2021-02-10 | End: 2021-07-23

## 2021-02-10 RX ORDER — HYDROCHLOROTHIAZIDE 25 MG/1
25 TABLET ORAL DAILY
Qty: 90 TABLET | Refills: 1 | Status: SHIPPED | OUTPATIENT
Start: 2021-02-10 | End: 2021-07-23

## 2021-02-10 RX ORDER — SIMVASTATIN 20 MG
20 TABLET ORAL AT BEDTIME
Qty: 90 TABLET | Refills: 1 | Status: SHIPPED | OUTPATIENT
Start: 2021-02-10 | End: 2021-07-23

## 2021-02-10 RX ORDER — FINASTERIDE 5 MG/1
5 TABLET, FILM COATED ORAL DAILY
Qty: 90 TABLET | Refills: 1 | Status: SHIPPED | OUTPATIENT
Start: 2021-02-10 | End: 2021-07-23

## 2021-03-01 ENCOUNTER — NURSE TRIAGE (OUTPATIENT)
Dept: NURSING | Facility: CLINIC | Age: 71
End: 2021-03-01

## 2021-03-01 ENCOUNTER — OFFICE VISIT (OUTPATIENT)
Dept: FAMILY MEDICINE | Facility: CLINIC | Age: 71
End: 2021-03-01
Payer: COMMERCIAL

## 2021-03-01 VITALS
HEART RATE: 79 BPM | RESPIRATION RATE: 22 BRPM | BODY MASS INDEX: 29.01 KG/M2 | TEMPERATURE: 98.2 F | DIASTOLIC BLOOD PRESSURE: 82 MMHG | WEIGHT: 208 LBS | SYSTOLIC BLOOD PRESSURE: 152 MMHG | OXYGEN SATURATION: 98 %

## 2021-03-01 DIAGNOSIS — I10 HYPERTENSION GOAL BP (BLOOD PRESSURE) < 140/90: Primary | ICD-10-CM

## 2021-03-01 PROBLEM — N18.30 CHRONIC KIDNEY DISEASE, STAGE 3 (H): Status: RESOLVED | Noted: 2020-10-28 | Resolved: 2021-03-01

## 2021-03-01 LAB
ALBUMIN SERPL-MCNC: 4.4 G/DL (ref 3.4–5)
ALP SERPL-CCNC: 96 U/L (ref 40–150)
ALT SERPL W P-5'-P-CCNC: 26 U/L (ref 0–70)
ANION GAP SERPL CALCULATED.3IONS-SCNC: 3 MMOL/L (ref 3–14)
AST SERPL W P-5'-P-CCNC: 17 U/L (ref 0–45)
BILIRUB SERPL-MCNC: 0.9 MG/DL (ref 0.2–1.3)
BUN SERPL-MCNC: 17 MG/DL (ref 7–30)
CALCIUM SERPL-MCNC: 9.6 MG/DL (ref 8.5–10.1)
CHLORIDE SERPL-SCNC: 105 MMOL/L (ref 94–109)
CHOLEST SERPL-MCNC: 188 MG/DL
CO2 SERPL-SCNC: 31 MMOL/L (ref 20–32)
CREAT SERPL-MCNC: 1.1 MG/DL (ref 0.66–1.25)
GFR SERPL CREATININE-BSD FRML MDRD: 68 ML/MIN/{1.73_M2}
GLUCOSE SERPL-MCNC: 126 MG/DL (ref 70–99)
HDLC SERPL-MCNC: 66 MG/DL
LDLC SERPL CALC-MCNC: 109 MG/DL
NONHDLC SERPL-MCNC: 122 MG/DL
POTASSIUM SERPL-SCNC: 3.7 MMOL/L (ref 3.4–5.3)
PROT SERPL-MCNC: 7.4 G/DL (ref 6.8–8.8)
SODIUM SERPL-SCNC: 139 MMOL/L (ref 133–144)
TRIGL SERPL-MCNC: 63 MG/DL

## 2021-03-01 PROCEDURE — 80061 LIPID PANEL: CPT | Performed by: FAMILY MEDICINE

## 2021-03-01 PROCEDURE — 99213 OFFICE O/P EST LOW 20 MIN: CPT | Performed by: FAMILY MEDICINE

## 2021-03-01 PROCEDURE — 36415 COLL VENOUS BLD VENIPUNCTURE: CPT | Performed by: FAMILY MEDICINE

## 2021-03-01 PROCEDURE — 80053 COMPREHEN METABOLIC PANEL: CPT | Performed by: FAMILY MEDICINE

## 2021-03-01 NOTE — PATIENT INSTRUCTIONS
Continue current medications each morning.    Avoid supplement.    If symptoms recur, report immediately to ED      [facility] for vaccines for 75+ and Healthcare Workers:  (Roughly [id number] spots/day) -            [facility]  .         [place]     Appointments ARE NEEDED, NO WALK-INS!     There is another way to schedule, but MyChart is more efficient.      The direct phone # for vaccine scheduling is: [phone]    Go to [email or web address] to register for the wait list.        [place] Department of Health information for Covid vaccine:   Phone:  [phone], or toll free, [phone]  Online Registration: [email or web address] then clinic on Find My Vaccine.

## 2021-03-01 NOTE — TELEPHONE ENCOUNTER
Wife is calling and states patient is complaining of elevated blood pressure of 251/116. Caller states patient is dizzy, weak and had a clear emesis. Caller denies any other symptoms or fever. Triage guidelines recommend to see pcp within 4 hours. Triager called out to answering service, spoke with on call provider Dr. Viveros, he states patient should be seen in the ED. Triager called out to wife Kaley and updated regarding providers' advice. Caller verbalized and understands directives.  COVID 19 Nurse Triage Plan/Patient Instructions    Please be aware that novel coronavirus (COVID-19) may be circulating in the community. If you develop symptoms such as fever, cough, or SOB or if you have concerns about the presence of another infection including coronavirus (COVID-19), please contact your health care provider or visit [email or web address].     Disposition/Instructions    ED Visit recommended. Follow protocol based instructions.     Bring Your Own Device:  Please also bring your smart device(s) (smart phones, tablets, laptops) and their charging cables for your personal use and to communicate with your care team during your visit.    Thank you for taking steps to prevent the spread of this virus.  o Limit your contact with others.  o Wear a simple mask to cover your cough.  o Wash your hands well and often.    Resources    [facility]: About COVID-19: [email or web address]    CDC: What to Do If You're Sick: www.cdc.gov/coronavirus/2019-ncov/about/steps-when-sick.html    CDC: Ending Home Isolation: www.cdc.gov/coronavirus/2019-ncov/hcp/disposition-in-home-patients.html     CDC: Caring for Someone: www.cdc.gov/coronavirus/2019-ncov/if-you-are-sick/care-for-someone.html     [facility]: Interim Guidance for Hospital Discharge to Home: www.health.[facility].[place]/diseases/coronavirus/hcp/hospdischarge.pdf    [facility] clinical trials (COVID-19 research studies):  clinicalaffairs.[employer].[facility]/[employer]-clinical-trials     Below are the COVID-19 hotlines at the [place] Department of Health ([facility]). Interpreters are available.   o For health questions: Call 804-147-9130 or 1-896.141.9164 (7 a.m. to 7 p.m.)  o For questions about schools and childcare: Call 814-526-1173 or 1-566.349.9555 (7 a.m. to 7 p.m.)                     Additional Information    Negative: Difficult to awaken or acting confused (e.g., disoriented, slurred speech)    Negative: Severe difficulty breathing (e.g., struggling for each breath, speaks in single words)    Negative: [1] Weakness of the face, arm or leg on one side of the body AND [2] new onset    Negative: [1] Numbness (i.e., loss of sensation) of the face, arm or leg on one side of the body AND [2] new onset    Negative: [1] Chest pain lasts > 5 minutes AND [2] history of heart disease  (i.e., heart attack, bypass surgery, angina, angioplasty, CHF)    Negative: [1] Chest pain AND [2] took nitrogylcerin AND [3] pain was not relieved    Negative: Sounds like a life-threatening emergency to the triager    Negative: Symptom is main concern  (e.g., headache, chest pain)    Negative: Low blood pressure is main concern    Negative: [1] Systolic BP  >= 160 OR Diastolic >= 100 AND [2] cardiac or neurologic symptoms (e.g., chest pain, difficulty breathing, unsteady gait, blurred vision)    Negative: [1] Pregnant > 20 weeks (or postpartum < 6 weeks) AND [2] new hand or face swelling    Negative: [1] Pregnant > 20 weeks AND [2] BP Systolic BP  >= 140 OR Diastolic >= 90    [1] Systolic BP  >= 200 OR Diastolic >= 120  AND [2] having NO cardiac or neurologic symptoms    Protocols used: HIGH BLOOD PRESSURE-A-AH

## 2021-03-01 NOTE — NURSING NOTE
"Chief Complaint   Patient presents with     Hypertension       Initial BP (!) 162/110   Pulse 79   Temp 98.2  F (36.8  C) (Oral)   Resp 22   Wt 94.3 kg (208 lb)   SpO2 98%   BMI 29.01 kg/m   Estimated body mass index is 29.01 kg/m  as calculated from the following:    Height as of 7/22/20: 1.803 m (5' 11\").    Weight as of this encounter: 94.3 kg (208 lb).  Medication Reconciliation: complete  Luis Manuel Stafford CMA    "

## 2021-03-01 NOTE — PROGRESS NOTES
"      Assessment & Plan     (I10) Hypertension goal BP (blood pressure) < 140/90  (primary encounter diagnosis)  Comment: previously well controlled  Plan: COMPREHENSIVE METABOLIC PANEL, Lipid panel         reflex to direct LDL Fasting        Discussed that he should go to ED as he was in hypertensive emergency. Reviewed s/sx requiring immediate evaluation.   Continue current meds as improved since this am.  Follow-up with new primary Dr. Jules within 2-3 weeks.  Avoid supplement.                   BMI:   Estimated body mass index is 29.01 kg/m  as calculated from the following:    Height as of 7/22/20: 1.803 m (5' 11\").    Weight as of this encounter: 94.3 kg (208 lb).       Patient Instructions       Continue current medications each morning.    Avoid supplement.    If symptoms recur, report immediately to ED      [facility] for vaccines for 75+ and Healthcare Workers:  (Roughly [phone] spots/day) -            [facility]  .         [place]     Appointments ARE NEEDED, NO WALK-INS!     There is another way to schedule, but [facility] is more efficient.      The direct phone # for vaccine scheduling is: [phone]    Go to [email or web address] to register for the wait list.        [facility] information for Covid vaccine:   Phone:  [phone], or toll free, [phone]  [place] Registration: [email or web address] then clinic on Find My Vaccine.        Return in about 2 weeks (around 3/15/2021) for Wellness Exam.    Sandra Mcgill MD  [facility] KALYAN Meza is a 70 year old who presents for the following health issues  accompanied by his spouse:    HPI    Per triage note:     Wife is calling and states patient is complaining of elevated blood pressure of 251/116. Caller states patient is dizzy, " weak and had a clear emesis. Caller denies any other symptoms or fever. Triage guidelines recommend to see pcp within 4 hours. Triager called out to answering service, spoke with on call provider Dr. Viveros, he states patient should be seen in the ED. Triager called out to wife Kaley and updated regarding providers' advice. Caller verbalized and understands directives.  Shalini Neal RN       Started focus factor supplement for first dose on Saturday.  Wife concerned this could have affected blood pressure.   Noting blurred vision and extreme dizziness with vomiting today.   Dizziness has improved since taking medication this AM.   Not following a low salt diet at home but does not add salt to food.   Noting occasional swelling in ankles     Took meds this am and feeling better.  Declined to go to ED    Now reports symptoms nearly resolved.  Has not taken supplement since Saturday morning.           Labs reviewed in EPIC  BP Readings from Last 3 Encounters:   03/01/21 (!) 152/82   11/16/20 132/72   09/15/20 (!) 152/100    Wt Readings from Last 3 Encounters:   03/01/21 94.3 kg (208 lb)   08/24/20 93.4 kg (206 lb)   07/22/20 94.3 kg (208 lb)                  Patient Active Problem List   Diagnosis     HYPERLIPIDEMIA LDL GOAL <130     Hypertension goal BP (blood pressure) < 140/90     Advanced directives, counseling/discussion     Benign non-nodular prostatic hyperplasia without lower urinary tract symptoms     Impacted cerumen of left ear     Chronic kidney disease, stage 3     Past Surgical History:   Procedure Laterality Date     NO HISTORY OF SURGERY         Social History     Tobacco Use     Smoking status: Never Smoker     Smokeless tobacco: Never Used   Substance Use Topics     Alcohol use: Yes     Comment: occasionally     Family History   Problem Relation Age of Onset     Diabetes Father      Heart Disease Brother          Current Outpatient Medications   Medication Sig Dispense Refill     amLODIPine  (NORVASC) 5 MG tablet Take 1 tablet (5 mg) by mouth daily 90 tablet 1     finasteride (PROSCAR) 5 MG tablet Take 1 tablet (5 mg) by mouth daily 90 tablet 1     hydrochlorothiazide (HYDRODIURIL) 25 MG tablet Take 1 tablet (25 mg) by mouth daily 90 tablet 1     multivitamin, therapeutic with minerals (MULTI-VITAMIN) TABS Take 1 tablet by mouth daily.       simvastatin (ZOCOR) 20 MG tablet Take 1 tablet (20 mg) by mouth At Bedtime 90 tablet 1     tamsulosin (FLOMAX) 0.4 MG capsule Take 2 capsules (0.8 mg) by mouth daily 180 capsule 3        Review of Systems   Constitutional, HEENT, cardiovascular, pulmonary, gi and gu systems are negative, except as otherwise noted.      Objective    BP (!) 152/82   Pulse 79   Temp 98.2  F (36.8  C) (Oral)   Resp 22   Wt 94.3 kg (208 lb)   SpO2 98%   BMI 29.01 kg/m    Body mass index is 29.01 kg/m .  Physical Exam   GENERAL: healthy, alert and no distress  EYES: Eyes grossly normal to inspection, PERRL and conjunctivae and sclerae normal  RESP: lungs clear to auscultation - no rales, rhonchi or wheezes  CV: regular rate and rhythm, normal S1 S2, no S3 or S4, no murmur, click or rub, no peripheral edema and peripheral pulses strong  MS: no gross musculoskeletal defects noted, no edema  SKIN: no suspicious lesions or rashes  NEURO: Normal strength and tone, sensory exam grossly normal, mentation intact and cranial nerves 2-12 intact  PSYCH: mentation appears normal, affect normal/bright

## 2021-04-11 ENCOUNTER — HEALTH MAINTENANCE LETTER (OUTPATIENT)
Age: 71
End: 2021-04-11

## 2021-04-29 ENCOUNTER — TELEPHONE (OUTPATIENT)
Dept: FAMILY MEDICINE | Facility: CLINIC | Age: 71
End: 2021-04-29

## 2021-04-29 NOTE — TELEPHONE ENCOUNTER
Patient Quality Outreach      Summary:    Patient has the following on his problem list/HM:   IVD     ASA: FAILED    Last LDL:    Lab Results   Component Value Date    CHOL 188 03/01/2021     Lab Results   Component Value Date    HDL 66 03/01/2021     Lab Results   Component Value Date     03/01/2021     Lab Results   Component Value Date    TRIG 63 03/01/2021        Lab Results   Component Value Date    CHOLHDLRATIO 3.6 07/23/2015        Is the patient on a Statin? Yes   Is the patient on Aspirin? No                  Medications     HMG CoA Reductase Inhibitors     simvastatin (ZOCOR) 20 MG tablet             Last three blood pressure readings:  BP Readings from Last 3 Encounters:   03/01/21 (!) 152/82   11/16/20 132/72   09/15/20 (!) 152/100        Tobacco History:       Tobacco Use      Smoking status: Never Smoker      Smokeless tobacco: Never Used      Hypertension   Last three blood pressure readings:  BP Readings from Last 3 Encounters:   03/01/21 (!) 152/82   11/16/20 132/72   09/15/20 (!) 152/100     Blood pressure: Failed    HTN Guidelines:  ? 139/89     Patient is due/failing the following:   BP check, Aspirin and Annual wellness, date due: now    Type of outreach:    Sent QVIVO message.    Questions for provider review:    None                                                                                                                                     Luis Manuel Stafford CMA       Chart routed to closed.

## 2021-07-22 DIAGNOSIS — I10 HYPERTENSION GOAL BP (BLOOD PRESSURE) < 140/90: ICD-10-CM

## 2021-07-22 DIAGNOSIS — E78.00 HYPERCHOLESTEREMIA: ICD-10-CM

## 2021-07-22 DIAGNOSIS — N40.0 BENIGN NON-NODULAR PROSTATIC HYPERPLASIA WITHOUT LOWER URINARY TRACT SYMPTOMS: ICD-10-CM

## 2021-07-22 DIAGNOSIS — I10 ESSENTIAL HYPERTENSION: ICD-10-CM

## 2021-07-22 NOTE — TELEPHONE ENCOUNTER
Reason for Call: Request for an order or referral: Med Refill    Order or referral being requested:     AmLODIPine (NORVASC) 5 MG tablet  Hydrochlorothiazide (HYDRODIURIL) 25 MG tablet  Simvastatin (ZOCOR) 20 MG tablet  Finasteride (PROSCAR) 5 MG tablet  Tamsulosin (FLOMAX) 0.4 MG capsule    Date needed: ASAP    Has the patient been seen by the PCP for this problem? Yes    Additional comments: Send order to [facility], update pt's spouse if possible.    Phone number Patient can be reached at:  [employer]- [phone]  PT's spouse- [phone]    Best Time:  [employer]- anytime until 5pm  PT's spouse-Unsure    Can we leave a detailed message on this number?  [employer]- Yes  PT's spouse-Unsure    Call taken on 7/22/2021 at 2:29 PM by Skip Damian

## 2021-07-22 NOTE — LETTER
July 27, 2021    Erick Yusuf  [address]  [place] [address]    Dear Erick,       We recently received a refill request for multiple medications.  We have refilled this for a one time supply only because you are due for a:    Blood Pressure & Cholesterol office visit and a FASTING lab appointment FOR FURTHER REFILLS in the next 30 days.      Please schedule this lab appointment 4-5 days prior to the office visit.     Please call at your earliest convenience so that there will not be a delay with your future refills.          Thank you,   Your [facility] Team/[facility]  [phone]

## 2021-07-23 RX ORDER — AMLODIPINE BESYLATE 5 MG/1
5 TABLET ORAL DAILY
Qty: 30 TABLET | Refills: 0 | Status: SHIPPED | OUTPATIENT
Start: 2021-07-23 | End: 2021-08-09

## 2021-07-23 RX ORDER — TAMSULOSIN HYDROCHLORIDE 0.4 MG/1
0.8 CAPSULE ORAL DAILY
Qty: 60 CAPSULE | Refills: 0 | Status: SHIPPED | OUTPATIENT
Start: 2021-07-23 | End: 2021-08-11

## 2021-07-23 RX ORDER — SIMVASTATIN 20 MG
20 TABLET ORAL AT BEDTIME
Qty: 90 TABLET | Refills: 1 | Status: SHIPPED | OUTPATIENT
Start: 2021-07-23 | End: 2021-08-11

## 2021-07-23 RX ORDER — HYDROCHLOROTHIAZIDE 25 MG/1
25 TABLET ORAL DAILY
Qty: 30 TABLET | Refills: 0 | Status: SHIPPED | OUTPATIENT
Start: 2021-07-23 | End: 2021-08-09

## 2021-07-23 RX ORDER — FINASTERIDE 5 MG/1
5 TABLET, FILM COATED ORAL DAILY
Qty: 90 TABLET | Refills: 1 | Status: SHIPPED | OUTPATIENT
Start: 2021-07-23 | End: 2022-01-12

## 2021-07-23 NOTE — TELEPHONE ENCOUNTER
last visit in march was told to follow up in 2-3 weeks. Needs to be seen with pre visit lab angie.

## 2021-07-23 NOTE — TELEPHONE ENCOUNTER
Phone number listed for last [employer] pharmacy used comes up as [facility].  [employer] [place] not found. I called wife who states pt's bottles [facility].     Routing refill request to provider for review/approval because:  BP Readings from Last 3 Encounters:   03/01/21 (!) 152/82   11/16/20 132/72   09/15/20 (!) 152/100     Susan CONNERN, RN

## 2021-07-29 ENCOUNTER — TELEPHONE (OUTPATIENT)
Dept: FAMILY MEDICINE | Facility: CLINIC | Age: 71
End: 2021-07-29

## 2021-07-29 NOTE — TELEPHONE ENCOUNTER
Central scheduling calling, please pend lab orders for upcomming appointment with PCP. Stefany DEVINE -

## 2021-08-03 NOTE — PROGRESS NOTES
"  SUBJECTIVE:   Erick Yusuf is a 70 year old male who presents for Preventive Visit.    Patient has been advised of split billing requirements and indicates understanding: Yes  Are you in the first 12 months of your Medicare Part B coverage?  No    Physical Health:    Answers for HPI/ROS submitted by the patient on 8/11/2021  In general, how would you rate your overall physical health?: good  Frequency of exercise:: 1 day/week  Do you usually eat at least 4 servings of fruit and vegetables a day, include whole grains & fiber, and avoid regularly eating high fat or \"junk\" foods? : No  Taking medications regularly:: Yes  Medication side effects:: None  Activities of Daily Living: no assistance needed  Home safety: no safety concerns identified  Hearing Impairment:: no hearing concerns  In the past 6 months, have you been bothered by leaking of urine?: No  abdominal pain: No  Blood in stool: No  Blood in urine: No  chest pain: No  chills: No  congestion: No  constipation: No  cough: No  diarrhea: No  dizziness: Yes  ear pain: No  eye pain: No  nervous/anxious: No  fever: No  frequency: No  genital sores: No  headaches: No  hearing loss: No  heartburn: No  arthralgias: No  joint swelling: No  peripheral edema: No  mood changes: No  myalgias: No  nausea: No  dysuria: No  palpitations: No  Skin sensation changes: No  sore throat: No  urgency: No  rash: No  shortness of breath: No  visual disturbance: No  weakness: No  impotence: No  penile discharge: No  In general, how would you rate your overall mental or emotional health?: good  Additional concerns today:: No  Duration of exercise:: 15-30 minutes    Additional concerns to address?  No  SUBJECTIVE:  70 year old enters for recheck of high cholesterol.  Pt. Has been taking med and has no side effects. Pt is following diet.  Denies chest pain and SOB.  Past Medical History:   Diagnosis Date     HTN (hypertension)      Hypercholesteremia      Leucopenia      Past " "Surgical History:   Procedure Laterality Date     NO HISTORY OF SURGERY         Current Outpatient Medications:      finasteride (PROSCAR) 5 MG tablet, Take 1 tablet (5 mg) by mouth daily, Disp: 90 tablet, Rfl: 1     hydrochlorothiazide (HYDRODIURIL) 25 MG tablet, Take 1 tablet (25 mg) by mouth daily, Disp: 90 tablet, Rfl: 3     multivitamin, therapeutic with minerals (MULTI-VITAMIN) TABS, Take 1 tablet by mouth daily., Disp: , Rfl:      simvastatin (ZOCOR) 20 MG tablet, Take 1 tablet (20 mg) by mouth At Bedtime, Disp: 90 tablet, Rfl: 1     tamsulosin (FLOMAX) 0.4 MG capsule, Take 1 capsule (0.4 mg) by mouth daily, Disp: 90 capsule, Rfl: 1  Reviewed health maintenance   Patient Active Problem List   Diagnosis     HYPERLIPIDEMIA LDL GOAL <130     Hypertension goal BP (blood pressure) < 140/90     Advanced directives, counseling/discussion     Benign non-nodular prostatic hyperplasia without lower urinary tract symptoms     Impacted cerumen of left ear       OBJECTIVE:  no apparent distress  /84   Pulse 97   Temp 97.5  F (36.4  C) (Tympanic)   Resp 14   Ht 1.803 m (5' 11\")   Wt 91.6 kg (202 lb)   SpO2 97%   BMI 28.17 kg/m        Exam:  Constitutional: healthy, alert and no distress  Head: Normocephalic. No masses, lesions, tenderness or abnormalities  Neck: Neck supple. No adenopathy. Thyroid symmetric, normal size,  Cardiovascular: negative, PMI normal. No lifts, heaves, or thrills. RRR. No murmurs, clicks gallops or rub  Respiratory: negative Lungs clear    Office Visit on 03/01/2021   Component Date Value Ref Range Status     Sodium 03/01/2021 139  133 - 144 mmol/L Final     Potassium 03/01/2021 3.7  3.4 - 5.3 mmol/L Final     Chloride 03/01/2021 105  94 - 109 mmol/L Final     Carbon Dioxide 03/01/2021 31  20 - 32 mmol/L Final     Anion Gap 03/01/2021 3  3 - 14 mmol/L Final     Glucose 03/01/2021 126* 70 - 99 mg/dL Final    Fasting specimen     Urea Nitrogen 03/01/2021 17  7 - 30 mg/dL Final     " Creatinine 03/01/2021 1.10  0.66 - 1.25 mg/dL Final     GFR Estimate 03/01/2021 68  >60 mL/min/[1.73_m2] Final    Comment: Non  GFR Calc  Starting 12/18/2018, serum creatinine based estimated GFR (eGFR) will be   calculated using the Chronic Kidney Disease Epidemiology Collaboration   (CKD-EPI) equation.       GFR Estimate If Black 03/01/2021 78  >60 mL/min/[1.73_m2] Final    Comment:  GFR Calc  Starting 12/18/2018, serum creatinine based estimated GFR (eGFR) will be   calculated using the Chronic Kidney Disease Epidemiology Collaboration   (CKD-EPI) equation.       Calcium 03/01/2021 9.6  8.5 - 10.1 mg/dL Final     Bilirubin Total 03/01/2021 0.9  0.2 - 1.3 mg/dL Final     Albumin 03/01/2021 4.4  3.4 - 5.0 g/dL Final     Protein Total 03/01/2021 7.4  6.8 - 8.8 g/dL Final     Alkaline Phosphatase 03/01/2021 96  40 - 150 U/L Final     ALT 03/01/2021 26  0 - 70 U/L Final     AST 03/01/2021 17  0 - 45 U/L Final     Cholesterol 03/01/2021 188  <200 mg/dL Final     Triglycerides 03/01/2021 63  <150 mg/dL Final    Fasting specimen     HDL Cholesterol 03/01/2021 66  >39 mg/dL Final     LDL Cholesterol Calculated 03/01/2021 109* <100 mg/dL Final    Comment: Above desirable:  100-129 mg/dl  Borderline High:  130-159 mg/dL  High:             160-189 mg/dL  Very high:       >189 mg/dl       Non HDL Cholesterol 03/01/2021 122  <130 mg/dL Final             ICD-10-CM    1. Encounter for Medicare annual wellness exam  Z00.00    2. Hypertension goal BP (blood pressure) < 140/90  I10 hydrochlorothiazide (HYDRODIURIL) 25 MG tablet     **Comprehensive metabolic panel FUTURE 2mo   3. Hypercholesteremia  E78.00 simvastatin (ZOCOR) 20 MG tablet   4. Benign non-nodular prostatic hyperplasia without lower urinary tract symptoms  N40.0 tamsulosin (FLOMAX) 0.4 MG capsule    PLAN: Follow up in 6 months

## 2021-08-03 NOTE — PATIENT INSTRUCTIONS
Patient Education   Personalized Prevention Plan  You are due for the preventive services outlined below.  Your care team is available to assist you in scheduling these services.  If you have already completed any of these items, please share that information with your care team to update in your medical record.  Health Maintenance Due   Topic Date Due     ANNUAL REVIEW OF HM ORDERS  Never done     Annual Wellness Visit  Never done     AORTIC ANEURYSM SCREENING (SYSTEM ASSIGNED)  Never done     FALL RISK ASSESSMENT  07/22/2021

## 2021-08-11 ENCOUNTER — OFFICE VISIT (OUTPATIENT)
Dept: FAMILY MEDICINE | Facility: CLINIC | Age: 71
End: 2021-08-11
Payer: COMMERCIAL

## 2021-08-11 VITALS
SYSTOLIC BLOOD PRESSURE: 133 MMHG | RESPIRATION RATE: 14 BRPM | OXYGEN SATURATION: 97 % | BODY MASS INDEX: 28.28 KG/M2 | HEART RATE: 97 BPM | DIASTOLIC BLOOD PRESSURE: 84 MMHG | HEIGHT: 71 IN | TEMPERATURE: 97.5 F | WEIGHT: 202 LBS

## 2021-08-11 DIAGNOSIS — N40.0 BENIGN NON-NODULAR PROSTATIC HYPERPLASIA WITHOUT LOWER URINARY TRACT SYMPTOMS: ICD-10-CM

## 2021-08-11 DIAGNOSIS — I10 HYPERTENSION GOAL BP (BLOOD PRESSURE) < 140/90: Primary | ICD-10-CM

## 2021-08-11 DIAGNOSIS — E78.00 HYPERCHOLESTEREMIA: ICD-10-CM

## 2021-08-11 LAB
ALBUMIN SERPL-MCNC: 4.4 G/DL (ref 3.4–5)
ALP SERPL-CCNC: 106 U/L (ref 40–150)
ALT SERPL W P-5'-P-CCNC: 26 U/L (ref 0–70)
ANION GAP SERPL CALCULATED.3IONS-SCNC: 7 MMOL/L (ref 3–14)
AST SERPL W P-5'-P-CCNC: 18 U/L (ref 0–45)
BILIRUB SERPL-MCNC: 1 MG/DL (ref 0.2–1.3)
BUN SERPL-MCNC: 19 MG/DL (ref 7–30)
CALCIUM SERPL-MCNC: 9.5 MG/DL (ref 8.5–10.1)
CHLORIDE BLD-SCNC: 106 MMOL/L (ref 94–109)
CO2 SERPL-SCNC: 26 MMOL/L (ref 20–32)
CREAT SERPL-MCNC: 1.08 MG/DL (ref 0.66–1.25)
GFR SERPL CREATININE-BSD FRML MDRD: 69 ML/MIN/1.73M2
GLUCOSE BLD-MCNC: 105 MG/DL (ref 70–99)
POTASSIUM BLD-SCNC: 3.3 MMOL/L (ref 3.4–5.3)
PROT SERPL-MCNC: 7.9 G/DL (ref 6.8–8.8)
SODIUM SERPL-SCNC: 139 MMOL/L (ref 133–144)

## 2021-08-11 PROCEDURE — 99214 OFFICE O/P EST MOD 30 MIN: CPT | Performed by: FAMILY MEDICINE

## 2021-08-11 PROCEDURE — 80053 COMPREHEN METABOLIC PANEL: CPT | Performed by: FAMILY MEDICINE

## 2021-08-11 PROCEDURE — 36415 COLL VENOUS BLD VENIPUNCTURE: CPT | Performed by: FAMILY MEDICINE

## 2021-08-11 RX ORDER — SIMVASTATIN 20 MG
20 TABLET ORAL AT BEDTIME
Qty: 90 TABLET | Refills: 1 | Status: SHIPPED | OUTPATIENT
Start: 2021-08-11 | End: 2022-03-24

## 2021-08-11 RX ORDER — HYDROCHLOROTHIAZIDE 25 MG/1
25 TABLET ORAL DAILY
Qty: 90 TABLET | Refills: 3 | Status: SHIPPED | OUTPATIENT
Start: 2021-08-11 | End: 2022-10-16

## 2021-08-11 RX ORDER — TAMSULOSIN HYDROCHLORIDE 0.4 MG/1
0.4 CAPSULE ORAL DAILY
Qty: 90 CAPSULE | Refills: 1 | Status: SHIPPED | OUTPATIENT
Start: 2021-08-11 | End: 2022-03-24

## 2021-08-11 ASSESSMENT — ENCOUNTER SYMPTOMS
FREQUENCY: 0
FEVER: 0
ARTHRALGIAS: 0
WEAKNESS: 0
HEADACHES: 0
SHORTNESS OF BREATH: 0
PARESTHESIAS: 0
HEMATURIA: 0
HEARTBURN: 0
ABDOMINAL PAIN: 0
NERVOUS/ANXIOUS: 0
CHILLS: 0
SORE THROAT: 0
DIZZINESS: 1
HEMATOCHEZIA: 0
DIARRHEA: 0
CONSTIPATION: 0
PALPITATIONS: 0
JOINT SWELLING: 0
MYALGIAS: 0
EYE PAIN: 0
DYSURIA: 0
COUGH: 0
NAUSEA: 0

## 2021-08-11 ASSESSMENT — MIFFLIN-ST. JEOR: SCORE: 1698.4

## 2021-08-11 ASSESSMENT — ACTIVITIES OF DAILY LIVING (ADL): CURRENT_FUNCTION: NO ASSISTANCE NEEDED

## 2021-09-26 ENCOUNTER — HEALTH MAINTENANCE LETTER (OUTPATIENT)
Age: 71
End: 2021-09-26

## 2021-10-18 DIAGNOSIS — I10 HYPERTENSION GOAL BP (BLOOD PRESSURE) < 140/90: Primary | ICD-10-CM

## 2021-10-18 RX ORDER — AMLODIPINE BESYLATE 5 MG/1
TABLET ORAL
Qty: 90 TABLET | Refills: 1 | Status: SHIPPED | OUTPATIENT
Start: 2021-10-18 | End: 2022-03-24

## 2021-11-12 ENCOUNTER — OFFICE VISIT (OUTPATIENT)
Dept: FAMILY MEDICINE | Facility: CLINIC | Age: 71
End: 2021-11-12
Payer: COMMERCIAL

## 2021-11-12 VITALS
DIASTOLIC BLOOD PRESSURE: 74 MMHG | SYSTOLIC BLOOD PRESSURE: 142 MMHG | OXYGEN SATURATION: 98 % | HEART RATE: 94 BPM | BODY MASS INDEX: 28.56 KG/M2 | TEMPERATURE: 98.2 F | RESPIRATION RATE: 14 BRPM | WEIGHT: 204.8 LBS

## 2021-11-12 DIAGNOSIS — J20.9 ACUTE BRONCHITIS WITH SYMPTOMS GREATER THAN 10 DAYS: Primary | ICD-10-CM

## 2021-11-12 PROCEDURE — 99213 OFFICE O/P EST LOW 20 MIN: CPT | Performed by: PHYSICIAN ASSISTANT

## 2021-11-12 RX ORDER — AZITHROMYCIN 250 MG/1
TABLET, FILM COATED ORAL
Qty: 6 TABLET | Refills: 0 | Status: SHIPPED | OUTPATIENT
Start: 2021-11-12 | End: 2021-11-12

## 2021-11-12 RX ORDER — AZITHROMYCIN 250 MG/1
TABLET, FILM COATED ORAL
Qty: 6 TABLET | Refills: 0 | Status: SHIPPED | OUTPATIENT
Start: 2021-11-12 | End: 2021-11-17

## 2021-11-12 ASSESSMENT — PAIN SCALES - GENERAL: PAINLEVEL: NO PAIN (0)

## 2021-11-12 NOTE — PROGRESS NOTES
"  Assessment & Plan     1. Acute bronchitis with symptoms greater than 10 days        We talked about different causes of cough today. Will treat him for bacterial bronchitis with the z-pack. If no improvements in 1 week he'll follow up in clinic for further evaluation.       Prescription drug management    {Provider  Link to [facility] Help Grid :[id number]}     BMI:   Estimated body mass index is 28.56 kg/m  as calculated from the following:    Height as of 8/11/21: 1.803 m (5' 11\").    Weight as of this encounter: 92.9 kg (204 lb 12.8 oz).     Return in about 1 week (around 11/19/2021), or if symptoms worsen or fail to improve.    Ashlee Dockery PA-C  [facility] [place] Meza is a 71 year old who presents for the following health issues     HPI     Patient says after he got his Covid and pneumonia vaccine he started coughing. Coughing for 1 week now. Some mucous, not really yellow. Has had this before. Microplasma pneumonia. Needs a Z-geni    Sinuses are open, no congestion or pressure  Denies ear pain, sore throat  Never had any fevers/chills  Did have some fatigue after the Covid vaccine  Received his Covid vaccine 6 days ago      Review of Systems   Constitutional, HEENT, cardiovascular, pulmonary, gi and gu systems are negative, except as otherwise noted.      Objective    BP (!) 142/74   Pulse 94   Temp 98.2  F (36.8  C) (Tympanic)   Resp 14   Wt 92.9 kg (204 lb 12.8 oz)   SpO2 98%   BMI 28.56 kg/m    Body mass index is 28.56 kg/m .  Physical Exam   GENERAL: healthy, alert and no distress  RESP: lungs clear to auscultation - no rales, rhonchi or wheezes  CV: regular rates and rhythm, normal S1 S2, no S3 or S4 and no murmur, click or rub  MS: no gross musculoskeletal defects noted, no edema  SKIN: no suspicious lesions or rashes  NEURO: Normal strength and tone, mentation intact and speech normal  PSYCH: mentation appears normal, affect normal/bright          "

## 2021-11-18 ENCOUNTER — TELEPHONE (OUTPATIENT)
Dept: FAMILY MEDICINE | Facility: CLINIC | Age: 71
End: 2021-11-18
Payer: COMMERCIAL

## 2021-11-18 NOTE — TELEPHONE ENCOUNTER
Azithromycin is taken for 5 days, but it continues to work for 10 days. I do not recommend a second z-pack. Infection should be treated, now he's just got to continue with improvements

## 2021-11-18 NOTE — TELEPHONE ENCOUNTER
PHI on file to speak with wife, Kaley  Patient seen for bronchitis by provider on 11/12, given prescription for Z-geni. 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       To provider to advise      Felisha CONNERN, RN

## 2021-11-18 NOTE — TELEPHONE ENCOUNTER
I do see we have consent to communicate with Kaley on file.    I called and spoke to So, advised her of Ashlee's response.    She will let Erick know, says he will be disappointed but will encourage him to push fluids, cough and deep breath.   Will call Monday if not continuing to improve or is worse.    Mercedes Smith RN  [facility]

## 2021-11-29 ENCOUNTER — TELEPHONE (OUTPATIENT)
Dept: FAMILY MEDICINE | Facility: CLINIC | Age: 71
End: 2021-11-29
Payer: COMMERCIAL

## 2021-11-29 DIAGNOSIS — R05.9 COUGH: Primary | ICD-10-CM

## 2021-11-29 RX ORDER — AZITHROMYCIN 250 MG/1
TABLET, FILM COATED ORAL
Qty: 6 TABLET | Refills: 0 | Status: SHIPPED | OUTPATIENT
Start: 2021-11-29 | End: 2023-02-15

## 2021-11-29 NOTE — TELEPHONE ENCOUNTER
Left message on pt vm that antibiotic was sent to his pharmacy by Dr. Rushing.  Susan Araujo BSN, RN

## 2021-11-29 NOTE — TELEPHONE ENCOUNTER
Reason for Call:  Other prescription    Detailed comments: PT would like a call back to discuss getting a prescription-Azithromycin. Please call back to discus.     Phone Number Patient can be reached at: Cell number on file:    Telephone Information:   Mobile [phone]       Best Time: any    Can we leave a detailed message on this number? YES    Call taken on 11/29/2021 at 6:40 AM by Janneth Mcclendon

## 2021-11-29 NOTE — TELEPHONE ENCOUNTER
"I see office visit with Ashlee Dockery on 11/12/21.   PCP is [facility] provider Dr. Rushing.    Plan per 11/12/21 visit:        1. Acute bronchitis with symptoms greater than 10 days          We talked about different causes of cough today. Will treat him for bacterial bronchitis with the z-pack. If no improvements in 1 week he'll follow up in clinic for further evaluation.       No follow up noted, I called home number, wife Kaley answered, we do have consent to communicate.    She points out previous call on 11/18, Ashlee suggested he wait another week or so in anticipation of ongoing improvement after the z-pack.    She says he improved on the z-pack and the cough nearly resolved but not completely and starting this past Tuesday it came back, denies fever or severely short of breath but again having productive cough.   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   She says Erick generally needs more than one round of antibiotic for cough, \"he knows his body\".    Provider who saw patient is not in clinic today, routed to Ashlee Dockery and covering provider pool as well as PCP (but Kaley says Dr. Rushing only works on Wednesdays).    Pharmacy selected, perhaps another round of azithromycin?      Mercedes Smith RN  [facility]        "

## 2022-01-12 ENCOUNTER — OFFICE VISIT (OUTPATIENT)
Dept: FAMILY MEDICINE | Facility: CLINIC | Age: 72
End: 2022-01-12
Payer: COMMERCIAL

## 2022-01-12 VITALS
DIASTOLIC BLOOD PRESSURE: 80 MMHG | SYSTOLIC BLOOD PRESSURE: 150 MMHG | HEIGHT: 71 IN | RESPIRATION RATE: 16 BRPM | OXYGEN SATURATION: 96 % | BODY MASS INDEX: 28.42 KG/M2 | HEART RATE: 80 BPM | TEMPERATURE: 96.9 F | WEIGHT: 203 LBS

## 2022-01-12 DIAGNOSIS — I10 HYPERTENSION GOAL BP (BLOOD PRESSURE) < 140/90: Primary | ICD-10-CM

## 2022-01-12 DIAGNOSIS — N40.0 BENIGN NON-NODULAR PROSTATIC HYPERPLASIA WITHOUT LOWER URINARY TRACT SYMPTOMS: ICD-10-CM

## 2022-01-12 DIAGNOSIS — M75.41 IMPINGEMENT SYNDROME OF SHOULDER REGION, RIGHT: ICD-10-CM

## 2022-01-12 PROCEDURE — 99214 OFFICE O/P EST MOD 30 MIN: CPT | Performed by: FAMILY MEDICINE

## 2022-01-12 RX ORDER — FINASTERIDE 5 MG/1
5 TABLET, FILM COATED ORAL DAILY
Qty: 90 TABLET | Refills: 1 | Status: SHIPPED | OUTPATIENT
Start: 2022-01-12 | End: 2022-09-26

## 2022-01-12 ASSESSMENT — MIFFLIN-ST. JEOR: SCORE: 1697.93

## 2022-01-12 NOTE — PROGRESS NOTES
"SUBJECTIVE:  Erick Yusuf is a 71 year old male  who is seen for  right shoulder pain that started   1 month ago.   Onset of injury: no  Immediate symptoms: pain at night.  Relieving Factors:  Nothing   Worsening Factors: feels tight when liting over head  Symptoms have been gradual since that time.  Prior history of related problems: no prior problems with this area in the past.    Patients past medical, surgical, social and family histories reviewed.     OBJECTIVE:  Blood pressure (!) 150/80, pulse 80, temperature 96.9  F (36.1  C), temperature source Tympanic, resp. rate 16, height 1.803 m (5' 11\"), weight 92.1 kg (203 lb), SpO2 96 %.     MUSCULOSKELETAL:  RIGHT SHOULDER  Inspection: no swelling, bruising, discoloration, or obvious deformity or asymmetry  Tender: supraspinatus   Non-tender: SC joint, AC joint, acromion, anterior capsule, greater tuberosity and proximal humerus  Range of Motion  Active:all normal  Passive: all normal  Strength: rotator cuff strength full      ICD-10-CM    1. Hypertension goal BP (blood pressure) < 140/90  I10 Lipid panel reflex to direct LDL Fasting     **Comprehensive metabolic panel FUTURE 2mo   2. Benign non-nodular prostatic hyperplasia without lower urinary tract symptoms  N40.0 finasteride (PROSCAR) 5 MG tablet   3. Impingement syndrome of shoulder region, right  M75.41     PLAN:increase amlodine to 10 if not at 140/90    "

## 2022-02-17 PROBLEM — Z71.89 ADVANCED DIRECTIVES, COUNSELING/DISCUSSION: Status: ACTIVE | Noted: 2017-11-07

## 2022-03-24 DIAGNOSIS — N40.0 BENIGN NON-NODULAR PROSTATIC HYPERPLASIA WITHOUT LOWER URINARY TRACT SYMPTOMS: ICD-10-CM

## 2022-03-24 DIAGNOSIS — I10 HYPERTENSION GOAL BP (BLOOD PRESSURE) < 140/90: ICD-10-CM

## 2022-03-24 DIAGNOSIS — E78.00 HYPERCHOLESTEREMIA: ICD-10-CM

## 2022-03-24 RX ORDER — AMLODIPINE BESYLATE 5 MG/1
TABLET ORAL
Qty: 90 TABLET | Refills: 0 | Status: SHIPPED | OUTPATIENT
Start: 2022-03-24 | End: 2022-05-06

## 2022-03-24 RX ORDER — SIMVASTATIN 20 MG
TABLET ORAL
Qty: 90 TABLET | Refills: 0 | Status: SHIPPED | OUTPATIENT
Start: 2022-03-24 | End: 2022-04-18

## 2022-03-24 RX ORDER — TAMSULOSIN HYDROCHLORIDE 0.4 MG/1
CAPSULE ORAL
Qty: 90 CAPSULE | Refills: 0 | Status: SHIPPED | OUTPATIENT
Start: 2022-03-24 | End: 2022-04-18

## 2022-03-24 NOTE — TELEPHONE ENCOUNTER
Called and spoke to patient's wife. She will have patient call back to set up an appointment with Dr Rushing.India Grajeda MA/ARIEL

## 2022-03-24 NOTE — TELEPHONE ENCOUNTER
Needs to be seen in the next month by me May add to the last half of a 30 minute appointment. Do not send letter but call to make the appointment.  Needs follow up of blood pressure elevated from last visit.

## 2022-03-30 ENCOUNTER — OFFICE VISIT (OUTPATIENT)
Dept: FAMILY MEDICINE | Facility: CLINIC | Age: 72
End: 2022-03-30
Payer: COMMERCIAL

## 2022-03-30 ENCOUNTER — ANCILLARY PROCEDURE (OUTPATIENT)
Dept: GENERAL RADIOLOGY | Facility: CLINIC | Age: 72
End: 2022-03-30
Attending: PHYSICIAN ASSISTANT
Payer: COMMERCIAL

## 2022-03-30 ENCOUNTER — TELEPHONE (OUTPATIENT)
Dept: NURSING | Facility: CLINIC | Age: 72
End: 2022-03-30

## 2022-03-30 VITALS
RESPIRATION RATE: 14 BRPM | TEMPERATURE: 97.6 F | SYSTOLIC BLOOD PRESSURE: 155 MMHG | WEIGHT: 206.8 LBS | DIASTOLIC BLOOD PRESSURE: 81 MMHG | BODY MASS INDEX: 28.84 KG/M2 | HEART RATE: 84 BPM | OXYGEN SATURATION: 97 %

## 2022-03-30 DIAGNOSIS — Z20.822 SUSPECTED COVID-19 VIRUS INFECTION: ICD-10-CM

## 2022-03-30 DIAGNOSIS — R05.9 COUGH: ICD-10-CM

## 2022-03-30 DIAGNOSIS — R06.2 WHEEZING: ICD-10-CM

## 2022-03-30 DIAGNOSIS — J18.9 PNEUMONIA OF RIGHT LOWER LOBE DUE TO INFECTIOUS ORGANISM: Primary | ICD-10-CM

## 2022-03-30 LAB
FLUAV AG SPEC QL IA: NEGATIVE
FLUBV AG SPEC QL IA: NEGATIVE

## 2022-03-30 PROCEDURE — 71046 X-RAY EXAM CHEST 2 VIEWS: CPT | Performed by: RADIOLOGY

## 2022-03-30 PROCEDURE — U0005 INFEC AGEN DETEC AMPLI PROBE: HCPCS | Performed by: PHYSICIAN ASSISTANT

## 2022-03-30 PROCEDURE — 99214 OFFICE O/P EST MOD 30 MIN: CPT | Performed by: PHYSICIAN ASSISTANT

## 2022-03-30 PROCEDURE — U0003 INFECTIOUS AGENT DETECTION BY NUCLEIC ACID (DNA OR RNA); SEVERE ACUTE RESPIRATORY SYNDROME CORONAVIRUS 2 (SARS-COV-2) (CORONAVIRUS DISEASE [COVID-19]), AMPLIFIED PROBE TECHNIQUE, MAKING USE OF HIGH THROUGHPUT TECHNOLOGIES AS DESCRIBED BY CMS-2020-01-R: HCPCS | Performed by: PHYSICIAN ASSISTANT

## 2022-03-30 PROCEDURE — 87804 INFLUENZA ASSAY W/OPTIC: CPT | Performed by: PHYSICIAN ASSISTANT

## 2022-03-30 RX ORDER — PREDNISONE 20 MG/1
40 TABLET ORAL DAILY
Qty: 10 TABLET | Refills: 0 | Status: SHIPPED | OUTPATIENT
Start: 2022-03-30 | End: 2022-04-04

## 2022-03-30 RX ORDER — AZITHROMYCIN 250 MG/1
TABLET, FILM COATED ORAL
Qty: 6 TABLET | Refills: 0 | Status: SHIPPED | OUTPATIENT
Start: 2022-03-30 | End: 2022-04-04

## 2022-03-30 RX ORDER — ALBUTEROL SULFATE 90 UG/1
1-2 AEROSOL, METERED RESPIRATORY (INHALATION) EVERY 4 HOURS PRN
Qty: 6.7 G | Refills: 0 | Status: SHIPPED | OUTPATIENT
Start: 2022-03-30 | End: 2022-07-05

## 2022-03-30 ASSESSMENT — PAIN SCALES - GENERAL: PAINLEVEL: NO PAIN (0)

## 2022-03-30 NOTE — CONFIDENTIAL NOTE
He should be taking both antibiotics at the same time as he has a clear pneumonia on CXR. This is a safer combination than taking the other option, Levaquin. please call with update.

## 2022-03-30 NOTE — PATIENT INSTRUCTIONS
Eddie Meza,    Thank you for allowing [facility] to manage your care.    I am unsure of the cause of your symptoms, but your story and exam are most consistent with wheezing in the context of a viral bronchitis. We will see what our workup shows.     If you develop worsening/changing symptoms at any time, please call 911 or go to the emergency department for evaluation.    I ordered some xrays, please go to our radiology department to get your xrays.    I sent your prescriptions to your pharmacy.    Prednisone Discharge Instructions:    Please take the steroid, Prednisone, for the full course as prescribed.  Take Prednisone with food or milk to minimize stomach upset.      Side effects of Prednisone include difficulty sleeping, increased appetite, weight gain, and changes in mood.  If you are diabetic, please monitor your blood sugar regularly while taking this medicine as Prednisone can cause high blood sugar.    Please allow 1-2 business days for our office to contact you in regards to your laboratory/radiological studies.  If not done so, I encourage you to login into [employer] ([email or web address] to review your results as well.     Drink 8-10 glasses of fluid daily to stay well-hydrated. Take a probiotic pill, eat live culture yogurt (Greek yogurt or Activia), drink kefir daily for one week after finishing the antibiotics to encourage growth of good bacteria in your system.    Your blood pressure was high today. Get a blood pressure cuff for use at home. Take and record your blood pressure daily for 2 weeks. If your blood pressure is greater than or equal to 140/90mm Hg more than half of the time, please schedule an appointment with us for a recheck.    If you have any questions or concerns, please feel free to call us at [phone]    Sincerely,    Wily Person PA-C    Did you know?      You can schedule a video visit for follow-up appointments as well as future appointments  for certain conditions.  Please see the below link.     [email or web address]    If you have not already done so,  I encourage you to sign up for [employer] ([email or web address]).  This will allow you to review your results, securely communicate with a provider, and schedule virtual visits as well.

## 2022-03-30 NOTE — TELEPHONE ENCOUNTER
Spouse calling.  Patient seen today and 2 scripts for antibiotics sent in, should patient be taking both antibiotics or just one of them?  Augmentin sent and  Zithromax sent.    Which should patient take?    Please advise,  Kita Palmer RN  [facility]

## 2022-03-30 NOTE — TELEPHONE ENCOUNTER
Spouse notified of below and voiced understanding and agreement.  This was copied from confidential notes.  Kita Palmer RN  [facility]        Yovany Person PA JP    3/30/22 4:15 PM  Unsigned Note  He should be taking both antibiotics at the same time as he has a clear pneumonia on CXR. This is a safer combination than taking the other option, Levaquin. please call with update.          Kita Palmer RN  [facility]
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
## 2022-03-31 LAB — SARS-COV-2 RNA RESP QL NAA+PROBE: NEGATIVE

## 2022-04-18 RX ORDER — ALBUTEROL SULFATE 90 UG/1
1-2 AEROSOL, METERED RESPIRATORY (INHALATION) EVERY 4 HOURS PRN
Qty: 6.7 G | Refills: 0 | Status: CANCELLED | OUTPATIENT
Start: 2022-04-18

## 2022-04-18 RX ORDER — AMLODIPINE BESYLATE 5 MG/1
5 TABLET ORAL DAILY
Qty: 90 TABLET | Refills: 3 | Status: CANCELLED | OUTPATIENT
Start: 2022-04-18

## 2022-04-20 ENCOUNTER — OFFICE VISIT (OUTPATIENT)
Dept: FAMILY MEDICINE | Facility: CLINIC | Age: 72
End: 2022-04-20
Payer: COMMERCIAL

## 2022-04-20 VITALS
TEMPERATURE: 97.3 F | HEART RATE: 78 BPM | BODY MASS INDEX: 27.86 KG/M2 | SYSTOLIC BLOOD PRESSURE: 149 MMHG | RESPIRATION RATE: 18 BRPM | WEIGHT: 199 LBS | OXYGEN SATURATION: 96 % | DIASTOLIC BLOOD PRESSURE: 88 MMHG | HEIGHT: 71 IN

## 2022-04-20 DIAGNOSIS — R06.2 WHEEZING: ICD-10-CM

## 2022-04-20 DIAGNOSIS — R05.9 COUGH: ICD-10-CM

## 2022-04-20 DIAGNOSIS — E78.00 HYPERCHOLESTEREMIA: ICD-10-CM

## 2022-04-20 DIAGNOSIS — N40.0 BENIGN NON-NODULAR PROSTATIC HYPERPLASIA WITHOUT LOWER URINARY TRACT SYMPTOMS: ICD-10-CM

## 2022-04-20 DIAGNOSIS — I10 HYPERTENSION GOAL BP (BLOOD PRESSURE) < 140/90: ICD-10-CM

## 2022-04-20 PROCEDURE — 99214 OFFICE O/P EST MOD 30 MIN: CPT | Performed by: FAMILY MEDICINE

## 2022-04-20 RX ORDER — SIMVASTATIN 20 MG
20 TABLET ORAL AT BEDTIME
Qty: 90 TABLET | Refills: 3 | Status: SHIPPED | OUTPATIENT
Start: 2022-04-20 | End: 2022-08-19

## 2022-04-20 RX ORDER — AMLODIPINE BESYLATE 10 MG/1
10 TABLET ORAL DAILY
Qty: 90 TABLET | Refills: 1 | Status: SHIPPED | OUTPATIENT
Start: 2022-04-20 | End: 2022-08-12

## 2022-04-20 RX ORDER — TAMSULOSIN HYDROCHLORIDE 0.4 MG/1
0.4 CAPSULE ORAL DAILY
Qty: 90 CAPSULE | Refills: 3 | Status: SHIPPED | OUTPATIENT
Start: 2022-04-20 | End: 2022-07-05

## 2022-04-20 ASSESSMENT — PAIN SCALES - GENERAL: PAINLEVEL: NO PAIN (0)

## 2022-04-20 NOTE — PROGRESS NOTES
SUBJECTIVE:  71 year oldyear old male enters with  hypertension.  Pt. Has been compliant with medications and medications were reviewed.  No side effects. No chest pain or sob. Low sodium diet.    Current Outpatient Medications:      amLODIPine (NORVASC) 5 MG tablet, TAKE 1 TABLET DAILY, Disp: 90 tablet, Rfl: 0     finasteride (PROSCAR) 5 MG tablet, Take 1 tablet (5 mg) by mouth daily, Disp: 90 tablet, Rfl: 1     hydrochlorothiazide (HYDRODIURIL) 25 MG tablet, Take 1 tablet (25 mg) by mouth daily, Disp: 90 tablet, Rfl: 3     multivitamin w/minerals (THERA-VIT-M) tablet, Take 1 tablet by mouth daily., Disp: , Rfl:      simvastatin (ZOCOR) 20 MG tablet, TAKE 1 TABLET AT BEDTIME, Disp: 90 tablet, Rfl: 0     tamsulosin (FLOMAX) 0.4 MG capsule, TAKE 1 CAPSULE DAILY, Disp: 90 capsule, Rfl: 0     albuterol (PROAIR HFA/PROVENTIL HFA/VENTOLIN HFA) 108 (90 Base) MCG/ACT inhaler, Inhale 1-2 puffs into the lungs every 4 hours as needed for shortness of breath / dyspnea or wheezing (Patient not taking: Reported on 4/20/2022), Disp: 6.7 g, Rfl: 0  Past Medical History:   Diagnosis Date     HTN (hypertension)      Hypercholesteremia      Leucopenia      Office Visit on 03/30/2022   Component Date Value Ref Range Status     Influenza A antigen 03/30/2022 Negative  Negative Final     Influenza B antigen 03/30/2022 Negative  Negative Final     SARS CoV2 PCR 03/30/2022 Negative  Negative, Testing sent to reference lab. Results will be returned via unsolicited result Final    NEGATIVE: SARS-CoV-2 (COVID-19) RNA not detected, presumed negative.      Reviewed health maintenance  Patient Active Problem List   Diagnosis     HYPERLIPIDEMIA LDL GOAL <130     Hypertension goal BP (blood pressure) < 140/90     Advanced directives, counseling/discussion     Benign non-nodular prostatic hyperplasia without lower urinary tract symptoms     Impacted cerumen of left ear         OBJECTIVE:  no apparent distress  BP (!) 149/88   Pulse 78   Temp  "97.3  F (36.3  C) (Tympanic)   Resp 18   Ht 1.803 m (5' 11\")   Wt 90.3 kg (199 lb)   SpO2 96%   BMI 27.75 kg/m       Head: Normocephalic. No masses, lesions, tenderness or abnormalities.  Neck::Neck supple. No adenopathy. Thyroid symmetric, normal size.    Cardiovascular: negative. No lifts, heaves, or thrills. RRR. No murmurs, clicks gallops or rubs  Respiratory. Good diaphragmatic excursion. Lungs clear  Gastrointestinal:Abdomen soft, non-tender.  No masses, organomegaly    Office Visit on 03/30/2022   Component Date Value Ref Range Status     Influenza A antigen 03/30/2022 Negative  Negative Final     Influenza B antigen 03/30/2022 Negative  Negative Final     SARS CoV2 PCR 03/30/2022 Negative  Negative, Testing sent to reference lab. Results will be returned via unsolicited result Final    NEGATIVE: SARS-CoV-2 (COVID-19) RNA not detected, presumed negative.         ICD-10-CM    1. Cough  R05.9    2. Wheezing  R06.2    3. Hypertension goal BP (blood pressure) < 140/90  I10    4. Hypercholesteremia  E78.00    5. Benign non-nodular prostatic hyperplasia without lower urinary tract symptoms  N40.0     PLAN: see above      Answers for HPI/ROS submitted by the patient on 4/20/2022  Do you check your blood pressure regularly outside of the clinic?: Yes  Are your blood pressures ever more than 140 on the top number (systolic) OR more than 90 on the bottom number (diastolic)? (For example, greater than 140/90): Yes  Are you following a low salt diet?: No  How many servings of fruits and vegetables do you eat daily?: 2-3  On average, how many sweetened beverages do you drink each day (Examples: soda, juice, sweet tea, etc.  Do NOT count diet or artificially sweetened beverages)?: 1  How many minutes a day do you exercise enough to make your heart beat faster?: 10 to 19  How many days per week do you miss taking your medication?: 0      "

## 2022-04-20 NOTE — PROGRESS NOTES
"SUBJECTIVE:  71 year old enters for recheck of high cholesterol.  Pt. Has been taking med and has no side effects. Pt is following diet.  Denies chest pain and SOB.  Past Medical History:   Diagnosis Date     HTN (hypertension)      Hypercholesteremia      Leucopenia      Past Surgical History:   Procedure Laterality Date     NO HISTORY OF SURGERY         Current Outpatient Medications:      amLODIPine (NORVASC) 5 MG tablet, TAKE 1 TABLET DAILY, Disp: 90 tablet, Rfl: 0     finasteride (PROSCAR) 5 MG tablet, Take 1 tablet (5 mg) by mouth daily, Disp: 90 tablet, Rfl: 1     hydrochlorothiazide (HYDRODIURIL) 25 MG tablet, Take 1 tablet (25 mg) by mouth daily, Disp: 90 tablet, Rfl: 3     multivitamin w/minerals (THERA-VIT-M) tablet, Take 1 tablet by mouth daily., Disp: , Rfl:      simvastatin (ZOCOR) 20 MG tablet, TAKE 1 TABLET AT BEDTIME, Disp: 90 tablet, Rfl: 0     tamsulosin (FLOMAX) 0.4 MG capsule, TAKE 1 CAPSULE DAILY, Disp: 90 capsule, Rfl: 0     albuterol (PROAIR HFA/PROVENTIL HFA/VENTOLIN HFA) 108 (90 Base) MCG/ACT inhaler, Inhale 1-2 puffs into the lungs every 4 hours as needed for shortness of breath / dyspnea or wheezing (Patient not taking: Reported on 4/20/2022), Disp: 6.7 g, Rfl: 0  Reviewed health maintenance   Patient Active Problem List   Diagnosis     HYPERLIPIDEMIA LDL GOAL <130     Hypertension goal BP (blood pressure) < 140/90     Advanced directives, counseling/discussion     Benign non-nodular prostatic hyperplasia without lower urinary tract symptoms     Impacted cerumen of left ear       OBJECTIVE:  no apparent distress  BP (!) 149/88   Pulse 78   Temp 97.3  F (36.3  C) (Tympanic)   Resp 18   Ht 1.803 m (5' 11\")   Wt 90.3 kg (199 lb)   SpO2 96%   BMI 27.75 kg/m        Exam:  Constitutional: healthy, alert and no distress  Head: Normocephalic. No masses, lesions, tenderness or abnormalities  Neck: Neck supple. No adenopathy. Thyroid symmetric, normal size,  Cardiovascular: negative, PMI " normal. No lifts, heaves, or thrills. RRR. No murmurs, clicks gallops or rub  Respiratory: negative Lungs clear    Office Visit on 03/30/2022   Component Date Value Ref Range Status     Influenza A antigen 03/30/2022 Negative  Negative Final     Influenza B antigen 03/30/2022 Negative  Negative Final     SARS CoV2 PCR 03/30/2022 Negative  Negative, Testing sent to reference lab. Results will be returned via unsolicited result Final    NEGATIVE: SARS-CoV-2 (COVID-19) RNA not detected, presumed negative.     SUBJECTIVE:  71 year oldyear old male enters with  hypertension.  Pt. Has been compliant with medications and medications were reviewed.  No side effects. No chest pain or sob. Low sodium diet.    Current Outpatient Medications:      amLODIPine (NORVASC) 5 MG tablet, TAKE 1 TABLET DAILY, Disp: 90 tablet, Rfl: 0     finasteride (PROSCAR) 5 MG tablet, Take 1 tablet (5 mg) by mouth daily, Disp: 90 tablet, Rfl: 1     hydrochlorothiazide (HYDRODIURIL) 25 MG tablet, Take 1 tablet (25 mg) by mouth daily, Disp: 90 tablet, Rfl: 3     multivitamin w/minerals (THERA-VIT-M) tablet, Take 1 tablet by mouth daily., Disp: , Rfl:      simvastatin (ZOCOR) 20 MG tablet, TAKE 1 TABLET AT BEDTIME, Disp: 90 tablet, Rfl: 0     tamsulosin (FLOMAX) 0.4 MG capsule, TAKE 1 CAPSULE DAILY, Disp: 90 capsule, Rfl: 0     albuterol (PROAIR HFA/PROVENTIL HFA/VENTOLIN HFA) 108 (90 Base) MCG/ACT inhaler, Inhale 1-2 puffs into the lungs every 4 hours as needed for shortness of breath / dyspnea or wheezing (Patient not taking: Reported on 4/20/2022), Disp: 6.7 g, Rfl: 0  Past Medical History:   Diagnosis Date     HTN (hypertension)      Hypercholesteremia      Leucopenia      Office Visit on 03/30/2022   Component Date Value Ref Range Status     Influenza A antigen 03/30/2022 Negative  Negative Final     Influenza B antigen 03/30/2022 Negative  Negative Final     SARS CoV2 PCR 03/30/2022 Negative  Negative, Testing sent to reference lab. Results will  "be returned via unsolicited result Final    NEGATIVE: SARS-CoV-2 (COVID-19) RNA not detected, presumed negative.      Reviewed health maintenance  Patient Active Problem List   Diagnosis     HYPERLIPIDEMIA LDL GOAL <130     Hypertension goal BP (blood pressure) < 140/90     Advanced directives, counseling/discussion     Benign non-nodular prostatic hyperplasia without lower urinary tract symptoms     Impacted cerumen of left ear         OBJECTIVE:  no apparent distress  BP (!) 149/88   Pulse 78   Temp 97.3  F (36.3  C) (Tympanic)   Resp 18   Ht 1.803 m (5' 11\")   Wt 90.3 kg (199 lb)   SpO2 96%   BMI 27.75 kg/m       Head: Normocephalic. No masses, lesions, tenderness or abnormalities.  Neck::Neck supple. No adenopathy. Thyroid symmetric, normal size.    Cardiovascular: negative. No lifts, heaves, or thrills. RRR. No murmurs, clicks gallops or rubs  Respiratory. Good diaphragmatic excursion. Lungs clear  Gastrointestinal:Abdomen soft, non-tender.  No masses, organomegaly    Office Visit on 03/30/2022   Component Date Value Ref Range Status     Influenza A antigen 03/30/2022 Negative  Negative Final     Influenza B antigen 03/30/2022 Negative  Negative Final     SARS CoV2 PCR 03/30/2022 Negative  Negative, Testing sent to reference lab. Results will be returned via unsolicited result Final    NEGATIVE: SARS-CoV-2 (COVID-19) RNA not detected, presumed negative.         ICD-10-CM    1. Cough  R05.9    2. Wheezing  R06.2    3. Hypertension goal BP (blood pressure) < 140/90  I10    4. Hypercholesteremia  E78.00    5. Benign non-nodular prostatic hyperplasia without lower urinary tract symptoms  N40.0     PLAN: monitor blood pressure at home  If normal blood pressure then continue and recheck in 6 months  If elevated then add lisinopril  Get blood pressure at pharmacy here when controlled         Answers for HPI/ROS submitted by the patient on 4/20/2022  Do you check your blood pressure regularly outside of the " clinic?: Yes  Are your blood pressures ever more than 140 on the top number (systolic) OR more than 90 on the bottom number (diastolic)? (For example, greater than 140/90): Yes  Are you following a low salt diet?: No  How many servings of fruits and vegetables do you eat daily?: 2-3  On average, how many sweetened beverages do you drink each day (Examples: soda, juice, sweet tea, etc.  Do NOT count diet or artificially sweetened beverages)?: 1  How many minutes a day do you exercise enough to make your heart beat faster?: 10 to 19  How many days per week do you miss taking your medication?: 0

## 2022-05-06 ENCOUNTER — OFFICE VISIT (OUTPATIENT)
Dept: FAMILY MEDICINE | Facility: CLINIC | Age: 72
End: 2022-05-06
Payer: COMMERCIAL

## 2022-05-06 ENCOUNTER — ANCILLARY PROCEDURE (OUTPATIENT)
Dept: CARDIOLOGY | Facility: CLINIC | Age: 72
End: 2022-05-06
Attending: PHYSICIAN ASSISTANT
Payer: COMMERCIAL

## 2022-05-06 ENCOUNTER — TELEPHONE (OUTPATIENT)
Dept: FAMILY MEDICINE | Facility: CLINIC | Age: 72
End: 2022-05-06

## 2022-05-06 VITALS
HEIGHT: 71 IN | BODY MASS INDEX: 27.86 KG/M2 | HEART RATE: 77 BPM | OXYGEN SATURATION: 97 % | TEMPERATURE: 97.2 F | SYSTOLIC BLOOD PRESSURE: 136 MMHG | WEIGHT: 199 LBS | DIASTOLIC BLOOD PRESSURE: 77 MMHG | RESPIRATION RATE: 18 BRPM

## 2022-05-06 DIAGNOSIS — R05.9 COUGH: Primary | ICD-10-CM

## 2022-05-06 DIAGNOSIS — R60.9 EDEMA, UNSPECIFIED TYPE: ICD-10-CM

## 2022-05-06 DIAGNOSIS — R05.9 COUGH: ICD-10-CM

## 2022-05-06 DIAGNOSIS — R06.01 ORTHOPNEA: ICD-10-CM

## 2022-05-06 DIAGNOSIS — J18.9 PNEUMONIA OF RIGHT LOWER LOBE DUE TO INFECTIOUS ORGANISM: Primary | ICD-10-CM

## 2022-05-06 LAB — LVEF ECHO: NORMAL

## 2022-05-06 PROCEDURE — U0005 INFEC AGEN DETEC AMPLI PROBE: HCPCS | Performed by: PHYSICIAN ASSISTANT

## 2022-05-06 PROCEDURE — 93000 ELECTROCARDIOGRAM COMPLETE: CPT | Mod: 76 | Performed by: PHYSICIAN ASSISTANT

## 2022-05-06 PROCEDURE — U0003 INFECTIOUS AGENT DETECTION BY NUCLEIC ACID (DNA OR RNA); SEVERE ACUTE RESPIRATORY SYNDROME CORONAVIRUS 2 (SARS-COV-2) (CORONAVIRUS DISEASE [COVID-19]), AMPLIFIED PROBE TECHNIQUE, MAKING USE OF HIGH THROUGHPUT TECHNOLOGIES AS DESCRIBED BY CMS-2020-01-R: HCPCS | Performed by: PHYSICIAN ASSISTANT

## 2022-05-06 PROCEDURE — 93306 TTE W/DOPPLER COMPLETE: CPT | Performed by: INTERNAL MEDICINE

## 2022-05-06 PROCEDURE — 99214 OFFICE O/P EST MOD 30 MIN: CPT | Mod: CS | Performed by: PHYSICIAN ASSISTANT

## 2022-05-06 RX ORDER — DOXYCYCLINE 100 MG/1
100 CAPSULE ORAL 2 TIMES DAILY
Qty: 14 CAPSULE | Refills: 0 | Status: SHIPPED | OUTPATIENT
Start: 2022-05-06 | End: 2022-05-11

## 2022-05-06 RX ORDER — PREDNISONE 20 MG/1
40 TABLET ORAL DAILY
Qty: 10 TABLET | Refills: 0 | Status: SHIPPED | OUTPATIENT
Start: 2022-05-06 | End: 2022-05-11

## 2022-05-06 ASSESSMENT — PAIN SCALES - GENERAL: PAINLEVEL: NO PAIN (0)

## 2022-05-06 NOTE — TELEPHONE ENCOUNTER
Routing to Magda Rios    Patient wife call wondering about prescription now that echo.    Patient seen this AM in clinic        RN received call from patients wife.  Consent to communicate on file.    Patients wife updating that Echo had been completed and wondering if prescription could be called in.    Dom Vera, RN, BSN, PHN  [facility]

## 2022-05-06 NOTE — PROGRESS NOTES
Assessment & Plan     Cough  Orthopnea  Edema, unspecified type  Patient is a 71-year-old male who presents to clinic due to return of cough and a gurgling feeling in lungs when lying down.  Patient notes orthopnea.  He had similar symptoms approximately 1.5 months ago and was treated for pneumonia with antibiotics and steroids.  He notes symptoms slowly improved and have now returned.  Vital signs normal.  Physical exam significant for crackles at bilateral lung bases   As well as 1+ pitting edema to bilateral lower extremities which patient notes has been present for 1 year.  Low suspicion for cardiac ischemia as patient denies current or prior chest pain.  Differential diagnosis includes pneumonia, heart failure, mass, viral URI.  COVID-19 PCR in process.  Per x-ray, pneumonia seems to be resolving.  Additionally, lower suspicion for pneumonia at this time as no other URI symptoms or fevers.  X-ray negative for mass, but is concerning for possible heart failure given slight enlargement in cardiac silhouette when compared to x-ray completed in 2019.  Additionally, patient has orthopnea, crackles at bilateral lung bases, and lower extremity edema.  EKG completed and without arrhythmia.  Will complete echocardiogram today for further evaluation.  Discussed symptoms that warrant urgent/emergent follow-up as well as return precautions.  - Symptomatic; Unknown COVID-19 Virus (Coronavirus) by PCR Nose  - XR Chest 2 Views; Future  - EKG 12-lead complete w/read - Clinics  - Echocardiogram Complete; Future  - EKG 12-lead complete w/read - Clinics      See Patient Instructions    Return in about 1 week (around 5/13/2022), or if symptoms worsen or fail to improve.    MOHSEN Smith [facility] BARRY Meza is a 71 year old who presents for the following health issues     HPI     Acute Illness  Acute illness concerns: Cough. Patient notes a gurgling feeling in the lungs when he is lying  "down at night. Dyspnea is only with lying down. Patient notes similar symptoms in the past that cleared up successfully with medication-Prednisone and antibiotics-azithromycin and augmentin. Chest xray did show PNA at that time. No smoking history. Swelling of legs ongoing for around 1 year-no swelling in morning.   Onset/Duration: 1 week  Symptoms:  Fever: no  Chills/Sweats: no  Headache (location?): no  Sinus Pressure: no  Conjunctivitis:  no  Ear Pain: no  Rhinorrhea: no  Congestion: no  Sore Throat: no  Cough: YES-productive of clear sputum  Wheeze: YES  Decreased Appetite: no  Nausea: no  Vomiting: no  Diarrhea: no  Dysuria/Freq.: no  Dysuria or Hematuria: no  Fatigue/Achiness: YES  Sick/Strep Exposure: no  Therapies tried and outcome: Cough drops          Objective    /77   Pulse 77   Temp 97.2  F (36.2  C) (Tympanic)   Resp 18   Ht 1.803 m (5' 11\")   Wt 90.3 kg (199 lb)   SpO2 97%   BMI 27.75 kg/m    Body mass index is 27.75 kg/m .  Physical Exam  Vitals and nursing note reviewed.   Constitutional:       General: He is not in acute distress.     Appearance: Normal appearance.   HENT:      Head: Normocephalic and atraumatic.   Eyes:      Extraocular Movements: Extraocular movements intact.      Pupils: Pupils are equal, round, and reactive to light.   Cardiovascular:      Rate and Rhythm: Normal rate and regular rhythm.      Heart sounds: Normal heart sounds.   Pulmonary:      Effort: Pulmonary effort is normal. No respiratory distress.      Breath sounds: Rales (Bilateral lung bases) present. No wheezing or rhonchi.   Musculoskeletal:         General: Normal range of motion.      Cervical back: Normal range of motion.      Right lower leg: Edema (1+) present.      Left lower leg: Edema (1+) present.   Skin:     General: Skin is warm and dry.   Neurological:      General: No focal deficit present.      Mental Status: He is alert.   Psychiatric:         Mood and Affect: Mood normal.         " Behavior: Behavior normal.            Chest x-ray, 2 views: Per my interpretation, right lower lobe infiltrate still present, but improving.  When compared to chest x-ray completed 6/25/2019, cardiac silhouette mildly enlarged.

## 2022-05-06 NOTE — PATIENT INSTRUCTIONS
Your chest x-ray looks similar to your x-ray 3 months ago.  When compared to your x-ray from September, your heart shadow looks slightly larger.  I am concerned that your symptoms may be due to your heart not pumping as well as it should.  For further evaluation we are completing EKG and echocardiogram.  We will contact you with results and next steps.    If you develop any severe symptoms such as severe swelling of the legs, persistent shortness of breath, or chest pain, please go to the emergency department.  Reach out any questions or concerns.

## 2022-05-06 NOTE — TELEPHONE ENCOUNTER
Echocardiogram and treatment discussed with patient.  See results note from echocardiogram.    Magda Rios PA-C on 5/6/2022 at 3:19 PM

## 2022-05-07 LAB — SARS-COV-2 RNA RESP QL NAA+PROBE: NEGATIVE

## 2022-05-11 ENCOUNTER — TELEPHONE (OUTPATIENT)
Dept: FAMILY MEDICINE | Facility: CLINIC | Age: 72
End: 2022-05-11
Payer: COMMERCIAL

## 2022-05-11 DIAGNOSIS — R05.9 COUGH: Primary | ICD-10-CM

## 2022-05-11 DIAGNOSIS — J18.9 PNEUMONIA OF RIGHT LOWER LOBE DUE TO INFECTIOUS ORGANISM: ICD-10-CM

## 2022-05-11 RX ORDER — PREDNISONE 20 MG/1
40 TABLET ORAL DAILY
Qty: 10 TABLET | Refills: 0 | Status: SHIPPED | OUTPATIENT
Start: 2022-05-11 | End: 2022-07-05

## 2022-05-11 RX ORDER — DOXYCYCLINE 100 MG/1
100 CAPSULE ORAL 2 TIMES DAILY
Qty: 14 CAPSULE | Refills: 0 | Status: SHIPPED | OUTPATIENT
Start: 2022-05-11 | End: 2022-07-05

## 2022-05-11 NOTE — TELEPHONE ENCOUNTER
Refills sent   chest x-ray  To be repeated end of June and if not clear will get Ct scan of chest

## 2022-05-11 NOTE — TELEPHONE ENCOUNTER
Patient's wife is calling. He was seen in urgent care last week for pneumonia. Patient is laving out of town to [place] for 3 weeks on business. She is asking if you can prescribe more Amoxacillin, prednisone and another medication he got, just in case the pneumonia comes back, while he is in [place].India Grajeda MA/TC

## 2022-06-29 ENCOUNTER — MYC MEDICAL ADVICE (OUTPATIENT)
Dept: FAMILY MEDICINE | Facility: CLINIC | Age: 72
End: 2022-06-29

## 2022-06-29 ENCOUNTER — OFFICE VISIT (OUTPATIENT)
Dept: FAMILY MEDICINE | Facility: CLINIC | Age: 72
End: 2022-06-29
Payer: COMMERCIAL

## 2022-06-29 VITALS
HEIGHT: 71 IN | SYSTOLIC BLOOD PRESSURE: 135 MMHG | DIASTOLIC BLOOD PRESSURE: 60 MMHG | RESPIRATION RATE: 18 BRPM | BODY MASS INDEX: 28.72 KG/M2 | OXYGEN SATURATION: 98 % | HEART RATE: 70 BPM | TEMPERATURE: 98.2 F | WEIGHT: 205.13 LBS

## 2022-06-29 DIAGNOSIS — R05.9 COUGH: Primary | ICD-10-CM

## 2022-06-29 DIAGNOSIS — I49.9 IRREGULAR HEARTBEAT: ICD-10-CM

## 2022-06-29 DIAGNOSIS — K45.8 INTERNAL HERNIA: ICD-10-CM

## 2022-06-29 PROCEDURE — 93000 ELECTROCARDIOGRAM COMPLETE: CPT | Performed by: FAMILY MEDICINE

## 2022-06-29 PROCEDURE — 99214 OFFICE O/P EST MOD 30 MIN: CPT | Performed by: FAMILY MEDICINE

## 2022-06-29 ASSESSMENT — PAIN SCALES - GENERAL: PAINLEVEL: NO PAIN (0)

## 2022-06-29 NOTE — PROGRESS NOTES
"SUBJECTIVE:  71 year old.The patient has a complaint of cough .  This started one month ago.  quality slightly productive Associated symptoms are none.  Brought on by supine .  Better with time. ROS  No fever chills     Rectal hemorrhoids that has pushed back in when it pops out. Non tender and  No  Bleeding     Reviewed health maintenance  Patient Active Problem List   Diagnosis     HYPERLIPIDEMIA LDL GOAL <130     Hypertension goal BP (blood pressure) < 140/90     Advanced directives, counseling/discussion     Benign non-nodular prostatic hyperplasia without lower urinary tract symptoms     Impacted cerumen of left ear     Past Medical History:   Diagnosis Date     HTN (hypertension)      Hypercholesteremia      Leucopenia        OBJECTIVE:  no apparent distress  /60   Pulse 70   Temp 98.2  F (36.8  C) (Tympanic)   Resp 18   Ht 1.803 m (5' 11\")   Wt 93 kg (205 lb 2 oz)   SpO2 98%   BMI 28.61 kg/m      LUNGS:  CTA B/L, no wheezing or crackles.   Cardiovascular: negative, PMI normal. No lifts, heaves, or thrills. regular heart beat with extra beat   No murmurs, clicks gallops or rub   Gastrointestinal: Abdomen soft, non-tender. BS normal. No masses,  organomegaly       ICD-10-CM    1. Cough  R05.9    2. Irregular heartbeat  I49.9 EKG 12-lead complete w/read - Clinics    PLAN: await Zio      Answers for HPI/ROS submitted by the patient on 6/29/2022  What is the reason for your visit today? : Chronic cough post pneumonia. Feeling tired and weakness.  Possible hemorrhoid problem to be addressed.  How many servings of fruits and vegetables do you eat daily?: 2-3  On average, how many sweetened beverages do you drink each day (Examples: soda, juice, sweet tea, etc.  Do NOT count diet or artificially sweetened beverages)?: 0  How many minutes a day do you exercise enough to make your heart beat faster?: 9 or less  How many days a week do you exercise enough to make your heart beat faster?: 3 or less  How many " days per week do you miss taking your medication?: 0

## 2022-07-05 ENCOUNTER — VIRTUAL VISIT (OUTPATIENT)
Dept: FAMILY MEDICINE | Facility: CLINIC | Age: 72
End: 2022-07-05

## 2022-07-05 ENCOUNTER — OFFICE VISIT (OUTPATIENT)
Dept: SURGERY | Facility: CLINIC | Age: 72
End: 2022-07-05
Attending: FAMILY MEDICINE
Payer: COMMERCIAL

## 2022-07-05 VITALS
DIASTOLIC BLOOD PRESSURE: 77 MMHG | HEART RATE: 70 BPM | BODY MASS INDEX: 28.03 KG/M2 | SYSTOLIC BLOOD PRESSURE: 150 MMHG | WEIGHT: 201 LBS

## 2022-07-05 DIAGNOSIS — J20.9 ACUTE BRONCHITIS, UNSPECIFIED ORGANISM: Primary | ICD-10-CM

## 2022-07-05 DIAGNOSIS — K64.8 INTERNAL HEMORRHOID: ICD-10-CM

## 2022-07-05 PROCEDURE — 99213 OFFICE O/P EST LOW 20 MIN: CPT | Mod: 95 | Performed by: FAMILY MEDICINE

## 2022-07-05 PROCEDURE — 99203 OFFICE O/P NEW LOW 30 MIN: CPT | Performed by: SURGERY

## 2022-07-05 RX ORDER — AZITHROMYCIN 250 MG/1
TABLET, FILM COATED ORAL
Qty: 6 TABLET | Refills: 0 | Status: SHIPPED | OUTPATIENT
Start: 2022-07-05 | End: 2022-07-08

## 2022-07-05 ASSESSMENT — ENCOUNTER SYMPTOMS: COUGH: 1

## 2022-07-05 NOTE — PROGRESS NOTES
"Derrick is a 71 year old who is being evaluated via a billable video visit.      How would you like to obtain your AVS? MyChart  If the video visit is dropped, the invitation should be resent by: Send to e-mail at: [email or web address]  Will anyone else be joining your video visit? No      Assessment & Plan   Acute bronchitis, unspecified organism  Patient already did course of Augmentin and doxycycline.  He was moderately better.  Desiring a azithromycin course as he has found that helpful in the past.  Did try some prednisone but caused edema, also tried Zyrtec which did not help his cough.  No significant acid reflux trigger type symptoms he feels.  No fevers.  We will try Zithromax course and follow-up in 2 weeks if not improving.  May need to see pulmonary.  - azithromycin (ZITHROMAX) 250 MG tablet  Dispense: 6 tablet; Refill: 0      BMI:   Estimated body mass index is 28.03 kg/m  as calculated from the following:    Height as of 6/29/22: 1.803 m (5' 11\").    Weight as of an earlier encounter on 7/5/22: 91.2 kg (201 lb).   Weight management plan: Discussed healthy diet and exercise guidelines      Return in about 2 weeks (around 7/19/2022) for symptoms failing to improve or sooner if worsening.      Miguel Montes MD      [address] [address]  [employer]   Office: [phone]       Subjective   Derrick is a 71 year old, presenting for the following health issues:  Cough    Reason for visit:  Chronic cough post pneumonia. Feeling tired and weakness.      Acute Illness  Acute illness concerns: Cough  Onset/Duration: x 3 weeks  Symptoms:  Fever: No  Chills/Sweats: No  Headache (location?): No  Sinus Pressure: No  Conjunctivitis:  No  Ear Pain: no  Rhinorrhea: No  Congestion: YES  Sore Throat: YES  Cough: YES-non-productive, productive of green sputum, barking, waxing and waning over time  Wheeze: No  Decreased Appetite: No  Nausea: No  Vomiting: " No  Diarrhea: No  Dysuria/Freq.: No  Dysuria or Hematuria: No  Fatigue/Achiness: No  Sick/Strep Exposure: No  Therapies tried and outcome: [place] Antibiotics and steroids, patient felt better, but returned with this cough about 3 weeks later.      Possible hemorrhoid problem to be addressed.    He eats 2-3 servings of fruits and vegetables daily.He consumes 0 sweetened beverage(s) daily.He exercises with enough effort to increase his heart rate 9 or less minutes per day.  He exercises with enough effort to increase his heart rate 3 or less days per week.     He is taking medications regularly.     No known allergies or asthma.-Tried Zyrtec without much change in symptoms      Review of Systems   Respiratory: Positive for cough.       Constitutional, HEENT, cardiovascular, pulmonary, gi and gu systems are negative, except as otherwise noted.      Objective         Vitals:  No vitals were obtained today due to virtual visit.    Physical Exam   GENERAL: Healthy, alert and no distress  EYES: Eyes grossly normal to inspection.  No discharge or erythema, or obvious scleral/conjunctival abnormalities.  RESP: No audible wheeze, cough, or visible cyanosis.  No visible retractions or increased work of breathing.    SKIN: Visible skin clear. No significant rash, abnormal pigmentation or lesions.  NEURO: Cranial nerves grossly intact.  Mentation and speech appropriate for age.  PSYCH: Mentation appears normal, affect normal/bright, judgement and insight intact, normal speech and appearance well-groomed.                Video-Visit Details    Video Start Time: 3 PM    Type of service:  Video Visit    Video End Time:3:20 PM    Originating Location (pt. Location): Home    Distant Location (provider location):  [facility]     Platform used for Video Visit: [employer]

## 2022-07-05 NOTE — PROGRESS NOTES
I was asked to see Erick Yusuf regarding rectal bleeding by Dr Rushing    HPI:  Patient is a 71 year old male  with complaints of feeling a mass rectally. He denies any pain, itching, or bleeding. He noticed it about a month ago. He has tried OTC hemorrhoid care and it seems to have helped.   Patient has not family history of colon cancer. He has been doing his colo gaurd testing.       Review Of Systems   ROS: 10 point ROS neg other than the symptoms noted above in the HPI.    BP (!) 150/77   Pulse 70   Wt 91.2 kg (201 lb)   BMI 28.03 kg/m        Past Medical History:   Diagnosis Date     HTN (hypertension)      Hypercholesteremia      Leucopenia        Past Surgical History:   Procedure Laterality Date     NO HISTORY OF SURGERY         Current Outpatient Medications   Medication Sig Dispense Refill     albuterol (PROAIR HFA/PROVENTIL HFA/VENTOLIN HFA) 108 (90 Base) MCG/ACT inhaler Inhale 1-2 puffs into the lungs every 4 hours as needed for shortness of breath / dyspnea or wheezing (Patient not taking: No sig reported) 6.7 g 0     amLODIPine (NORVASC) 10 MG tablet Take 1 tablet (10 mg) by mouth daily 90 tablet 1     amoxicillin-clavulanate (AUGMENTIN) 875-125 MG tablet Take 1 tablet by mouth 2 times daily (Patient not taking: Reported on 6/29/2022) 14 tablet 0     doxycycline hyclate (VIBRAMYCIN) 100 MG capsule Take 1 capsule (100 mg) by mouth 2 times daily (Patient not taking: Reported on 6/29/2022) 14 capsule 0     finasteride (PROSCAR) 5 MG tablet Take 1 tablet (5 mg) by mouth daily 90 tablet 1     hydrochlorothiazide (HYDRODIURIL) 25 MG tablet Take 1 tablet (25 mg) by mouth daily 90 tablet 3     multivitamin w/minerals (THERA-VIT-M) tablet Take 1 tablet by mouth daily.       predniSONE (DELTASONE) 20 MG tablet Take 2 tablets (40 mg) by mouth daily (Patient not taking: Reported on 6/29/2022) 10 tablet 0     simvastatin (ZOCOR) 20 MG tablet Take 1 tablet (20 mg) by mouth At Bedtime 90 tablet 3      tamsulosin (FLOMAX) 0.4 MG capsule Take 1 capsule (0.4 mg) by mouth daily (Patient not taking: Reported on 6/29/2022) 90 capsule 3       Social History     Socioeconomic History     Marital status:      Spouse name: Not on file     Number of children: Not on file     Years of education: Not on file     Highest education level: Not on file   Occupational History     Not on file   Tobacco Use     Smoking status: Never Smoker     Smokeless tobacco: Never Used   Vaping Use     Vaping Use: Never used   Substance and Sexual Activity     Alcohol use: Not Currently     Comment: occasionally     Drug use: No     Sexual activity: Not Currently     Partners: Female   Other Topics Concern     Parent/sibling w/ CABG, MI or angioplasty before 65F 55M? Not Asked   Social History Narrative     Not on file     Social Determinants of Health     Financial Resource Strain: Not on file   Food Insecurity: Not on file   Transportation Needs: Not on file   Physical Activity: Not on file   Stress: Not on file   Social Connections: Not on file   Intimate Partner Violence: Not on file   Housing Stability: Not on file       Physical exam:    BP (!) 150/77   Pulse 70   Wt 91.2 kg (201 lb)   BMI 28.03 kg/m    Constitutional: healthy, alert and no distress  Eyes: Pupils round and equal, no icterus   ENT: Mucous membranes moist  Respiratory:  Non-labored respiration  Gastrointestinal: Abdomen soft, non-tender. No masses, organomegaly  Musculoskeletal: No gross deformity  Neurologic: No gross focal deficits  Psychiatric: mentation appears normal and affect normal/bright  Hematologic/Lymphatic/Immunologic: No bruising noted  Skin: No lesions, rashes, erythema or jaundice noted      Rectal exam:internal hemorrhoid, no signs of bleeding    Chart reviewed    Assessment: Internal hemorrhoid    Discussed hemorrhoid care including fiber supplements (metamucil and flax seed ground), not spending much time on the toilet.  Also discussed anusol HC   and how to use it for swelling and discomfort.  Discussed ways to keep are clean.  I also briefly discussed hemorrhoid surgery and the discomforts with it.  Encouraged patient to do conservative treatment first.    Layo Cevallos D.O.

## 2022-07-05 NOTE — LETTER
7/5/2022         RE: Erick Yusuf  [address]  [place] [address]        Dear Colleague,    Thank you for referring your patient, Erick Yusuf, to the [facility]. Please see a copy of my visit note below.    I was asked to see Erick Yusuf regarding rectal bleeding by Dr Rushing    HPI:  Patient is a 71 year old male  with complaints of feeling a mass rectally. He denies any pain, itching, or bleeding. He noticed it about a month ago. He has tried OTC hemorrhoid care and it seems to have helped.   Patient has not family history of colon cancer. He has been doing his colo gaurd testing.       Review Of Systems   ROS: 10 point ROS neg other than the symptoms noted above in the HPI.    BP (!) 150/77   Pulse 70   Wt 91.2 kg (201 lb)   BMI 28.03 kg/m        Past Medical History:   Diagnosis Date     HTN (hypertension)      Hypercholesteremia      Leucopenia        Past Surgical History:   Procedure Laterality Date     NO HISTORY OF SURGERY         Current Outpatient Medications   Medication Sig Dispense Refill     albuterol (PROAIR HFA/PROVENTIL HFA/VENTOLIN HFA) 108 (90 Base) MCG/ACT inhaler Inhale 1-2 puffs into the lungs every 4 hours as needed for shortness of breath / dyspnea or wheezing (Patient not taking: No sig reported) 6.7 g 0     amLODIPine (NORVASC) 10 MG tablet Take 1 tablet (10 mg) by mouth daily 90 tablet 1     amoxicillin-clavulanate (AUGMENTIN) 875-125 MG tablet Take 1 tablet by mouth 2 times daily (Patient not taking: Reported on 6/29/2022) 14 tablet 0     doxycycline hyclate (VIBRAMYCIN) 100 MG capsule Take 1 capsule (100 mg) by mouth 2 times daily (Patient not taking: Reported on 6/29/2022) 14 capsule 0     finasteride (PROSCAR) 5 MG tablet Take 1 tablet (5 mg) by mouth daily 90 tablet 1     hydrochlorothiazide (HYDRODIURIL) 25 MG tablet Take 1 tablet (25 mg) by mouth daily 90 tablet 3     multivitamin w/minerals (THERA-VIT-M) tablet Take 1  tablet by mouth daily.       predniSONE (DELTASONE) 20 MG tablet Take 2 tablets (40 mg) by mouth daily (Patient not taking: Reported on 6/29/2022) 10 tablet 0     simvastatin (ZOCOR) 20 MG tablet Take 1 tablet (20 mg) by mouth At Bedtime 90 tablet 3     tamsulosin (FLOMAX) 0.4 MG capsule Take 1 capsule (0.4 mg) by mouth daily (Patient not taking: Reported on 6/29/2022) 90 capsule 3       Social History     Socioeconomic History     Marital status:      Spouse name: Not on file     Number of children: Not on file     Years of education: Not on file     Highest education level: Not on file   Occupational History     Not on file   Tobacco Use     Smoking status: Never Smoker     Smokeless tobacco: Never Used   Vaping Use     Vaping Use: Never used   Substance and Sexual Activity     Alcohol use: Not Currently     Comment: occasionally     Drug use: No     Sexual activity: Not Currently     Partners: Female   Other Topics Concern     Parent/sibling w/ CABG, MI or angioplasty before 65F 55M? Not Asked   Social History Narrative     Not on file     Social Determinants of Health     Financial Resource Strain: Not on file   Food Insecurity: Not on file   Transportation Needs: Not on file   Physical Activity: Not on file   Stress: Not on file   Social Connections: Not on file   Intimate Partner Violence: Not on file   Housing Stability: Not on file       Physical exam:    BP (!) 150/77   Pulse 70   Wt 91.2 kg (201 lb)   BMI 28.03 kg/m    Constitutional: healthy, alert and no distress  Eyes: Pupils round and equal, no icterus   ENT: Mucous membranes moist  Respiratory:  Non-labored respiration  Gastrointestinal: Abdomen soft, non-tender. No masses, organomegaly  Musculoskeletal: No gross deformity  Neurologic: No gross focal deficits  Psychiatric: mentation appears normal and affect normal/bright  Hematologic/Lymphatic/Immunologic: No bruising noted  Skin: No lesions, rashes, erythema or jaundice noted      Rectal  exam:internal hemorrhoid, no signs of bleeding    Chart reviewed    Assessment: Internal hemorrhoid    Discussed hemorrhoid care including fiber supplements (metamucil and flax seed ground), not spending much time on the toilet.  Also discussed anusol HC  and how to use it for swelling and discomfort.  Discussed ways to keep are clean.  I also briefly discussed hemorrhoid surgery and the discomforts with it.  Encouraged patient to do conservative treatment first.    Layo Cevallos D.O.        Again, thank you for allowing me to participate in the care of your patient.        Sincerely,        Layo Cevallos, DO

## 2022-07-08 ENCOUNTER — MYC MEDICAL ADVICE (OUTPATIENT)
Dept: FAMILY MEDICINE | Facility: CLINIC | Age: 72
End: 2022-07-08

## 2022-07-08 DIAGNOSIS — J20.9 ACUTE BRONCHITIS, UNSPECIFIED ORGANISM: ICD-10-CM

## 2022-07-08 RX ORDER — AZITHROMYCIN 250 MG/1
TABLET, FILM COATED ORAL
Qty: 6 TABLET | Refills: 0 | Status: SHIPPED | OUTPATIENT
Start: 2022-07-08 | End: 2022-11-11

## 2022-07-08 NOTE — TELEPHONE ENCOUNTER
Reason for Call:  Medication or medication refill:    Do you use a [facility]?  Name of the pharmacy and phone number for the current request:       [facility] [id number] [place] [facility] - [place], [place] - [address]      Name of the medication requested: azithromycin (ZITHROMAX) 250 MG tabletazithromycin (ZITHROMAX) 250 MG tablet    Other request:  Pt states he has a history that he needs to take two courses of this antibotic to have it work.  ALSO the patient is leaving the country on Monday at 6 in the morning so the timing needs to be TODAY.    Can we leave a detailed message on this number? YES    Phone number patient can be reached at: Cell number on file:    Telephone Information:   Mobile [phone]       Best Time: asap    Call taken on 7/8/2022 at 9:31 AM by Mercedes Jordan

## 2022-07-26 ENCOUNTER — MYC MEDICAL ADVICE (OUTPATIENT)
Dept: FAMILY MEDICINE | Facility: CLINIC | Age: 72
End: 2022-07-26

## 2022-07-26 DIAGNOSIS — R22.43 LOCALIZED SWELLING OF BOTH LOWER LEGS: ICD-10-CM

## 2022-07-26 DIAGNOSIS — I49.9 IRREGULAR HEARTBEAT: Primary | ICD-10-CM

## 2022-08-03 ENCOUNTER — PATIENT OUTREACH (OUTPATIENT)
Dept: FAMILY MEDICINE | Facility: CLINIC | Age: 72
End: 2022-08-03

## 2022-08-03 NOTE — TELEPHONE ENCOUNTER
Patient Quality Outreach    Patient is due for the following:   Physical Annual Wellness Visit    Next Steps:   Schedule a yearly physical    Type of outreach:    Sent [employer] message.      Questions for provider review:    None     Kitty Solorio CMA

## 2022-08-11 NOTE — PROGRESS NOTES
[facility]      Referring provider: Sancho Rushing MD    HPI: Mr. Erick Yusuf is a 71 year old  male with PMH significant for    -Hypertension  -Family history of premature CAD    Patient was recently seen in primary care clinic by Dr. Rushing on 6/29/2022.  He was found to have irregular heartbeat on EKG.  Subsequently underwent Zio patch for 2 days which showed frequent PACs (19%), 60 episodes of nonsustained SVT longest lasting 12 seconds.  Patient tells me that he did not feel anything unusual when he was wearing the heart monitor.  Denies chest pain, shortness of breath, palpitations, dizziness or syncope.  Reports lower extremity edema more so over the last 1 year (since he started amlodipine).  Reports frequent cough after travels.    Does not smoke.  Alcohol occasional.  No diabetes.  Reports high blood pressure at home.  Blood pressure is also high in clinic today.  He does not monitor BP very frequently.  He has history of hypertension for over 20 years.  Family history is significant for premature CAD.  His brother had CABG in his 40s.     Echocardiogram on 5/6/2022 shows normal biventricular function, mild LVH, no significant valve disease.    Patient is currently on amlodipine 10 mg, hydrochlorothiazide 25 mg and simvastatin 20 mg.    Medications, personal, family, and social history reviewed with patient and revised.    PAST MEDICAL HISTORY:  Past Medical History:   Diagnosis Date     HTN (hypertension)      Hypercholesteremia      Leucopenia        CURRENT MEDICATIONS:  Current Outpatient Medications   Medication Sig Dispense Refill     amLODIPine (NORVASC) 10 MG tablet Take 1 tablet (10 mg) by mouth daily 90 tablet 1     azithromycin (ZITHROMAX) 250 MG tablet Two tablets first day, then one tablet daily for four days 6 tablet 0     finasteride (PROSCAR) 5 MG tablet Take 1 tablet (5 mg)  by mouth daily 90 tablet 1     hydrochlorothiazide (HYDRODIURIL) 25 MG tablet Take 1 tablet (25 mg) by mouth daily 90 tablet 3     multivitamin w/minerals (THERA-VIT-M) tablet Take 1 tablet by mouth daily.       simvastatin (ZOCOR) 20 MG tablet Take 1 tablet (20 mg) by mouth At Bedtime 90 tablet 3       PAST SURGICAL HISTORY:  Past Surgical History:   Procedure Laterality Date     NO HISTORY OF SURGERY         ALLERGIES:   No Known Allergies    FAMILY HISTORY:  Family History   Problem Relation Age of Onset     Diabetes Father      Heart Disease Brother          SOCIAL HISTORY:  Social History     Tobacco Use     Smoking status: Never Smoker     Smokeless tobacco: Never Used   Vaping Use     Vaping Use: Never used   Substance Use Topics     Alcohol use: Not Currently     Comment: occasionally     Drug use: No       ROS:   Constitutional: No fever, chills, or sweats. Weight stable.   Cardiovascular: As per HPI.       Exam:  BP (!) 157/82 (BP Location: Left arm, Patient Position: Chair, Cuff Size: Adult Large)   Pulse 66   Wt 93.4 kg (206 lb)   SpO2 98%   BMI 28.73 kg/m    GENERAL APPEARANCE: alert and no distress  HEENT: no icterus, no central cyanosis  LYMPH/NECK: no adenopathy, no asymmetry, JVP not elevated, no carotid bruits.  RESPIRATORY: lungs clear to auscultation - no rales, rhonchi or wheezes, no use of accessory muscles, no retractions, respirations are unlabored, normal respiratory rate  CARDIOVASCULAR: regular rhythm, normal S1, S2, no S3 or S4 and no murmur, click or rub, precordium quiet with normal PMI.  GI: soft, non tender  EXTREMITIES: 2+ pretibial edema on both sides.  NEURO: alert, normal speech,and affect  SKIN: no ecchymoses, no rashes     I have reviewed the labs and personally reviewed the imaging below and made my comment in the assessment and plan.    Labs:  CBC RESULTS:   Lab Results   Component Value Date    WBC 4.7 09/17/2015    RBC 4.75 09/17/2015    HGB 14.7 09/17/2015    HCT 42.8  09/17/2015    MCV 90 09/17/2015    MCH 30.9 09/17/2015    MCHC 34.3 09/17/2015    RDW 13.2 09/17/2015     (L) 09/17/2015       BMP RESULTS:  Lab Results   Component Value Date     08/11/2021     03/01/2021    POTASSIUM 3.3 (L) 08/11/2021    POTASSIUM 3.7 03/01/2021    CHLORIDE 106 08/11/2021    CHLORIDE 105 03/01/2021    CO2 26 08/11/2021    CO2 31 03/01/2021    ANIONGAP 7 08/11/2021    ANIONGAP 3 03/01/2021     (H) 08/11/2021     (H) 03/01/2021    BUN 19 08/11/2021    BUN 17 03/01/2021    CR 1.08 08/11/2021    CR 1.10 03/01/2021    GFRESTIMATED 69 08/11/2021    GFRESTIMATED 68 03/01/2021    GFRESTBLACK 78 03/01/2021    ALIZA 9.5 08/11/2021    ALIZA 9.6 03/01/2021 6/29/22: 3 day zio:   SR with 60 runs SVT- longest 13 seconds-rte 101 bpm  PAC- 20% burden  Frequent AT    5/6/22: Echo  Interpretation Summary    Global and regional left ventricular function is normal with an EF of 60-65%.  Left ventricular diastolic function is normal.  Mild concentric wall thickening consistent with left ventricular hypertrophy  is present.  Global right ventricular function is normal.  No significant valvular abnormalities.  No pericardial effusion is present.  Dilation of the inferior vena cava is present with abnormal respiratory  variation in diameter suggests a high RA pressure estimated at 15 mmHg or  greater.    There is no prior study for direct comparison.      EKG 6/29/2022 personally reviewed shows sinus rhythm with occasional PVC.    Assessment and Plan:     #Frequent PACs (19.7% burden as assessed by Zio patch 6/29/2022)  #Nonsustained SVT  #Echocardiogram with mild LVH and normal LVEF, no valve disease  -Patient is asymptomatic from PACs or nonsustained SVT.  Highly likely due to uncontrolled blood pressure.  Recommended adding spironolactone 25 mg to improve blood pressure control I also recommended sleep study to rule out sleep apnea since he reports poor sleep.    #Family history of  premature CAD  -Recommend coronary calcium screening    #Hypertension  #Lower extremity edema likely due to amlodipine  -Recommend to cut down amlodipine from 10 to 5 mg which will likely improve lower extremity swelling.  -Continue hydrochlorothiazide 25 mg  -Start spironolactone 25 mg  -BMP 2 weeks  -Patient was on lisinopril in the past.  He developed cough and switched to current medical regimen.  He still has cough from time to time when he travels.  In the future he might be a candidate for re-trial of ACE inhibitor versus ARB.  -Recommend lifestyle changes like cut down on salt, lose weight and physical activity.  Walk half an hour 5 times a week.    Return to clinic 3 months.    Total time spent today for this visit is 90 minutes including precharting, face-to-face clinic visit, review of labs/imaging and medical documentation.    Please donot hesitate to contact me if you have any questions or concerns. Again, thank you for allowing me to participate in the care of your patient.    Joya GORDON MD  [facility] Division of Cardiology  Pager [phone]

## 2022-08-12 ENCOUNTER — OFFICE VISIT (OUTPATIENT)
Dept: CARDIOLOGY | Facility: CLINIC | Age: 72
End: 2022-08-12
Attending: FAMILY MEDICINE
Payer: COMMERCIAL

## 2022-08-12 VITALS
SYSTOLIC BLOOD PRESSURE: 150 MMHG | DIASTOLIC BLOOD PRESSURE: 84 MMHG | BODY MASS INDEX: 28.73 KG/M2 | OXYGEN SATURATION: 98 % | WEIGHT: 206 LBS | HEART RATE: 66 BPM

## 2022-08-12 DIAGNOSIS — I49.1 PREMATURE ATRIAL CONTRACTION: Primary | ICD-10-CM

## 2022-08-12 DIAGNOSIS — I49.9 IRREGULAR HEARTBEAT: ICD-10-CM

## 2022-08-12 DIAGNOSIS — I10 HYPERTENSION GOAL BP (BLOOD PRESSURE) < 140/90: ICD-10-CM

## 2022-08-12 DIAGNOSIS — Z82.49 FAMILY HISTORY OF CORONARY ARTERY DISEASE: ICD-10-CM

## 2022-08-12 DIAGNOSIS — R22.43 LOCALIZED SWELLING OF BOTH LOWER LEGS: ICD-10-CM

## 2022-08-12 DIAGNOSIS — I49.9 IRREGULAR HEART RHYTHM: ICD-10-CM

## 2022-08-12 PROCEDURE — 99417 PROLNG OP E/M EACH 15 MIN: CPT | Performed by: INTERNAL MEDICINE

## 2022-08-12 PROCEDURE — 99205 OFFICE O/P NEW HI 60 MIN: CPT | Performed by: INTERNAL MEDICINE

## 2022-08-12 RX ORDER — SPIRONOLACTONE 25 MG/1
25 TABLET ORAL DAILY
Qty: 90 TABLET | Refills: 3 | Status: SHIPPED | OUTPATIENT
Start: 2022-08-12 | End: 2023-06-22

## 2022-08-12 RX ORDER — AMLODIPINE BESYLATE 5 MG/1
5 TABLET ORAL DAILY
Qty: 90 TABLET | Refills: 3 | Status: SHIPPED | OUTPATIENT
Start: 2022-08-12 | End: 2023-06-28

## 2022-08-12 RX ORDER — BENZTROPINE MESYLATE 0.5 MG/1
0.5 TABLET ORAL DAILY
COMMUNITY
End: 2022-12-09

## 2022-08-12 NOTE — LETTER
8/12/2022      RE: Erick Yusuf  [address]  [place] [address]       Dear Colleague,    Thank you for the opportunity to participate in the care of your patient, Erick Yusuf, at the [facility]. Please see a copy of my visit note below.                                                                                  General Cardiology Clinic-[place]      Referring provider: Sancho Rushing MD    HPI: Mr. Erick Yusuf is a 71 year old  male with PMH significant for    -Hypertension  -Family history of premature CAD    Patient was recently seen in primary care clinic by Dr. Rushing on 6/29/2022.  He was found to have irregular heartbeat on EKG.  Subsequently underwent Zio patch for 2 days which showed frequent PACs (19%), 60 episodes of nonsustained SVT longest lasting 12 seconds.  Patient tells me that he did not feel anything unusual when he was wearing the heart monitor.  Denies chest pain, shortness of breath, palpitations, dizziness or syncope.  Reports lower extremity edema more so over the last 1 year (since he started amlodipine).  Reports frequent cough after travels.    Does not smoke.  Alcohol occasional.  No diabetes.  Reports high blood pressure at home.  Blood pressure is also high in clinic today.  He does not monitor BP very frequently.  He has history of hypertension for over 20 years.  Family history is significant for premature CAD.  His brother had CABG in his 40s.     Echocardiogram on 5/6/2022 shows normal biventricular function, mild LVH, no significant valve disease.    Patient is currently on amlodipine 10 mg, hydrochlorothiazide 25 mg and simvastatin 20 mg.    Medications, personal, family, and social history reviewed with patient and revised.    PAST MEDICAL HISTORY:  Past Medical History:   Diagnosis Date     HTN (hypertension)      Hypercholesteremia      Leucopenia         CURRENT MEDICATIONS:  Current Outpatient Medications   Medication Sig Dispense Refill     amLODIPine (NORVASC) 10 MG tablet Take 1 tablet (10 mg) by mouth daily 90 tablet 1     azithromycin (ZITHROMAX) 250 MG tablet Two tablets first day, then one tablet daily for four days 6 tablet 0     finasteride (PROSCAR) 5 MG tablet Take 1 tablet (5 mg) by mouth daily 90 tablet 1     hydrochlorothiazide (HYDRODIURIL) 25 MG tablet Take 1 tablet (25 mg) by mouth daily 90 tablet 3     multivitamin w/minerals (THERA-VIT-M) tablet Take 1 tablet by mouth daily.       simvastatin (ZOCOR) 20 MG tablet Take 1 tablet (20 mg) by mouth At Bedtime 90 tablet 3       PAST SURGICAL HISTORY:  Past Surgical History:   Procedure Laterality Date     NO HISTORY OF SURGERY         ALLERGIES:   No Known Allergies    FAMILY HISTORY:  Family History   Problem Relation Age of Onset     Diabetes Father      Heart Disease Brother          SOCIAL HISTORY:  Social History     Tobacco Use     Smoking status: Never Smoker     Smokeless tobacco: Never Used   Vaping Use     Vaping Use: Never used   Substance Use Topics     Alcohol use: Not Currently     Comment: occasionally     Drug use: No       ROS:   Constitutional: No fever, chills, or sweats. Weight stable.   Cardiovascular: As per HPI.       Exam:  BP (!) 157/82 (BP Location: Left arm, Patient Position: Chair, Cuff Size: Adult Large)   Pulse 66   Wt 93.4 kg (206 lb)   SpO2 98%   BMI 28.73 kg/m    GENERAL APPEARANCE: alert and no distress  HEENT: no icterus, no central cyanosis  LYMPH/NECK: no adenopathy, no asymmetry, JVP not elevated, no carotid bruits.  RESPIRATORY: lungs clear to auscultation - no rales, rhonchi or wheezes, no use of accessory muscles, no retractions, respirations are unlabored, normal respiratory rate  CARDIOVASCULAR: regular rhythm, normal S1, S2, no S3 or S4 and no murmur, click or rub, precordium quiet with normal PMI.  GI: soft, non tender  EXTREMITIES: 2+ pretibial  edema on both sides.  NEURO: alert, normal speech,and affect  SKIN: no ecchymoses, no rashes     I have reviewed the labs and personally reviewed the imaging below and made my comment in the assessment and plan.    Labs:  CBC RESULTS:   Lab Results   Component Value Date    WBC 4.7 09/17/2015    RBC 4.75 09/17/2015    HGB 14.7 09/17/2015    HCT 42.8 09/17/2015    MCV 90 09/17/2015    MCH 30.9 09/17/2015    MCHC 34.3 09/17/2015    RDW 13.2 09/17/2015     (L) 09/17/2015       BMP RESULTS:  Lab Results   Component Value Date     08/11/2021     03/01/2021    POTASSIUM 3.3 (L) 08/11/2021    POTASSIUM 3.7 03/01/2021    CHLORIDE 106 08/11/2021    CHLORIDE 105 03/01/2021    CO2 26 08/11/2021    CO2 31 03/01/2021    ANIONGAP 7 08/11/2021    ANIONGAP 3 03/01/2021     (H) 08/11/2021     (H) 03/01/2021    BUN 19 08/11/2021    BUN 17 03/01/2021    CR 1.08 08/11/2021    CR 1.10 03/01/2021    GFRESTIMATED 69 08/11/2021    GFRESTIMATED 68 03/01/2021    GFRESTBLACK 78 03/01/2021    ALIZA 9.5 08/11/2021    ALIZA 9.6 03/01/2021 6/29/22: 3 day zio:   SR with 60 runs SVT- longest 13 seconds-rte 101 bpm  PAC- 20% burden  Frequent AT    5/6/22: Echo  Interpretation Summary    Global and regional left ventricular function is normal with an EF of 60-65%.  Left ventricular diastolic function is normal.  Mild concentric wall thickening consistent with left ventricular hypertrophy  is present.  Global right ventricular function is normal.  No significant valvular abnormalities.  No pericardial effusion is present.  Dilation of the inferior vena cava is present with abnormal respiratory  variation in diameter suggests a high RA pressure estimated at 15 mmHg or  greater.    There is no prior study for direct comparison.      EKG 6/29/2022 personally reviewed shows sinus rhythm with occasional PVC.    Assessment and Plan:     #Frequent PACs (19.7% burden as assessed by Zio patch 6/29/2022)  #Nonsustained  SVT  #Echocardiogram with mild LVH and normal LVEF, no valve disease  -Patient is asymptomatic from PACs or nonsustained SVT.  Highly likely due to uncontrolled blood pressure.  Recommended adding spironolactone 25 mg to improve blood pressure control I also recommended sleep study to rule out sleep apnea since he reports poor sleep.    #Family history of premature CAD  -Recommend coronary calcium screening    #Hypertension  #Lower extremity edema likely due to amlodipine  -Recommend to cut down amlodipine from 10 to 5 mg which will likely improve lower extremity swelling.  -Continue hydrochlorothiazide 25 mg  -Start spironolactone 25 mg  -BMP 2 weeks  -Patient was on lisinopril in the past.  He developed cough and switched to current medical regimen.  He still has cough from time to time when he travels.  In the future he might be a candidate for re-trial of ACE inhibitor versus ARB.  -Recommend lifestyle changes like cut down on salt, lose weight and physical activity.  Walk half an hour 5 times a week.    Return to clinic 3 months.    Total time spent today for this visit is 90 minutes including precharting, face-to-face clinic visit, review of labs/imaging and medical documentation.    Please donot hesitate to contact me if you have any questions or concerns. Again, thank you for allowing me to participate in the care of your patient.    Joya GORDON MD  [facility] Division of Cardiology  Pager [phone]

## 2022-08-12 NOTE — PATIENT INSTRUCTIONS
Thank you for coming to the [facility] @ [facility]; please note the following instructions:    1. DECREASE: Amlodipine to 5mg daily    2. START: spironolactone (ALDACTONE) 25 MG tablet daily    3. Non-fasting labs in 2 weeks    4. CT calcium score. Call [phone] to schedule    5. Referral placed for sleep study. You should receive a call to schedule    6. Decrease salt intake. Try to lose weight    7. Monitor blood pressure at home and bring readings to next visit. Sit for at least 5 minutes prior to taking.     8. DrCarlos Can follow up in 3 months        If you have any questions regarding your visit please contact your care team:     Cardiology  Telephone Number   Helga TATUM., RN  Kathy THOMAS, RN   Diya HIDALGO, PALOMA SANZ, PALOMA SALGUERO, Visit Facilitator   [phone] (option 1)   For scheduling appts:     [phone] (select option 1)       For the Device Clinic (Pacemakers and ICD's)  RN's :  Bibiana Junior   During business hours: [phone]    *After business hours:  [phone] (select option 4)      Normal test result notifications will be released via [facility] or mailed within 7 business days.  All other test results, will be communicated via telephone once reviewed by your cardiologist.    If you need a medication refill please contact your pharmacy.  Please allow 3 business days for your refill to be completed.    As always, thank you for trusting us with your health care needs!

## 2022-08-12 NOTE — NURSING NOTE
"Chief Complaint   Patient presents with     Palpitations     Dr Woodson: new pt. Referred for palpitations- see tyeshao report , HX of HTN, HLD.        Initial BP (!) 157/82 (BP Location: Left arm, Patient Position: Chair, Cuff Size: Adult Large)   Pulse 66   Wt 93.4 kg (206 lb)   SpO2 98%   BMI 28.73 kg/m   Estimated body mass index is 28.73 kg/m  as calculated from the following:    Height as of 6/29/22: 1.803 m (5' 11\").    Weight as of this encounter: 93.4 kg (206 lb)..  BP completed using cuff size: PALOMA Georges  "

## 2022-08-17 ENCOUNTER — ANCILLARY PROCEDURE (OUTPATIENT)
Dept: CT IMAGING | Facility: CLINIC | Age: 72
End: 2022-08-17
Attending: INTERNAL MEDICINE

## 2022-08-17 ENCOUNTER — TELEPHONE (OUTPATIENT)
Dept: CARDIOLOGY | Facility: CLINIC | Age: 72
End: 2022-08-17

## 2022-08-17 DIAGNOSIS — I10 HYPERTENSION GOAL BP (BLOOD PRESSURE) < 140/90: ICD-10-CM

## 2022-08-17 PROCEDURE — 75571 CT HRT W/O DYE W/CA TEST: CPT | Mod: GC | Performed by: STUDENT IN AN ORGANIZED HEALTH CARE EDUCATION/TRAINING PROGRAM

## 2022-08-18 ENCOUNTER — TELEPHONE (OUTPATIENT)
Dept: CARDIOLOGY | Facility: CLINIC | Age: 72
End: 2022-08-18

## 2022-08-18 NOTE — TELEPHONE ENCOUNTER
This is a duplicate message. A my chart message was sent to pt this am, pt has not read this yet.   See duplicate telephone encounter from this am

## 2022-08-18 NOTE — TELEPHONE ENCOUNTER
Pt called a second time see duplicate message- pt did not read my chart message sent this am in response to initial call and results/ result note

## 2022-08-18 NOTE — TELEPHONE ENCOUNTER
[facility] Call Center    Phone Message    May a detailed message be left on voicemail: yes     Reason for Call: Other: Patient would like to discuss his most recent lab results with . Patient states this is an urgent matter. Please call patient back to further discuss, thank you.     Action Taken: Message routed to:  Other: Cardiology    Travel Screening: Not Applicable

## 2022-08-18 NOTE — TELEPHONE ENCOUNTER
[facility] Call Center    Phone Message    May a detailed message be left on voicemail: yes     Reason for Call: Other: Patient saw test results and wants to know the next steps in care plan     Action Taken: Other: routed to cardiology    Travel Screening: Not Applicable

## 2022-08-19 ENCOUNTER — TELEPHONE (OUTPATIENT)
Dept: CARDIOLOGY | Facility: CLINIC | Age: 72
End: 2022-08-19

## 2022-08-19 ENCOUNTER — LAB (OUTPATIENT)
Dept: LAB | Facility: CLINIC | Age: 72
End: 2022-08-19
Payer: COMMERCIAL

## 2022-08-19 DIAGNOSIS — I10 HYPERTENSION GOAL BP (BLOOD PRESSURE) < 140/90: ICD-10-CM

## 2022-08-19 DIAGNOSIS — I25.10 CAD (CORONARY ARTERY DISEASE): Primary | ICD-10-CM

## 2022-08-19 DIAGNOSIS — R93.1 ELEVATED CORONARY ARTERY CALCIUM SCORE: ICD-10-CM

## 2022-08-19 DIAGNOSIS — Z82.49 FAMILY HISTORY OF CORONARY ARTERY DISEASE: ICD-10-CM

## 2022-08-19 LAB
ANION GAP SERPL CALCULATED.3IONS-SCNC: 6 MMOL/L (ref 3–14)
BUN SERPL-MCNC: 18 MG/DL (ref 7–30)
CALCIUM SERPL-MCNC: 10 MG/DL (ref 8.5–10.1)
CHLORIDE BLD-SCNC: 105 MMOL/L (ref 94–109)
CO2 SERPL-SCNC: 28 MMOL/L (ref 20–32)
CREAT SERPL-MCNC: 1.3 MG/DL (ref 0.66–1.25)
GFR SERPL CREATININE-BSD FRML MDRD: 59 ML/MIN/1.73M2
GLUCOSE BLD-MCNC: 105 MG/DL (ref 70–99)
POTASSIUM BLD-SCNC: 4 MMOL/L (ref 3.4–5.3)
SODIUM SERPL-SCNC: 139 MMOL/L (ref 133–144)

## 2022-08-19 PROCEDURE — 36415 COLL VENOUS BLD VENIPUNCTURE: CPT

## 2022-08-19 PROCEDURE — 80048 BASIC METABOLIC PNL TOTAL CA: CPT

## 2022-08-19 RX ORDER — ROSUVASTATIN CALCIUM 10 MG/1
10 TABLET, COATED ORAL DAILY
Qty: 90 TABLET | Refills: 3 | Status: SHIPPED | OUTPATIENT
Start: 2022-08-19 | End: 2023-06-22

## 2022-08-19 NOTE — TELEPHONE ENCOUNTER
Date: 8/19/2022    Time of Call: 10:25 AM     Diagnosis: Elevated calcium score     [ TORB ] Ordering provider: Dr Woodson  Order:   Exercise stress echocardiogram  discontinue simvastatin  Start: Aspirin 81 mg daily  Start: crestor 10 mg daily    Follow up in clinic after testing     Order received by: Kathy Alves RN      Follow-up/additional notes: see my chart msg.   Offered appt next week to discuss results-pt declined  Will schedule stress test

## 2022-08-19 NOTE — TELEPHONE ENCOUNTER
Writer called schedulers back and scheduled for next available stress echo at [place] which is 9/8 at 2:30. Writer called patient primary phone number and LVM for patient with appointment date and asked that patient call schedulers back if that date/time does not work.    CHAO Figueroa

## 2022-08-19 NOTE — TELEPHONE ENCOUNTER
Per note below, scheduled patient for [place] stress echo on Monday, 8/22, at 2 pm. Called patient whose wife answered and said he preferred not to go to that location. I explained that this is the soonest opening available, however patient wife stated they did not feel safe going to that location. Writer stated she would call  back and try and find soonest opening at [facility] and call patient back.    CHAO Figueroa

## 2022-09-08 ENCOUNTER — ANCILLARY PROCEDURE (OUTPATIENT)
Dept: CARDIOLOGY | Facility: CLINIC | Age: 72
End: 2022-09-08
Attending: INTERNAL MEDICINE
Payer: COMMERCIAL

## 2022-09-08 DIAGNOSIS — I10 HYPERTENSION GOAL BP (BLOOD PRESSURE) < 140/90: ICD-10-CM

## 2022-09-08 DIAGNOSIS — Z82.49 FAMILY HISTORY OF CORONARY ARTERY DISEASE: ICD-10-CM

## 2022-09-08 DIAGNOSIS — R93.1 ELEVATED CORONARY ARTERY CALCIUM SCORE: ICD-10-CM

## 2022-09-08 DIAGNOSIS — I25.10 CAD (CORONARY ARTERY DISEASE): ICD-10-CM

## 2022-09-08 PROCEDURE — 93350 STRESS TTE ONLY: CPT | Performed by: INTERNAL MEDICINE

## 2022-09-08 PROCEDURE — 93325 DOPPLER ECHO COLOR FLOW MAPG: CPT | Performed by: INTERNAL MEDICINE

## 2022-09-08 PROCEDURE — 93018 CV STRESS TEST I&R ONLY: CPT | Performed by: INTERNAL MEDICINE

## 2022-09-08 PROCEDURE — 93321 DOPPLER ECHO F-UP/LMTD STD: CPT | Performed by: INTERNAL MEDICINE

## 2022-09-08 PROCEDURE — 93017 CV STRESS TEST TRACING ONLY: CPT | Performed by: INTERNAL MEDICINE

## 2022-09-08 PROCEDURE — 93016 CV STRESS TEST SUPVJ ONLY: CPT | Performed by: INTERNAL MEDICINE

## 2022-09-08 RX ADMIN — Medication 7 ML: at 14:27

## 2022-09-12 ENCOUNTER — MYC MEDICAL ADVICE (OUTPATIENT)
Dept: CARDIOLOGY | Facility: CLINIC | Age: 72
End: 2022-09-12

## 2022-09-25 DIAGNOSIS — N40.0 BENIGN NON-NODULAR PROSTATIC HYPERPLASIA WITHOUT LOWER URINARY TRACT SYMPTOMS: ICD-10-CM

## 2022-09-26 ENCOUNTER — MYC MEDICAL ADVICE (OUTPATIENT)
Dept: CARDIOLOGY | Facility: CLINIC | Age: 72
End: 2022-09-26

## 2022-09-26 RX ORDER — FINASTERIDE 5 MG/1
TABLET, FILM COATED ORAL
Qty: 90 TABLET | Refills: 3 | Status: SHIPPED | OUTPATIENT
Start: 2022-09-26 | End: 2023-06-28

## 2022-10-15 DIAGNOSIS — I10 HYPERTENSION GOAL BP (BLOOD PRESSURE) < 140/90: ICD-10-CM

## 2022-10-16 RX ORDER — HYDROCHLOROTHIAZIDE 25 MG/1
TABLET ORAL
Qty: 90 TABLET | Refills: 2 | Status: SHIPPED | OUTPATIENT
Start: 2022-10-16 | End: 2023-04-12

## 2022-11-03 ENCOUNTER — OFFICE VISIT (OUTPATIENT)
Dept: FAMILY MEDICINE | Facility: CLINIC | Age: 72
End: 2022-11-03
Payer: COMMERCIAL

## 2022-11-03 VITALS
DIASTOLIC BLOOD PRESSURE: 79 MMHG | BODY MASS INDEX: 27.45 KG/M2 | TEMPERATURE: 97.8 F | SYSTOLIC BLOOD PRESSURE: 160 MMHG | HEART RATE: 56 BPM | OXYGEN SATURATION: 98 % | WEIGHT: 196.8 LBS

## 2022-11-03 DIAGNOSIS — R05.8 PRODUCTIVE COUGH: ICD-10-CM

## 2022-11-03 DIAGNOSIS — J06.9 UPPER RESPIRATORY TRACT INFECTION, UNSPECIFIED TYPE: Primary | ICD-10-CM

## 2022-11-03 DIAGNOSIS — I10 HYPERTENSION GOAL BP (BLOOD PRESSURE) < 140/90: ICD-10-CM

## 2022-11-03 PROCEDURE — 99213 OFFICE O/P EST LOW 20 MIN: CPT | Performed by: PHYSICIAN ASSISTANT

## 2022-11-03 RX ORDER — AZITHROMYCIN 250 MG/1
TABLET, FILM COATED ORAL
Qty: 6 TABLET | Refills: 0 | Status: SHIPPED | OUTPATIENT
Start: 2022-11-03 | End: 2022-11-11

## 2022-11-03 ASSESSMENT — ENCOUNTER SYMPTOMS
RHINORRHEA: 1
FEVER: 0
DIARRHEA: 0
VOMITING: 0
NERVOUS/ANXIOUS: 0
CHILLS: 0
SORE THROAT: 1
SHORTNESS OF BREATH: 0
COUGH: 1
HEADACHES: 0

## 2022-11-03 ASSESSMENT — PAIN SCALES - GENERAL: PAINLEVEL: NO PAIN (0)

## 2022-11-03 NOTE — PATIENT INSTRUCTIONS
With your lung history and productive cough, you have been prescribed antibiotic, azithromycin.  As discussed, should you have any worsening symptoms, please reach out and we can complete flu and repeat COVID testing as well as an x-ray.  If you develop any severe symptoms such as difficulty breathing, chest pain, wheezing, high fevers, or any other severe symptoms, please follow-up at urgent care/emergency department right away.  Reach out with any questions or concerns.    You were provided with a prescription for a new blood pressure cuff.  You can pick this up at your pharmacy.

## 2022-11-03 NOTE — PROGRESS NOTES
Assessment & Plan     Productive cough  Upper respiratory tract infection, unspecified type  Patient is a 71-year-old male who presents to clinic due to productive cough.  Patient has history of collapsed lung and has developed similar symptoms in past requiring antibiotic treatment.  Home COVID-19 testing completed and negative.  No signs of respiratory distress test patient is able to speak in full sentences.  He denies any chest pain, fevers, or dyspnea.  Shared decision making completed.  You have an upcoming trip out of the country this weekend, will start treatment with azithromycin.  If symptoms worsen, patient will contact clinic tomorrow for chest x-ray and COVID-19/influenza testing.  Discussed symptoms are not urgent/emergent follow-up and return precautions.  - azithromycin (ZITHROMAX) 250 MG tablet; Take 2 tablets (500 mg) by mouth daily for 1 day, THEN 1 tablet (250 mg) daily for 4 days.    Hypertension goal BP (blood pressure) < 140/90  Patient has history of hypertension.  He has been checking blood pressure at home and cough but notes readings appear to be unreliable.  He would like to continue monitoring blood pressure at home.  Order placed for new blood pressure cuff.  - Home Blood Pressure Monitor Order for DME - ONLY FOR DME       See Patient Instructions    Return in about 1 week (around 11/10/2022), or if symptoms worsen or fail to improve.    Magda Rios PA-C  [facility] BARRY Meza is a 71 year old, presenting for the following health issues:  URI      History of Present Illness       Reason for visit:  Cough  Symptom onset:  1-3 days ago    He eats 0-1 servings of fruits and vegetables daily.He consumes 0 sweetened beverage(s) daily.He exercises with enough effort to increase his heart rate 30 to 60 minutes per day.  He exercises with enough effort to increase his heart rate 3 or less days per week.   He is taking medications regularly.     Patient  notes history of collapsed lung which makes him higher risk for pneumonia. Patient has had similar symptoms in the past requiring antibiotic treatment. Patient is traveling to [place] next week. He developed cough a few days ago with productive sputum.COVID19 home testing negative.     The patient's history of hypertension and does track blood pressure at home.  He notes blood pressure to be higher when in clinic.  He would like to continue monitoring blood pressure at home, but cuff has been giving unreliable readings.    Review of Systems   Constitutional: Negative for chills and fever.   HENT: Positive for congestion, rhinorrhea and sore throat.    Respiratory: Positive for cough. Negative for shortness of breath.    Cardiovascular: Negative for chest pain.   Gastrointestinal: Negative for diarrhea and vomiting.   Skin: Negative for rash.   Neurological: Negative for headaches.   Psychiatric/Behavioral: The patient is not nervous/anxious.             Objective    BP (!) 160/79 (BP Location: Left arm, Cuff Size: Adult Large)   Pulse 56   Temp 97.8  F (36.6  C) (Tympanic)   Wt 89.3 kg (196 lb 12.8 oz)   SpO2 98%   BMI 27.45 kg/m    Body mass index is 27.45 kg/m .  Physical Exam  Vitals and nursing note reviewed.   Constitutional:       General: He is not in acute distress.     Appearance: Normal appearance.   HENT:      Head: Normocephalic and atraumatic.      Mouth/Throat:      Mouth: Mucous membranes are moist.      Pharynx: Oropharynx is clear.   Eyes:      Extraocular Movements: Extraocular movements intact.      Pupils: Pupils are equal, round, and reactive to light.   Cardiovascular:      Rate and Rhythm: Normal rate and regular rhythm.      Heart sounds: Normal heart sounds.   Pulmonary:      Effort: Pulmonary effort is normal. No respiratory distress.      Breath sounds: Normal breath sounds. No wheezing.   Musculoskeletal:         General: Normal range of motion.      Cervical back: Normal range of  motion.   Skin:     General: Skin is warm and dry.   Neurological:      General: No focal deficit present.      Mental Status: He is alert.   Psychiatric:         Mood and Affect: Mood normal.         Behavior: Behavior normal.

## 2022-11-11 ENCOUNTER — MYC REFILL (OUTPATIENT)
Dept: FAMILY MEDICINE | Facility: CLINIC | Age: 72
End: 2022-11-11

## 2022-11-11 DIAGNOSIS — R05.8 PRODUCTIVE COUGH: ICD-10-CM

## 2022-11-11 RX ORDER — AZITHROMYCIN 250 MG/1
TABLET, FILM COATED ORAL
Qty: 6 TABLET | Refills: 0 | Status: SHIPPED | OUTPATIENT
Start: 2022-11-11 | End: 2022-11-16

## 2022-12-09 ENCOUNTER — OFFICE VISIT (OUTPATIENT)
Dept: CARDIOLOGY | Facility: CLINIC | Age: 72
End: 2022-12-09
Payer: COMMERCIAL

## 2022-12-09 VITALS
OXYGEN SATURATION: 100 % | DIASTOLIC BLOOD PRESSURE: 90 MMHG | HEART RATE: 63 BPM | BODY MASS INDEX: 27.5 KG/M2 | WEIGHT: 197.2 LBS | SYSTOLIC BLOOD PRESSURE: 150 MMHG

## 2022-12-09 DIAGNOSIS — I25.84 CORONARY ATHEROSCLEROSIS DUE TO SEVERELY CALCIFIED CORONARY LESION: ICD-10-CM

## 2022-12-09 DIAGNOSIS — I10 PRIMARY HYPERTENSION: ICD-10-CM

## 2022-12-09 DIAGNOSIS — I49.1 PREMATURE ATRIAL CONTRACTIONS: ICD-10-CM

## 2022-12-09 DIAGNOSIS — R93.1 ELEVATED CORONARY ARTERY CALCIUM SCORE: Primary | ICD-10-CM

## 2022-12-09 DIAGNOSIS — I10 HYPERTENSION, UNCONTROLLED: ICD-10-CM

## 2022-12-09 PROCEDURE — 99417 PROLNG OP E/M EACH 15 MIN: CPT | Performed by: INTERNAL MEDICINE

## 2022-12-09 PROCEDURE — 99215 OFFICE O/P EST HI 40 MIN: CPT | Performed by: INTERNAL MEDICINE

## 2022-12-09 RX ORDER — AMLODIPINE BESYLATE 2.5 MG/1
2.5 TABLET ORAL DAILY
Qty: 90 TABLET | Refills: 3 | Status: SHIPPED | OUTPATIENT
Start: 2022-12-09 | End: 2023-06-28

## 2022-12-09 NOTE — PATIENT INSTRUCTIONS
Thank you for coming to the [facility] at [place]; please note the following instructions:    1. Increase amlodipine to total 7.5 mg per day. Dr Woodson ordered an additional 2.5 mg to take along with your 5 mg tablets.     2. Please let us know who your new primary care provider is when you find one.    3. Dr Woodson requested you get a different blood pressure cuff as the wrist one doesn't seem to be working properly. If the readings are 130/80 or higher, please send a [employer] message to Dr Woodson's care team.    4. Dr. Joya Woodson would like you to return for a cardiac follow up in 1 year  (December).  We will contact you regarding your appointment when the time draws closer or you may call [phone] option #1 to arrange an appointment.  Mean while, if you should have any questions or concerns regarding your heart health, please contact us.  Thank you for choosing [facility] for your care.          If you have any questions regarding your visit, please contact your care team:     CARDIOLOGY  TELEPHONE NUMBER   Helga TATUMCarlos, Registered Nurse  Kathy THOMAS, Registered Nurse  Sommer SANZ, Registered Medical Assistant  Vida ALCARAZ, Certified Medical Assistant  Caitie SALGUERO, Visit Facilitator [phone] (select option 1)    *After hours: [phone]   For Scheduling Appts:     [phone] (select option 1)    *After hours: [phone]   For the Device Clinic (Pacemakers and ICD's)  Bibiana FORDE, Registered Nurse   During business hours: [phone]    *After business hours:  [phone] (select option 4)      Normal test result notifications will be released via [employer] or mailed within 7 business days.  All other test results, will be communicated via telephone once reviewed by your cardiologist.    If you need a medication refill, please contact your pharmacy.  Please allow 3 business days for your refill to be completed.    As always, thank you for trusting us with your health care needs!

## 2022-12-09 NOTE — LETTER
12/9/2022      RE: Erick Yusuf  [address]  [place] [address]       Dear Colleague,    Thank you for the opportunity to participate in the care of your patient, Erick Yusuf, at the [facility]. Please see a copy of my visit note below.                                                                                  General Cardiology [facility]      Referring provider: Sancho Rushing MD    HPI: Mr. Erick Yusuf is a 71 year old  male with PMH significant for    -Hypertension  -Family history of premature CAD    Patient was recently seen in primary care clinic by Dr. Rushing on 6/29/2022.  He was found to have irregular heartbeat on EKG.  Subsequently underwent Zio patch for 2 days which showed frequent PACs (19%), 60 episodes of nonsustained SVT longest lasting 12 seconds.  Patient tells me that he did not feel anything unusual when he was wearing the heart monitor.  Denies chest pain, shortness of breath, palpitations, dizziness or syncope.  Reports lower extremity edema more so over the last 1 year (since he started amlodipine).  Reports frequent cough after travels.    Does not smoke.  Alcohol occasional.  No diabetes.  Reports high blood pressure at home.  Blood pressure is also high in clinic today.  He does not monitor BP very frequently.  He has history of hypertension for over 20 years.  Family history is significant for premature CAD.  His brother had CABG in his 40s.     Echocardiogram on 5/6/2022 shows normal biventricular function, mild LVH, no significant valve disease.    Patient is currently on amlodipine 10 mg, hydrochlorothiazide 25 mg and simvastatin 20 mg.    Medications, personal, family, and social history reviewed with patient and revised.    Interval history 12/9/2022  Patient is being seen today to discuss recent test results including CT coronary calcium which  showed severe elevated coronary calcium score and to recheck blood pressure control.  Underwent exercise stress echocardiogram 9/8/2022 which was submaximal with no stress-induced ischemia.  Patient was able to achieve only 82% of maximal heart rate.    Patient brought a blood pressure log from home which shows blood pressure in the range of 125-135/70-85 mmHg.  He reports no cardiac symptoms.  He is physically active.    PAST MEDICAL HISTORY:  Past Medical History:   Diagnosis Date     HTN (hypertension)      Hypercholesteremia      Leucopenia        CURRENT MEDICATIONS:  Current Outpatient Medications   Medication Sig Dispense Refill     amLODIPine (NORVASC) 5 MG tablet Take 1 tablet (5 mg) by mouth daily 90 tablet 3     aspirin (ASA) 81 MG EC tablet Take 1 tablet (81 mg) by mouth daily 90 tablet 3     benztropine (COGENTIN) 0.5 MG tablet Take 0.5 mg by mouth daily Dose unknown       finasteride (PROSCAR) 5 MG tablet TAKE 1 TABLET DAILY 90 tablet 3     hydrochlorothiazide (HYDRODIURIL) 25 MG tablet TAKE 1 TABLET DAILY 90 tablet 2     multivitamin w/minerals (THERA-VIT-M) tablet Take 1 tablet by mouth daily.       rosuvastatin (CRESTOR) 10 MG tablet Take 1 tablet (10 mg) by mouth daily 90 tablet 3     spironolactone (ALDACTONE) 25 MG tablet Take 1 tablet (25 mg) by mouth daily 90 tablet 3       PAST SURGICAL HISTORY:  Past Surgical History:   Procedure Laterality Date     NO HISTORY OF SURGERY         ALLERGIES:     Allergies   Allergen Reactions     Prednisone Swelling     Al over severe body swelling       FAMILY HISTORY:  Family History   Problem Relation Age of Onset     Diabetes Father      Heart Disease Brother          SOCIAL HISTORY:  Social History     Tobacco Use     Smoking status: Never     Smokeless tobacco: Never   Vaping Use     Vaping Use: Never used   Substance Use Topics     Alcohol use: Not Currently     Comment: occasionally     Drug use: No       ROS:   Constitutional: No fever, chills, or  sweats. Weight stable.   Cardiovascular: As per HPI.       Exam:  BP (!) 150/90   Pulse 63   Wt 89.4 kg (197 lb 3.2 oz)   SpO2 100%   BMI 27.50 kg/m    GENERAL APPEARANCE: alert and no distress  HEENT: no icterus, no central cyanosis  CARDIOVASCULAR: regular rhythm, normal S1, S2, no S3 or S4 and no murmur, click or rub, precordium quiet with normal PMI.  EXTREMITIES: Trace pretibial edema on both sides.  NEURO: alert, normal speech,and affect  SKIN: no ecchymoses, no rashes     I have reviewed the labs and personally reviewed the imaging below and made my comment in the assessment and plan.    Labs:  CBC RESULTS:   Lab Results   Component Value Date    WBC 4.7 09/17/2015    RBC 4.75 09/17/2015    HGB 14.7 09/17/2015    HCT 42.8 09/17/2015    MCV 90 09/17/2015    MCH 30.9 09/17/2015    MCHC 34.3 09/17/2015    RDW 13.2 09/17/2015     (L) 09/17/2015       BMP RESULTS:  Lab Results   Component Value Date     08/19/2022     03/01/2021    POTASSIUM 4.0 08/19/2022    POTASSIUM 3.7 03/01/2021    CHLORIDE 105 08/19/2022    CHLORIDE 105 03/01/2021    CO2 28 08/19/2022    CO2 31 03/01/2021    ANIONGAP 6 08/19/2022    ANIONGAP 3 03/01/2021     (H) 08/19/2022     (H) 03/01/2021    BUN 18 08/19/2022    BUN 17 03/01/2021    CR 1.30 (H) 08/19/2022    CR 1.10 03/01/2021    GFRESTIMATED 59 (L) 08/19/2022    GFRESTIMATED 68 03/01/2021    GFRESTBLACK 78 03/01/2021    ALIZA 10.0 08/19/2022    ALIZA 9.6 03/01/2021 6/29/22: 3 day zio:   SR with 60 runs SVT- longest 13 seconds-rte 101 bpm  PAC- 20% burden  Frequent AT      Exercise stress echocardiogram 9/8/2022  Near maximal stress test, achieved 82% of the age predicted maximal heart  rate. Test stopped due to hypertension.     No angina symptoms with exercise.  No ECG evidence of ischemia.  Normal segmental and global LV function with EF of approximately 55-60% at  rest; with exercise left ventricular cavity size decreases and LVEF increases  to  60-65%.  No stress induced regional wall motion abnormalities.  Average functional capacity for age.     No significant valvular dysfunction noted on screening 2D and Doppler  Examination.    CT calcium screening 8/17/2022  Severe coronary artery calcification total score 1425 placing the patient in the 89th percentile.    5/6/22: Echo  Interpretation Summary    Global and regional left ventricular function is normal with an EF of 60-65%.  Left ventricular diastolic function is normal.  Mild concentric wall thickening consistent with left ventricular hypertrophy  is present.  Global right ventricular function is normal.  No significant valvular abnormalities.  No pericardial effusion is present.  Dilation of the inferior vena cava is present with abnormal respiratory  variation in diameter suggests a high RA pressure estimated at 15 mmHg or  greater.    There is no prior study for direct comparison.      EKG 6/29/2022 personally reviewed shows sinus rhythm with occasional PVC.    Assessment and Plan:     #Frequent PACs and nonsustained SVT (19.7% burden as assessed by Zio patch 6/29/2022)  -Frequent PACs still present on physical exam today.  Echocardiogram with mild LVH and normal LVEF, no valve disease  -Patient asymptomatic.  -Recommend blood pressure control (not well controlled yet).    #Hypertension  #Family history of premature CAD  #Severe coronary calcifications on CT calcium screening 8/17/2022 (total calcium score 1425)  -Patient is asymptomatic.  Blood pressure still not well controlled in clinic.  At home it is mildly elevated.  -He had lower extremity swelling when he was on amlodipine 10 mg.  Therefore I reduced the dose to 5 mg and added spironolactone 25 mg when I saw him last time 3 months ago.  His blood pressure improved but still on the high side.  Lower extremity swelling resolved.  -Recommend to increase the amlodipine from 5 to 7.5 mg daily  -We will continue hydrochlorothiazide 25,  spironolactone 25, Crestor 10 mg and aspirin 81 mg.  He developed cough when he was on lisinopril and he was telling me that he was coughing when he was traveling.  I am not sure if the cough was directly related to lisinopril.  I think either ACE or ARB can be an option in the future just to retry if blood pressure is not well controlled with current treatment.  -Patient will inform us if blood pressure is over 130/80 mmHg.  -Emphasized lifestyle changes including cutting down on salt, losing weight and exercise at least 30 minutes 5 times a week.    Return to clinic 1 year.    Total time spent today for this visit is 83 minutes including precharting, face-to-face clinic visit, review of labs/imaging and medical documentation.    Please donot hesitate to contact me if you have any questions or concerns. Again, thank you for allowing me to participate in the care of your patient.    Joya GORDON MD  [facility] Division of Cardiology  Pager [phone]

## 2022-12-09 NOTE — PROGRESS NOTES
[facility]      Referring provider: Sancho Rushing MD    HPI: Mr. Erick Yusuf is a 71 year old  male with PMH significant for    -Hypertension  -Family history of premature CAD    Patient was recently seen in primary care clinic by Dr. Rushing on 6/29/2022.  He was found to have irregular heartbeat on EKG.  Subsequently underwent Zio patch for 2 days which showed frequent PACs (19%), 60 episodes of nonsustained SVT longest lasting 12 seconds.  Patient tells me that he did not feel anything unusual when he was wearing the heart monitor.  Denies chest pain, shortness of breath, palpitations, dizziness or syncope.  Reports lower extremity edema more so over the last 1 year (since he started amlodipine).  Reports frequent cough after travels.    Does not smoke.  Alcohol occasional.  No diabetes.  Reports high blood pressure at home.  Blood pressure is also high in clinic today.  He does not monitor BP very frequently.  He has history of hypertension for over 20 years.  Family history is significant for premature CAD.  His brother had CABG in his 40s.     Echocardiogram on 5/6/2022 shows normal biventricular function, mild LVH, no significant valve disease.    Patient is currently on amlodipine 10 mg, hydrochlorothiazide 25 mg and simvastatin 20 mg.    Medications, personal, family, and social history reviewed with patient and revised.    Interval history 12/9/2022  Patient is being seen today to discuss recent test results including CT coronary calcium which showed severe elevated coronary calcium score and to recheck blood pressure control.  Underwent exercise stress echocardiogram 9/8/2022 which was submaximal with no stress-induced ischemia.  Patient was able to achieve only 82% of maximal heart rate.    Patient brought a blood pressure log from home which shows blood pressure in the range of  125-135/70-85 mmHg.  He reports no cardiac symptoms.  He is physically active.    PAST MEDICAL HISTORY:  Past Medical History:   Diagnosis Date     HTN (hypertension)      Hypercholesteremia      Leucopenia        CURRENT MEDICATIONS:  Current Outpatient Medications   Medication Sig Dispense Refill     amLODIPine (NORVASC) 5 MG tablet Take 1 tablet (5 mg) by mouth daily 90 tablet 3     aspirin (ASA) 81 MG EC tablet Take 1 tablet (81 mg) by mouth daily 90 tablet 3     benztropine (COGENTIN) 0.5 MG tablet Take 0.5 mg by mouth daily Dose unknown       finasteride (PROSCAR) 5 MG tablet TAKE 1 TABLET DAILY 90 tablet 3     hydrochlorothiazide (HYDRODIURIL) 25 MG tablet TAKE 1 TABLET DAILY 90 tablet 2     multivitamin w/minerals (THERA-VIT-M) tablet Take 1 tablet by mouth daily.       rosuvastatin (CRESTOR) 10 MG tablet Take 1 tablet (10 mg) by mouth daily 90 tablet 3     spironolactone (ALDACTONE) 25 MG tablet Take 1 tablet (25 mg) by mouth daily 90 tablet 3       PAST SURGICAL HISTORY:  Past Surgical History:   Procedure Laterality Date     NO HISTORY OF SURGERY         ALLERGIES:     Allergies   Allergen Reactions     Prednisone Swelling     Al over severe body swelling       FAMILY HISTORY:  Family History   Problem Relation Age of Onset     Diabetes Father      Heart Disease Brother          SOCIAL HISTORY:  Social History     Tobacco Use     Smoking status: Never     Smokeless tobacco: Never   Vaping Use     Vaping Use: Never used   Substance Use Topics     Alcohol use: Not Currently     Comment: occasionally     Drug use: No       ROS:   Constitutional: No fever, chills, or sweats. Weight stable.   Cardiovascular: As per HPI.       Exam:  BP (!) 150/90   Pulse 63   Wt 89.4 kg (197 lb 3.2 oz)   SpO2 100%   BMI 27.50 kg/m    GENERAL APPEARANCE: alert and no distress  HEENT: no icterus, no central cyanosis  CARDIOVASCULAR: regular rhythm, normal S1, S2, no S3 or S4 and no murmur, click or rub, precordium quiet with  normal PMI.  EXTREMITIES: Trace pretibial edema on both sides.  NEURO: alert, normal speech,and affect  SKIN: no ecchymoses, no rashes     I have reviewed the labs and personally reviewed the imaging below and made my comment in the assessment and plan.    Labs:  CBC RESULTS:   Lab Results   Component Value Date    WBC 4.7 09/17/2015    RBC 4.75 09/17/2015    HGB 14.7 09/17/2015    HCT 42.8 09/17/2015    MCV 90 09/17/2015    MCH 30.9 09/17/2015    MCHC 34.3 09/17/2015    RDW 13.2 09/17/2015     (L) 09/17/2015       BMP RESULTS:  Lab Results   Component Value Date     08/19/2022     03/01/2021    POTASSIUM 4.0 08/19/2022    POTASSIUM 3.7 03/01/2021    CHLORIDE 105 08/19/2022    CHLORIDE 105 03/01/2021    CO2 28 08/19/2022    CO2 31 03/01/2021    ANIONGAP 6 08/19/2022    ANIONGAP 3 03/01/2021     (H) 08/19/2022     (H) 03/01/2021    BUN 18 08/19/2022    BUN 17 03/01/2021    CR 1.30 (H) 08/19/2022    CR 1.10 03/01/2021    GFRESTIMATED 59 (L) 08/19/2022    GFRESTIMATED 68 03/01/2021    GFRESTBLACK 78 03/01/2021    ALIZA 10.0 08/19/2022    ALIZA 9.6 03/01/2021 6/29/22: 3 day zio:   SR with 60 runs SVT- longest 13 seconds-rte 101 bpm  PAC- 20% burden  Frequent AT      Exercise stress echocardiogram 9/8/2022  Near maximal stress test, achieved 82% of the age predicted maximal heart  rate. Test stopped due to hypertension.     No angina symptoms with exercise.  No ECG evidence of ischemia.  Normal segmental and global LV function with EF of approximately 55-60% at  rest; with exercise left ventricular cavity size decreases and LVEF increases  to 60-65%.  No stress induced regional wall motion abnormalities.  Average functional capacity for age.     No significant valvular dysfunction noted on screening 2D and Doppler  Examination.    CT calcium screening 8/17/2022  Severe coronary artery calcification total score 1425 placing the patient in the 89th percentile.    5/6/22:  Echo  Interpretation Summary    Global and regional left ventricular function is normal with an EF of 60-65%.  Left ventricular diastolic function is normal.  Mild concentric wall thickening consistent with left ventricular hypertrophy  is present.  Global right ventricular function is normal.  No significant valvular abnormalities.  No pericardial effusion is present.  Dilation of the inferior vena cava is present with abnormal respiratory  variation in diameter suggests a high RA pressure estimated at 15 mmHg or  greater.    There is no prior study for direct comparison.      EKG 6/29/2022 personally reviewed shows sinus rhythm with occasional PVC.    Assessment and Plan:     #Frequent PACs and nonsustained SVT (19.7% burden as assessed by Zio patch 6/29/2022)  -Frequent PACs still present on physical exam today.  Echocardiogram with mild LVH and normal LVEF, no valve disease  -Patient asymptomatic.  -Recommend blood pressure control (not well controlled yet).    #Hypertension  #Family history of premature CAD  #Severe coronary calcifications on CT calcium screening 8/17/2022 (total calcium score 1425)  -Patient is asymptomatic.  Blood pressure still not well controlled in clinic.  At home it is mildly elevated.  -He had lower extremity swelling when he was on amlodipine 10 mg.  Therefore I reduced the dose to 5 mg and added spironolactone 25 mg when I saw him last time 3 months ago.  His blood pressure improved but still on the high side.  Lower extremity swelling resolved.  -Recommend to increase the amlodipine from 5 to 7.5 mg daily  -We will continue hydrochlorothiazide 25, spironolactone 25, Crestor 10 mg and aspirin 81 mg.  He developed cough when he was on lisinopril and he was telling me that he was coughing when he was traveling.  I am not sure if the cough was directly related to lisinopril.  I think either ACE or ARB can be an option in the future just to retry if blood pressure is not well controlled  with current treatment.  -Patient will inform us if blood pressure is over 130/80 mmHg.  -Emphasized lifestyle changes including cutting down on salt, losing weight and exercise at least 30 minutes 5 times a week.    Return to clinic 1 year.    Total time spent today for this visit is 83 minutes including precharting, face-to-face clinic visit, review of labs/imaging and medical documentation.    Please donot hesitate to contact me if you have any questions or concerns. Again, thank you for allowing me to participate in the care of your patient.    Joya GORDON MD  [facility] Division of Cardiology  Pager [phone]

## 2022-12-09 NOTE — NURSING NOTE
"Chief Complaint   Patient presents with     Follow Up     Pt reports no abnormal heart symptoms, Dr. Woodson return dx Premature atrial contraction-       Initial BP (!) 162/83 (BP Location: Right arm, Patient Position: Sitting, Cuff Size: Adult Large)   Pulse 59   Wt 89.4 kg (197 lb 3.2 oz)   SpO2 97%   BMI 27.50 kg/m   Estimated body mass index is 27.5 kg/m  as calculated from the following:    Height as of 6/29/22: 1.803 m (5' 11\").    Weight as of this encounter: 89.4 kg (197 lb 3.2 oz)..  BP completed using cuff size: CHAO Chandler    "

## 2023-01-08 ENCOUNTER — HEALTH MAINTENANCE LETTER (OUTPATIENT)
Age: 73
End: 2023-01-08

## 2023-02-08 ENCOUNTER — TELEPHONE (OUTPATIENT)
Dept: CARDIOLOGY | Facility: CLINIC | Age: 73
End: 2023-02-08
Payer: COMMERCIAL

## 2023-02-08 NOTE — TELEPHONE ENCOUNTER
Spoke to pharmacistIrineo and verified that yes, pt is to continue both medications, spironolactone and hydrochlorothiazide)  Helga Hector, RN      12/9/2022  [facility] Joya Almaguer MD       #Hypertension  #Family history of premature CAD  #Severe coronary calcifications on CT calcium screening 8/17/2022 (total calcium score 1425)  -Patient is asymptomatic.  Blood pressure still not well controlled in clinic.  At home it is mildly elevated.  -He had lower extremity swelling when he was on amlodipine 10 mg.  Therefore I reduced the dose to 5 mg and added spironolactone 25 mg when I saw him last time 3 months ago.  His blood pressure improved but still on the high side.  Lower extremity swelling resolved.  -Recommend to increase the amlodipine from 5 to 7.5 mg daily  -We will continue hydrochlorothiazide 25, spironolactone 25, Crestor 10 mg and aspirin 81 mg.  He developed cough when he was on lisinopril and he was telling me that he was coughing when he was traveling.  I am not sure if the cough was directly related to lisinopril.  I think either ACE or ARB can be an option in the future just to retry if blood pressure is not well controlled with current treatment.  -Patient will inform us if blood pressure is over 130/80 mmHg.  -Emphasized lifestyle changes including cutting down on salt, losing weight and exercise at least 30 minutes 5 times a week.     Return to clinic 1 year.

## 2023-02-08 NOTE — TELEPHONE ENCOUNTER
[facility] Call Center    Phone Message    May a detailed message be left on voicemail: no     Reason for Call: Medication Question or concern regarding medication   Prescription Clarification  Name of Medication: spironolactone (ALDACTONE) 25 MG tablet  AND  hydrochlorothiazide (HYDRODIURIL)  Prescribing Provider: Dr. Woodson and Dr. Rushing   Pharmacy:  [facility]     What on the order needs clarification? Irineo the pharmacist called wanting some clarification on these medications with both of them being blood thinners. Irineo was wondering is pt should be taking both of them and would like clarification since each Rx was written by a different provider is and concerned about duplicate Rx. Please call Irineo at the pharmacy to discuss. Thank you!          Action Taken: Other: Cardiology    Travel Screening: Not Applicable       Thank you!  [facility]

## 2023-02-14 NOTE — PATIENT INSTRUCTIONS
Patient Education   Personalized Prevention Plan  You are due for the preventive services outlined below.  Your care team is available to assist you in scheduling these services.  If you have already completed any of these items, please share that information with your care team to update in your medical record.  Health Maintenance Due   Topic Date Due     Zoster (Shingles) Vaccine (1 of 2) Never done     Annual Wellness Visit  Never done     AORTIC ANEURYSM SCREENING (SYSTEM ASSIGNED)  Never done     COVID-19 Vaccine (4 - Booster for Moderna series) 01/01/2022     Cholesterol Lab  03/01/2022     Comprehensive Metabolic Panel  08/11/2022     Discuss Advance Care Planning  11/07/2022     ANNUAL REVIEW OF HM ORDERS  11/12/2022     PHQ-2 (once per calendar year)  01/01/2023

## 2023-02-14 NOTE — PROGRESS NOTES
"  Cold symptoms 1.5 weeks, cough, throwing up, sore throat, fatigue     Annual Wellness Visit    Patient has been advised of split billing requirements and indicates understanding: Yes     Are you in the first 12 months of your Medicare Part B coverage?  No  Healthy Habits:     In general, how would you rate your overall health?  Good    Frequency of exercise:  1 day/week    Duration of exercise:  15-30 minutes    Do you usually eat at least 4 servings of fruit and vegetables a day, include whole grains    & fiber and avoid regularly eating high fat or \"junk\" foods?  No    Taking medications regularly:  Yes    Medication side effects:  Muscle aches    Ability to successfully perform activities of daily living:  No assistance needed    Home Safety:  No safety concerns identified    Hearing Impairment:  No hearing concerns    In the past 6 months, have you been bothered by leaking of urine?  No    In general, how would you rate your overall mental or emotional health?  Excellent      PHQ-2 Total Score: 0    Additional concerns today:  No  Do you feel safe in your environment? Yes    Have you ever done Advance Care Planning? (For example, a Health Directive, POLST, or a discussion with a medical provider or your loved ones about your wishes)? Yes, patient states has an Advance Care Planning document and will bring a copy to the clinic.    Fall risk:  Fallen 2 or more times in the past year?: No  Any fall with injury in the past year?: No    Cognitive Screening: normal cognition    Do you have sleep apnea, excessive snoring or daytime drowsiness?: no    Current providers sharing in care for this patient include:   Patient Care Team:  Sancho Rushing MD as PCP - General  Sancho Rushing MD as Assigned PCP  Layo Cevallos DO as Assigned Surgical Provider  Joya Woodson MD as Assigned Heart and Vascular Provider        Review of Systems   Constitutional: Negative for chills and fever.   HENT: Positive for " "sore throat. Negative for congestion, ear pain and hearing loss.    Eyes: Negative for pain and visual disturbance.   Respiratory: Positive for cough. Negative for shortness of breath.    Cardiovascular: Negative for chest pain, palpitations and peripheral edema.   Gastrointestinal: Negative for abdominal pain, constipation, diarrhea, heartburn, hematochezia and nausea.   Genitourinary: Positive for frequency. Negative for dysuria, genital sores, hematuria, impotence, penile discharge and urgency.   Musculoskeletal: Negative for arthralgias, joint swelling and myalgias.   Skin: Negative for rash.   Neurological: Negative for dizziness, weakness, headaches and paresthesias.   Psychiatric/Behavioral: Negative for mood changes. The patient is not nervous/anxious.    Answers for HPI/ROS submitted by the patient on 2/15/2023  In general, how would you rate your overall physical health?: good  Frequency of exercise:: 1 day/week  Do you usually eat at least 4 servings of fruit and vegetables a day, include whole grains & fiber, and avoid regularly eating high fat or \"junk\" foods? : No  Taking medications regularly:: Yes  Medication side effects:: Muscle aches  Activities of Daily Living: no assistance needed  Home safety: no safety concerns identified  Hearing Impairment:: no hearing concerns  In the past 6 months, have you been bothered by leaking of urine?: No  In general, how would you rate your overall mental or emotional health?: excellent  Additional concerns today:: No  Duration of exercise:: 15-30 minutes  SUBJECTIVE:  72 year old.The patient has a complaint of cold.  This started 10 days ago.  Associated symptoms are sinus drainage and cough productive.  Brought on by covid negative. ROS no fever or chills      Reviewed health maintenance  Patient Active Problem List   Diagnosis     HYPERLIPIDEMIA LDL GOAL <130     Hypertension goal BP (blood pressure) < 140/90     Advanced directives, counseling/discussion     " "Benign non-nodular prostatic hyperplasia without lower urinary tract symptoms     Impacted cerumen of left ear     Past Medical History:   Diagnosis Date     HTN (hypertension)      Hypercholesteremia      Leucopenia        OBJECTIVE:  no apparent distress  /68   Pulse 78   Temp 97.8  F (36.6  C) (Tympanic)   Resp 20   Ht 1.803 m (5' 11\")   Wt 89.6 kg (197 lb 9.6 oz)   SpO2 97%   BMI 27.56 kg/m    Pharynx slight erythema   LUNGS:  CTA B/L, no wheezing or crackles.   Cardiovascular: negative, PMI normal. No lifts, heaves, or thrills. RRR. No murmurs, clicks gallops or rub          ICD-10-CM    1. Encounter for Medicare annual wellness exam  Z00.00       2. Productive cough  R05.8 azithromycin (ZITHROMAX) 250 MG tablet      3. Cough  R05.9        PLAN: reassurance      "

## 2023-02-15 ENCOUNTER — OFFICE VISIT (OUTPATIENT)
Dept: FAMILY MEDICINE | Facility: CLINIC | Age: 73
End: 2023-02-15
Payer: COMMERCIAL

## 2023-02-15 VITALS
TEMPERATURE: 97.8 F | HEIGHT: 71 IN | DIASTOLIC BLOOD PRESSURE: 68 MMHG | SYSTOLIC BLOOD PRESSURE: 135 MMHG | HEART RATE: 78 BPM | RESPIRATION RATE: 20 BRPM | OXYGEN SATURATION: 97 % | WEIGHT: 197.6 LBS | BODY MASS INDEX: 27.66 KG/M2

## 2023-02-15 DIAGNOSIS — R05.8 PRODUCTIVE COUGH: ICD-10-CM

## 2023-02-15 DIAGNOSIS — Z00.00 ENCOUNTER FOR MEDICARE ANNUAL WELLNESS EXAM: Primary | ICD-10-CM

## 2023-02-15 PROCEDURE — 99397 PER PM REEVAL EST PAT 65+ YR: CPT | Performed by: FAMILY MEDICINE

## 2023-02-15 RX ORDER — AZITHROMYCIN 250 MG/1
TABLET, FILM COATED ORAL
Qty: 12 TABLET | Refills: 0 | Status: SHIPPED | OUTPATIENT
Start: 2023-02-15 | End: 2023-02-20

## 2023-02-15 ASSESSMENT — ENCOUNTER SYMPTOMS
DIZZINESS: 0
HEMATURIA: 0
ABDOMINAL PAIN: 0
PALPITATIONS: 0
ARTHRALGIAS: 0
DIARRHEA: 0
FREQUENCY: 1
CONSTIPATION: 0
FEVER: 0
CHILLS: 0
HEADACHES: 0
DYSURIA: 0
HEARTBURN: 0
PARESTHESIAS: 0
JOINT SWELLING: 0
EYE PAIN: 0
NERVOUS/ANXIOUS: 0
WEAKNESS: 0
COUGH: 1
NAUSEA: 0
HEMATOCHEZIA: 0
SHORTNESS OF BREATH: 0
SORE THROAT: 1
MYALGIAS: 0

## 2023-02-15 ASSESSMENT — PAIN SCALES - GENERAL: PAINLEVEL: NO PAIN (0)

## 2023-02-15 ASSESSMENT — ACTIVITIES OF DAILY LIVING (ADL): CURRENT_FUNCTION: NO ASSISTANCE NEEDED

## 2023-04-12 DIAGNOSIS — I10 HYPERTENSION GOAL BP (BLOOD PRESSURE) < 140/90: ICD-10-CM

## 2023-04-12 RX ORDER — HYDROCHLOROTHIAZIDE 25 MG/1
25 TABLET ORAL DAILY
Qty: 90 TABLET | Refills: 2 | Status: SHIPPED | OUTPATIENT
Start: 2023-04-12 | End: 2023-06-28

## 2023-04-25 NOTE — TELEPHONE ENCOUNTER
Incoming call from pt's wife, Kaley. See Consent to Communicate form giving authorization to discuss PHI with Kaley, scanned on 9/17/15.    Kaley states she called the number she was advised to in an attempt to schedule pt's General Surgery appointment, and staff would not schedule the appointment without knowing the specific name of surgeon pt is being referred to. RN advised that the referral was completed as required by the provider including the specialty, location, and dx pt is being referred for. RN advised she call back and attempt to schedule 1 additional time and ask the  to view the referral if they have any questions. RN advised if she still has any problems on the 2nd attempt to schedule, to please reply via the [employer] message below with what else is needed and message can be forwarded to Dr. Rushing or the General Surgery Care Team to assist. Pt indicates understanding of issues and agrees with the plan.    Per review of future appointments, the pt is now scheduled in the appropriate department as referred.    DARLINE Solis, RN      
no

## 2023-06-19 DIAGNOSIS — Z82.49 FAMILY HISTORY OF CORONARY ARTERY DISEASE: ICD-10-CM

## 2023-06-19 DIAGNOSIS — R93.1 ELEVATED CORONARY ARTERY CALCIUM SCORE: ICD-10-CM

## 2023-06-19 DIAGNOSIS — I10 HYPERTENSION GOAL BP (BLOOD PRESSURE) < 140/90: ICD-10-CM

## 2023-06-19 DIAGNOSIS — I25.10 CAD (CORONARY ARTERY DISEASE): ICD-10-CM

## 2023-06-22 RX ORDER — ROSUVASTATIN CALCIUM 10 MG/1
TABLET, COATED ORAL
Qty: 90 TABLET | Refills: 0 | Status: SHIPPED | OUTPATIENT
Start: 2023-06-22 | End: 2023-06-28

## 2023-06-22 RX ORDER — SPIRONOLACTONE 25 MG/1
TABLET ORAL
Qty: 90 TABLET | Refills: 0 | Status: SHIPPED | OUTPATIENT
Start: 2023-06-22 | End: 2023-06-28

## 2023-06-22 NOTE — TELEPHONE ENCOUNTER
Signed Prescriptions:                        Disp   Refills    spironolactone (ALDACTONE) 25 MG tablet    90 tab*0        Sig: TAKE 1 TABLET BY MOUTH DAILY  Authorizing Provider: CHHAYA GORDON  Ordering User: HELGA HECTOR    rosuvastatin (CRESTOR) 10 MG tablet        90 tab*0        Sig: TAKE 1 TABLET BY MOUTH DAILY  Authorizing Provider: CHHAYA GORDON  Ordering User: HELGA HECTOR    Last Comprehensive Metabolic Panel:  Lab Results   Component Value Date     08/19/2022    POTASSIUM 4.0 08/19/2022    CHLORIDE 105 08/19/2022    CO2 28 08/19/2022    ANIONGAP 6 08/19/2022     (H) 08/19/2022    BUN 18 08/19/2022    CR 1.30 (H) 08/19/2022    GFRESTIMATED 59 (L) 08/19/2022    ALIZA 10.0 08/19/2022       Patient is due for fasting labs-cholesterol and kidney.  Please contact patient to schedule this appointment lab only    Helga Hector, RN  Cardiology Care Coordinator  [facility]  [phone] option 1

## 2023-06-22 NOTE — TELEPHONE ENCOUNTER
spironolactone (ALDACTONE) 25 MG tablet 90 tablet 3 8/12/2022         Last Written Prescription Date:  8-  Last Fill Quantity: 90,   # refills: 3  Last Office Visit : 12-9-20222  Future Office visit:  NONE    Routing refill request to provider for review/approval because:  Creat overdue (and elevated).      Kathleen M Doege RN

## 2023-06-24 ENCOUNTER — TELEPHONE (OUTPATIENT)
Dept: FAMILY MEDICINE | Facility: CLINIC | Age: 73
End: 2023-06-24
Payer: COMMERCIAL

## 2023-06-24 DIAGNOSIS — R05.8 PRODUCTIVE COUGH: ICD-10-CM

## 2023-06-24 NOTE — TELEPHONE ENCOUNTER
Reason for Call:  Appointment Request    Patient requesting this type of appt: Chronic Diease Management/Medication/Follow-Up    Requested provider: Sancho Rushing    Reason patient unable to be scheduled: Not within requested timeframe    When does patient want to be seen/preferred time: 3-7 days    Comments: Chest Congestion and chest infection    [facility] or would you prefer to receive a phone call?:   Patient would prefer a phone call   Okay to leave a detailed message?: Yes at Cell number on file:    Telephone Information:   Mobile [phone]       Call taken on 6/24/2023 at 11:33 AM by OMA REYES

## 2023-06-25 ENCOUNTER — TELEPHONE (OUTPATIENT)
Dept: CARDIOLOGY | Facility: CLINIC | Age: 73
End: 2023-06-25
Payer: COMMERCIAL

## 2023-06-25 DIAGNOSIS — I10 HYPERTENSION GOAL BP (BLOOD PRESSURE) < 140/90: ICD-10-CM

## 2023-06-26 NOTE — TELEPHONE ENCOUNTER
RN's can you please triage. Not sure how soon patient needs to be seen, and Dr Rushing does not have any openings on Wednesday when he is here. Thank you.India Grajeda MA/ARIEL

## 2023-06-26 NOTE — TELEPHONE ENCOUNTER
Patient returned call from earlier.  States he is a long time patient of Dr. Santoyo and would like to get a Azithromycin pack.  Stated he was internationally traveling and returned on 6/16 and developed a cough/congestion in his chest.  States he coughing up yellow milky stuff and has had this problem since he was young and has scar tissue from then.  States he is coughing 24-7 and it comes in spurts.  Denies blood in the sputum, pain, trouble breathing, shortness of breath, fever or chills.      Patient is wondering if he can just be prescribed the Azithromycin (2-3 packs worth) or if he needs to be seen.    Kristina Kjellberg, MSN, RN

## 2023-06-26 NOTE — TELEPHONE ENCOUNTER
Called pt and LVM for pt to return call to clinic scheduler to schedule fasting lab work for medication refills.    CHAO Figueroa

## 2023-06-26 NOTE — TELEPHONE ENCOUNTER
"Returned call to number in chart. Spouse, So answered. CTC is signed and on file to speak with her. Patient not present with spouse at time of call.  Requested to speak with patient directly for further triage of \"chest congestion and chest infection\".  Spouse asking if patient can be \"squeezed\" in to provider schedule on Wednesday. No available openings at this time. Spouse asking if provider can just send in Rx for Azithromycin as she notes patient has this \"chest congestion\" often, usually after traveling or flying, typically gets this prescription and it works well for patient.  Writer noted that is not customary for provider to send in prescription for antibiotic treatment without some form of evaluation of symptoms by a provider. A visit would be needed for this request.  Spouse asked for patient to be contacted on cell #  ([phone]) then to speak with patient directly.      This writer attempted to contact Derrick on 06/26/23      Reason for call triage \"chest congestion and chest infection\"  and unable to leave message, rang several times and then disconnected.      If patient calls back:   Registered Nurse called. Follow Triage Call workflow          Kelsey Madison RN  Clinical Triage/Primary Care  [facility]      "

## 2023-06-27 NOTE — TELEPHONE ENCOUNTER
Checked patient chart; patient returned call and scheduled labs for 7/14/23. Patient also has follow-up with PCP on 7/14/23.    CHAO Figueroa

## 2023-06-28 ENCOUNTER — ANCILLARY PROCEDURE (OUTPATIENT)
Dept: GENERAL RADIOLOGY | Facility: CLINIC | Age: 73
End: 2023-06-28
Attending: FAMILY MEDICINE
Payer: COMMERCIAL

## 2023-06-28 ENCOUNTER — OFFICE VISIT (OUTPATIENT)
Dept: FAMILY MEDICINE | Facility: CLINIC | Age: 73
End: 2023-06-28
Payer: COMMERCIAL

## 2023-06-28 VITALS
DIASTOLIC BLOOD PRESSURE: 77 MMHG | SYSTOLIC BLOOD PRESSURE: 130 MMHG | WEIGHT: 199 LBS | RESPIRATION RATE: 20 BRPM | HEIGHT: 71 IN | HEART RATE: 60 BPM | TEMPERATURE: 97.7 F | BODY MASS INDEX: 27.86 KG/M2 | OXYGEN SATURATION: 97 %

## 2023-06-28 DIAGNOSIS — N40.0 BENIGN NON-NODULAR PROSTATIC HYPERPLASIA WITHOUT LOWER URINARY TRACT SYMPTOMS: ICD-10-CM

## 2023-06-28 DIAGNOSIS — I10 HYPERTENSION GOAL BP (BLOOD PRESSURE) < 140/90: ICD-10-CM

## 2023-06-28 DIAGNOSIS — I47.10 SUPRAVENTRICULAR TACHYCARDIA (H): Primary | ICD-10-CM

## 2023-06-28 DIAGNOSIS — Z82.49 FAMILY HISTORY OF CORONARY ARTERY DISEASE: ICD-10-CM

## 2023-06-28 DIAGNOSIS — R05.3 CHRONIC COUGH: ICD-10-CM

## 2023-06-28 DIAGNOSIS — R93.1 ELEVATED CORONARY ARTERY CALCIUM SCORE: ICD-10-CM

## 2023-06-28 LAB
ALBUMIN SERPL BCG-MCNC: 4.8 G/DL (ref 3.5–5.2)
ALP SERPL-CCNC: 97 U/L (ref 40–129)
ALT SERPL W P-5'-P-CCNC: 23 U/L (ref 0–70)
ANION GAP SERPL CALCULATED.3IONS-SCNC: 12 MMOL/L (ref 7–15)
AST SERPL W P-5'-P-CCNC: 28 U/L (ref 0–45)
BILIRUB SERPL-MCNC: 1 MG/DL
BUN SERPL-MCNC: 22.5 MG/DL (ref 8–23)
CALCIUM SERPL-MCNC: 10.1 MG/DL (ref 8.8–10.2)
CHLORIDE SERPL-SCNC: 104 MMOL/L (ref 98–107)
CHOLEST SERPL-MCNC: 153 MG/DL
CREAT SERPL-MCNC: 1.28 MG/DL (ref 0.67–1.17)
DEPRECATED HCO3 PLAS-SCNC: 25 MMOL/L (ref 22–29)
GFR SERPL CREATININE-BSD FRML MDRD: 59 ML/MIN/1.73M2
GLUCOSE SERPL-MCNC: 107 MG/DL (ref 70–99)
HDLC SERPL-MCNC: 57 MG/DL
LDLC SERPL CALC-MCNC: 79 MG/DL
NONHDLC SERPL-MCNC: 96 MG/DL
POTASSIUM SERPL-SCNC: 4.2 MMOL/L (ref 3.4–5.3)
PROT SERPL-MCNC: 7.1 G/DL (ref 6.4–8.3)
SODIUM SERPL-SCNC: 141 MMOL/L (ref 136–145)
TRIGL SERPL-MCNC: 83 MG/DL

## 2023-06-28 PROCEDURE — 80053 COMPREHEN METABOLIC PANEL: CPT | Performed by: FAMILY MEDICINE

## 2023-06-28 PROCEDURE — 84244 ASSAY OF RENIN: CPT | Mod: 90 | Performed by: FAMILY MEDICINE

## 2023-06-28 PROCEDURE — 36415 COLL VENOUS BLD VENIPUNCTURE: CPT | Performed by: FAMILY MEDICINE

## 2023-06-28 PROCEDURE — 99214 OFFICE O/P EST MOD 30 MIN: CPT | Performed by: FAMILY MEDICINE

## 2023-06-28 PROCEDURE — 71046 X-RAY EXAM CHEST 2 VIEWS: CPT | Mod: TC | Performed by: RADIOLOGY

## 2023-06-28 PROCEDURE — 82088 ASSAY OF ALDOSTERONE: CPT | Performed by: FAMILY MEDICINE

## 2023-06-28 PROCEDURE — 80061 LIPID PANEL: CPT | Performed by: FAMILY MEDICINE

## 2023-06-28 PROCEDURE — 99000 SPECIMEN HANDLING OFFICE-LAB: CPT | Performed by: FAMILY MEDICINE

## 2023-06-28 RX ORDER — HYDROCHLOROTHIAZIDE 25 MG/1
25 TABLET ORAL DAILY
Qty: 90 TABLET | Refills: 3 | Status: SHIPPED | OUTPATIENT
Start: 2023-06-28 | End: 2024-07-07

## 2023-06-28 RX ORDER — AMLODIPINE BESYLATE 5 MG/1
5 TABLET ORAL DAILY
Qty: 90 TABLET | Refills: 3 | Status: SHIPPED | OUTPATIENT
Start: 2023-06-28

## 2023-06-28 RX ORDER — SPIRONOLACTONE 25 MG/1
25 TABLET ORAL DAILY
Qty: 90 TABLET | Refills: 3 | Status: SHIPPED | OUTPATIENT
Start: 2023-06-28 | End: 2024-07-07

## 2023-06-28 RX ORDER — FINASTERIDE 5 MG/1
1 TABLET, FILM COATED ORAL DAILY
Qty: 90 TABLET | Refills: 3 | Status: SHIPPED | OUTPATIENT
Start: 2023-06-28 | End: 2024-07-07

## 2023-06-28 RX ORDER — AZITHROMYCIN 250 MG/1
TABLET, FILM COATED ORAL
Qty: 6 TABLET | Refills: 0 | Status: SHIPPED | OUTPATIENT
Start: 2023-06-28 | End: 2023-08-18

## 2023-06-28 RX ORDER — AMLODIPINE BESYLATE 2.5 MG/1
2.5 TABLET ORAL DAILY
Qty: 90 TABLET | Refills: 3 | Status: SHIPPED | OUTPATIENT
Start: 2023-06-28 | End: 2024-07-07

## 2023-06-28 RX ORDER — ROSUVASTATIN CALCIUM 10 MG/1
10 TABLET, COATED ORAL DAILY
Qty: 90 TABLET | Refills: 3 | Status: SHIPPED | OUTPATIENT
Start: 2023-06-28 | End: 2024-07-07

## 2023-06-28 ASSESSMENT — ENCOUNTER SYMPTOMS: COUGH: 1

## 2023-06-28 ASSESSMENT — PAIN SCALES - GENERAL: PAINLEVEL: NO PAIN (0)

## 2023-06-28 NOTE — PROGRESS NOTES
Subjective   Derrick is a 72 year old, presenting for the following health issues:  Cough and URI        6/28/2023     8:01 AM   Additional Questions   Roomed by Aspen DOW   Accompanied by self     Cough    URI  Associated symptoms include coughing.   History of Present Illness       Reason for visit:  Med check    He eats 0-1 servings of fruits and vegetables daily.He consumes 0 sweetened beverage(s) daily.He exercises with enough effort to increase his heart rate 30 to 60 minutes per day.  He exercises with enough effort to increase his heart rate 3 or less days per week.   He is taking medications regularly.   SUBJECTIVE: cough for one one week and had had similar problem 3 months ago.  Both times he has traveled to the [place] spending 14 hours in an airplane one way.  Cough is productive but no fever   SUBJECTIVE:  72 year oldyear old male enters with  hypertension.  Pt. Has been compliant with medications and medications were reviewed.  No side effects. No chest pain or sob. Low sodium diet.    Current Outpatient Medications:      amLODIPine (NORVASC) 2.5 MG tablet, Take 1 tablet (2.5 mg) by mouth daily, Disp: 90 tablet, Rfl: 3     amLODIPine (NORVASC) 5 MG tablet, Take 1 tablet (5 mg) by mouth daily, Disp: 90 tablet, Rfl: 3     aspirin (ASA) 81 MG EC tablet, Take 1 tablet (81 mg) by mouth daily, Disp: 90 tablet, Rfl: 3     azithromycin (ZITHROMAX) 250 MG tablet, Two tablets first day, then one tablet daily for four days., Disp: 6 tablet, Rfl: 0     finasteride (PROSCAR) 5 MG tablet, Take 1 tablet (5 mg) by mouth daily, Disp: 90 tablet, Rfl: 3     hydrochlorothiazide (HYDRODIURIL) 25 MG tablet, Take 1 tablet (25 mg) by mouth daily, Disp: 90 tablet, Rfl: 3     multivitamin w/minerals (THERA-VIT-M) tablet, Take 1 tablet by mouth daily., Disp: , Rfl:      rosuvastatin (CRESTOR) 10 MG tablet, Take 1 tablet (10 mg) by mouth daily, Disp: 90 tablet, Rfl: 3     spironolactone (ALDACTONE) 25 MG tablet, Take 1 tablet  "(25 mg) by mouth daily, Disp: 90 tablet, Rfl: 3  Past Medical History:   Diagnosis Date     HTN (hypertension)      Hypercholesteremia      Leucopenia      Lab on 08/19/2022   Component Date Value Ref Range Status     Sodium 08/19/2022 139  133 - 144 mmol/L Final     Potassium 08/19/2022 4.0  3.4 - 5.3 mmol/L Final     Chloride 08/19/2022 105  94 - 109 mmol/L Final     Carbon Dioxide (CO2) 08/19/2022 28  20 - 32 mmol/L Final     Anion Gap 08/19/2022 6  3 - 14 mmol/L Final     Urea Nitrogen 08/19/2022 18  7 - 30 mg/dL Final     Creatinine 08/19/2022 1.30 (H)  0.66 - 1.25 mg/dL Final     Calcium 08/19/2022 10.0  8.5 - 10.1 mg/dL Final     Glucose 08/19/2022 105 (H)  70 - 99 mg/dL Final     GFR Estimate 08/19/2022 59 (L)  >60 mL/min/1.73m2 Final    Effective December 21, 2021 eGFRcr in adults is calculated using the 2021 CKD-EPI creatinine equation which includes age and gender (Jessy arce al., [place], DOI: 10.1056/CDALln7321627)      Reviewed health maintenance  Patient Active Problem List   Diagnosis     HYPERLIPIDEMIA LDL GOAL <130     Hypertension goal BP (blood pressure) < 140/90     Advanced directives, counseling/discussion     Benign non-nodular prostatic hyperplasia without lower urinary tract symptoms     Impacted cerumen of left ear     Supraventricular tachycardia (H)         OBJECTIVE:  no apparent distress  /77   Pulse 60   Temp 97.7  F (36.5  C) (Tympanic)   Resp 20   Ht 1.803 m (5' 11\")   Wt 90.3 kg (199 lb)   SpO2 97%   BMI 27.75 kg/m       Head: Normocephalic. No masses, lesions, tenderness or abnormalities.  Neck::Neck supple. No adenopathy. Thyroid symmetric, normal size.    Cardiovascular: negative. No lifts, heaves, or thrills. RRR. No murmurs, clicks gallops or rubs  Respiratory. Good diaphragmatic excursion. Lungs clear  Gastrointestinal:Abdomen soft, non-tender.  No masses, organomegaly    Lab on 08/19/2022   Component Date Value Ref Range Status     Sodium 08/19/2022 139  133 - 144 " mmol/L Final     Potassium 08/19/2022 4.0  3.4 - 5.3 mmol/L Final     Chloride 08/19/2022 105  94 - 109 mmol/L Final     Carbon Dioxide (CO2) 08/19/2022 28  20 - 32 mmol/L Final     Anion Gap 08/19/2022 6  3 - 14 mmol/L Final     Urea Nitrogen 08/19/2022 18  7 - 30 mg/dL Final     Creatinine 08/19/2022 1.30 (H)  0.66 - 1.25 mg/dL Final     Calcium 08/19/2022 10.0  8.5 - 10.1 mg/dL Final     Glucose 08/19/2022 105 (H)  70 - 99 mg/dL Final     GFR Estimate 08/19/2022 59 (L)  >60 mL/min/1.73m2 Final    Effective December 21, 2021 eGFRcr in adults is calculated using the 2021 CKD-EPI creatinine equation which includes age and gender (Jessy et al., NE, DOI: 10.1056/EWDIup8676755)     SUBJECTIVE:  72 year old enters for recheck of high cholesterol.  Pt. Has been taking med and has no side effects. Pt is following diet.  Denies chest pain and SOB.  Past Medical History:   Diagnosis Date     HTN (hypertension)      Hypercholesteremia      Leucopenia      Past Surgical History:   Procedure Laterality Date     NO HISTORY OF SURGERY         Current Outpatient Medications:      amLODIPine (NORVASC) 2.5 MG tablet, Take 1 tablet (2.5 mg) by mouth daily, Disp: 90 tablet, Rfl: 3     amLODIPine (NORVASC) 5 MG tablet, Take 1 tablet (5 mg) by mouth daily, Disp: 90 tablet, Rfl: 3     aspirin (ASA) 81 MG EC tablet, Take 1 tablet (81 mg) by mouth daily, Disp: 90 tablet, Rfl: 3     azithromycin (ZITHROMAX) 250 MG tablet, Two tablets first day, then one tablet daily for four days., Disp: 6 tablet, Rfl: 0     finasteride (PROSCAR) 5 MG tablet, Take 1 tablet (5 mg) by mouth daily, Disp: 90 tablet, Rfl: 3     hydrochlorothiazide (HYDRODIURIL) 25 MG tablet, Take 1 tablet (25 mg) by mouth daily, Disp: 90 tablet, Rfl: 3     multivitamin w/minerals (THERA-VIT-M) tablet, Take 1 tablet by mouth daily., Disp: , Rfl:      rosuvastatin (CRESTOR) 10 MG tablet, Take 1 tablet (10 mg) by mouth daily, Disp: 90 tablet, Rfl: 3     spironolactone  "(ALDACTONE) 25 MG tablet, Take 1 tablet (25 mg) by mouth daily, Disp: 90 tablet, Rfl: 3  Reviewed health maintenance   Patient Active Problem List   Diagnosis     HYPERLIPIDEMIA LDL GOAL <130     Hypertension goal BP (blood pressure) < 140/90     Advanced directives, counseling/discussion     Benign non-nodular prostatic hyperplasia without lower urinary tract symptoms     Impacted cerumen of left ear     Supraventricular tachycardia (H)       OBJECTIVE:  no apparent distress  /77   Pulse 60   Temp 97.7  F (36.5  C) (Tympanic)   Resp 20   Ht 1.803 m (5' 11\")   Wt 90.3 kg (199 lb)   SpO2 97%   BMI 27.75 kg/m        Exam:  Constitutional: healthy, alert and no distress  Head: Normocephalic. No masses, lesions, tenderness or abnormalities  Neck: Neck supple. No adenopathy. Thyroid symmetric, normal size,  Cardiovascular: negative, PMI normal. No lifts, heaves, or thrills. RRR. No murmurs, clicks gallops or rub  Respiratory: negative Lungs clear    Lab on 08/19/2022   Component Date Value Ref Range Status     Sodium 08/19/2022 139  133 - 144 mmol/L Final     Potassium 08/19/2022 4.0  3.4 - 5.3 mmol/L Final     Chloride 08/19/2022 105  94 - 109 mmol/L Final     Carbon Dioxide (CO2) 08/19/2022 28  20 - 32 mmol/L Final     Anion Gap 08/19/2022 6  3 - 14 mmol/L Final     Urea Nitrogen 08/19/2022 18  7 - 30 mg/dL Final     Creatinine 08/19/2022 1.30 (H)  0.66 - 1.25 mg/dL Final     Calcium 08/19/2022 10.0  8.5 - 10.1 mg/dL Final     Glucose 08/19/2022 105 (H)  70 - 99 mg/dL Final     GFR Estimate 08/19/2022 59 (L)  >60 mL/min/1.73m2 Final    Effective December 21, 2021 eGFRcr in adults is calculated using the 2021 CKD-EPI creatinine equation which includes age and gender (Jessy arce al., NEJ, DOI: 10.1056/XESOck1648398)           ICD-10-CM    1. Supraventricular tachycardia (H)  I47.1       2. Hypertension goal BP (blood pressure) < 140/90  I10 COMPREHENSIVE METABOLIC PANEL     amLODIPine (NORVASC) 2.5 MG tablet "     amLODIPine (NORVASC) 5 MG tablet     hydrochlorothiazide (HYDRODIURIL) 25 MG tablet     rosuvastatin (CRESTOR) 10 MG tablet     spironolactone (ALDACTONE) 25 MG tablet     Lipid panel reflex to direct LDL Fasting     Aldosterone     Renin activity     Aldosterone Renin Ratio     Lipid panel reflex to direct LDL Fasting     COMPREHENSIVE METABOLIC PANEL     Aldosterone Renin Ratio     CANCELED: Basic metabolic panel     CANCELED: Lipid panel reflex to direct LDL Fasting     CANCELED: Aldosterone     CANCELED: Renin activity      3. Benign non-nodular prostatic hyperplasia without lower urinary tract symptoms  N40.0 finasteride (PROSCAR) 5 MG tablet      4. Family history of coronary artery disease  Z82.49 rosuvastatin (CRESTOR) 10 MG tablet     spironolactone (ALDACTONE) 25 MG tablet     Lipid panel reflex to direct LDL Fasting     CANCELED: Basic metabolic panel      5. Elevated coronary artery calcium score  R93.1 rosuvastatin (CRESTOR) 10 MG tablet     spironolactone (ALDACTONE) 25 MG tablet     Lipid panel reflex to direct LDL Fasting     CANCELED: Basic metabolic panel      6. Chronic cough  R05.3 XR Chest 2 Views     azithromycin (ZITHROMAX) 250 MG tablet      7. CAD (coronary artery disease)  I25.10 Lipid panel reflex to direct LDL Fasting     CANCELED: Basic metabolic panel       PLAN: Follow up in 1 year

## 2023-06-28 NOTE — LETTER
July 3, 2023      Derrickruben Yusuf  [address]  [place] [address]        Dear ,    We are writing to inform you of your test results.    Cholesterol improved.  Kidney function at baseline.     DR EVAN Doe Orders   Lipid panel reflex to direct LDL Fasting   Result Value Ref Range    Cholesterol 153 <200 mg/dL    Triglycerides 83 <150 mg/dL    Direct Measure HDL 57 >=40 mg/dL    LDL Cholesterol Calculated 79 <=100 mg/dL    Non HDL Cholesterol 96 <130 mg/dL    Narrative    Cholesterol  Desirable:  <200 mg/dL    Triglycerides  Normal:  Less than 150 mg/dL  Borderline High:  150-199 mg/dL  High:  200-499 mg/dL  Very High:  Greater than or equal to 500 mg/dL    Direct Measure HDL  Female:  Greater than or equal to 50 mg/dL   Male:  Greater than or equal to 40 mg/dL    LDL Cholesterol  Desirable:  <100mg/dL  Above Desirable:  100-129 mg/dL   Borderline High:  130-159 mg/dL   High:  160-189 mg/dL   Very High:  >= 190 mg/dL    Non HDL Cholesterol  Desirable:  130 mg/dL  Above Desirable:  130-159 mg/dL  Borderline High:  160-189 mg/dL  High:  190-219 mg/dL  Very High:  Greater than or equal to 220 mg/dL   COMPREHENSIVE METABOLIC PANEL   Result Value Ref Range    Sodium 141 136 - 145 mmol/L    Potassium 4.2 3.4 - 5.3 mmol/L    Chloride 104 98 - 107 mmol/L    Carbon Dioxide (CO2) 25 22 - 29 mmol/L    Anion Gap 12 7 - 15 mmol/L    Urea Nitrogen 22.5 8.0 - 23.0 mg/dL    Creatinine 1.28 (H) 0.67 - 1.17 mg/dL    Calcium 10.1 8.8 - 10.2 mg/dL    Glucose 107 (H) 70 - 99 mg/dL    Alkaline Phosphatase 97 40 - 129 U/L    AST 28 0 - 45 U/L      Comment:      Reference intervals for this test were updated on 6/12/2023 to more accurately reflect our healthy population. There may be differences in the flagging of prior results with similar values performed with this method. Interpretation of those prior results can be made in the context of the updated reference intervals.    ALT 23 0 - 70 U/L      Comment:       Reference intervals for this test were updated on 6/12/2023 to more accurately reflect our healthy population. There may be differences in the flagging of prior results with similar values performed with this method. Interpretation of those prior results can be made in the context of the updated reference intervals.      Protein Total 7.1 6.4 - 8.3 g/dL    Albumin 4.8 3.5 - 5.2 g/dL    Bilirubin Total 1.0 <=1.2 mg/dL    GFR Estimate 59 (L) >60 mL/min/1.73m2       If you have any questions or concerns, please call the clinic at the number listed above.       Sincerely,      Sancho Rushing MD

## 2023-06-29 RX ORDER — AMLODIPINE BESYLATE 5 MG/1
5 TABLET ORAL DAILY
OUTPATIENT
Start: 2023-06-29

## 2023-06-30 LAB — ALDOST SERPL-MCNC: 16.7 NG/DL (ref 0–31)

## 2023-07-01 LAB — RENIN PLAS-CCNC: 3.6 NG/ML/HR

## 2023-07-03 LAB — ALDOST/RENIN PLAS-RTO: 4.6 {RATIO} (ref 0–25)

## 2023-07-20 ENCOUNTER — TELEPHONE (OUTPATIENT)
Dept: FAMILY MEDICINE | Facility: CLINIC | Age: 73
End: 2023-07-20
Payer: COMMERCIAL

## 2023-07-20 NOTE — TELEPHONE ENCOUNTER
Patient Quality Outreach    Patient is due for the following:   Physical Annual Wellness Visit      Topic Date Due     Zoster (Shingles) Vaccine (1 of 2) Never done     COVID-19 Vaccine (4 - Moderna series) 01/01/2022       Next Steps:   Schedule a Annual Wellness Visit    Type of outreach:    Sent [employer] message.      Questions for provider review:    None           ORAL COMER MA

## 2023-08-18 ENCOUNTER — TELEPHONE (OUTPATIENT)
Dept: FAMILY MEDICINE | Facility: CLINIC | Age: 73
End: 2023-08-18
Payer: COMMERCIAL

## 2023-08-18 DIAGNOSIS — R05.3 CHRONIC COUGH: ICD-10-CM

## 2023-08-18 RX ORDER — AZITHROMYCIN 250 MG/1
TABLET, FILM COATED ORAL
Qty: 6 TABLET | Refills: 1 | Status: SHIPPED | OUTPATIENT
Start: 2023-08-18 | End: 2023-12-04

## 2023-08-18 NOTE — TELEPHONE ENCOUNTER
Patient's wife is calling. Patient is currently in [place], and is flying home tonight. His wife states that he has a lung infection, and always get ones when flying internationally. She is asking if a rfill on azithromycin, with 1 refill can be sent in.[facility]

## 2023-09-14 NOTE — TELEPHONE ENCOUNTER
[facility]    Phone Message    May a detailed message be left on voicemail: yes     Reason for Call: Requesting Results   Name/type of test: CT Calcium scan  Date of test: 08.17.22  Was test done at a location other than [facility] (Please fill in the location if not [facility])?: No    So called to request a call back from Dr Woodson about Derrick's CT calcium scan results. Please reach back out to them at [phone]. Thank you!    Action Taken: Other: Cardiology    Travel Screening: Not Applicable     Topical Sulfur Applications Counseling: Topical Sulfur Counseling: Patient counseled that this medication may cause skin irritation or allergic reactions.  In the event of skin irritation, the patient was advised to reduce the amount of the drug applied or use it less frequently.   The patient verbalized understanding of the proper use and possible adverse effects of topical sulfur application.  All of the patient's questions and concerns were addressed.

## 2023-12-04 ENCOUNTER — MYC MEDICAL ADVICE (OUTPATIENT)
Dept: FAMILY MEDICINE | Facility: CLINIC | Age: 73
End: 2023-12-04
Payer: COMMERCIAL

## 2023-12-11 DIAGNOSIS — R05.3 CHRONIC COUGH: ICD-10-CM

## 2023-12-11 RX ORDER — AZITHROMYCIN 250 MG/1
TABLET, FILM COATED ORAL
Qty: 12 TABLET | Refills: 1 | Status: SHIPPED | OUTPATIENT
Start: 2023-12-11

## 2024-01-05 DIAGNOSIS — Z12.11 COLON CANCER SCREENING: ICD-10-CM

## 2024-01-19 ENCOUNTER — ORDERS ONLY (AUTO-RELEASED) (OUTPATIENT)
Dept: FAMILY MEDICINE | Facility: CLINIC | Age: 74
End: 2024-01-19
Payer: COMMERCIAL

## 2024-01-19 DIAGNOSIS — Z12.11 COLON CANCER SCREENING: ICD-10-CM

## 2024-02-05 ENCOUNTER — TELEPHONE (OUTPATIENT)
Dept: CARDIOLOGY | Facility: CLINIC | Age: 74
End: 2024-02-05
Payer: COMMERCIAL

## 2024-02-05 NOTE — TELEPHONE ENCOUNTER
[facility]    Phone Message    May a detailed message be left on voicemail: yes     Reason for Call: Other: Pt's wife is requesting a j carlos back to discuss reaction to medications, rash started on back and has moved over his body.     Action Taken: Other: cardio    Travel Screening: Not Applicable    Thank you!  [facility]

## 2024-02-06 ENCOUNTER — TELEPHONE (OUTPATIENT)
Dept: CARDIOLOGY | Facility: CLINIC | Age: 74
End: 2024-02-06
Payer: COMMERCIAL

## 2024-02-06 NOTE — TELEPHONE ENCOUNTER
Called and spoke with patient. See other telephone encounter.    Sylvia Verduzco, RN, BSN  Cardiology RN Care Coordinator   [place]   Phone: [phone]  Fax: [phone] ([place]) [phone] ([place])

## 2024-02-06 NOTE — TELEPHONE ENCOUNTER
[facility]    Phone Message    May a detailed message be left on voicemail: yes     Reason for Call: Other: Please have Sylvia return call to [place]      Action Taken: Other: cardio    Travel Screening: Not Applicable

## 2024-02-06 NOTE — TELEPHONE ENCOUNTER
Joya Woodson MD  You1 hour ago (11:44 AM)     IC  No new recommendation. Agree. Thanks.     You  Joya Woodson MD2 hours ago (10:31 AM)     CK  Patient coming in on 2/28. He did develop a rash over the past week and was wondering if it could be related to a medication. No recent mediation changes. I did encourage them to reach out to PCP for further evaluation. Patient's wife requested that you be updated as well. Let me know if you recommend anything else.     Attempted to call patient. Left message for patient.    Sylvia Verduzco, RN, BSN  Cardiology RN Care Coordinator   [place]   Phone: [phone]  Fax: [phone] ([place]) [phone] ([place])

## 2024-02-06 NOTE — TELEPHONE ENCOUNTER
Called and spoke with patient's wife. Patient developed a rash in the past week. Location of rash is along the shoulders that has also appeared on arms. Patient with no other symptoms at this time. Patient has not had any recent medication changes. Wife is wondering if rash is related to a medication.    Informed patient's wife that provider would be updated, but encouraged patient to also reach out to PCP as well for further evaluation. Patient is scheduled for follow up with Dr. Woodson on 2/28/24.     Sylvia Verduzco RN, BSN  Cardiology RN Care Coordinator   [place]   Phone: [phone]  Fax: [phone] ([place]) [phone] ([place])

## 2024-02-10 ENCOUNTER — HEALTH MAINTENANCE LETTER (OUTPATIENT)
Age: 74
End: 2024-02-10

## 2024-02-20 LAB — NONINV COLON CA DNA+OCC BLD SCRN STL QL: NORMAL

## 2024-03-25 LAB — NONINV COLON CA DNA+OCC BLD SCRN STL QL: NORMAL

## 2024-05-29 ENCOUNTER — PATIENT OUTREACH (OUTPATIENT)
Dept: FAMILY MEDICINE | Facility: CLINIC | Age: 74
End: 2024-05-29
Payer: COMMERCIAL

## 2024-05-29 NOTE — TELEPHONE ENCOUNTER
Patient Quality Outreach    Patient is due for the following:   Colon Cancer Screening  Physical Annual Wellness Visit      Topic Date Due    Zoster (Shingles) Vaccine (1 of 2) Never done    COVID-19 Vaccine (4 - 2023-24 season) 09/01/2023       Next Steps:   Schedule a Annual Wellness Visit    Type of outreach:    Sent [employer] message.      Questions for provider review:    None           ORAL COMER MA

## 2024-05-30 LAB — NONINV COLON CA DNA+OCC BLD SCRN STL QL: NEGATIVE

## 2024-06-17 PROBLEM — Z71.89 ADVANCED DIRECTIVES, COUNSELING/DISCUSSION: Status: RESOLVED | Noted: 2017-11-07 | Resolved: 2024-06-17

## 2024-06-23 NOTE — PROGRESS NOTES
[facility]      Referring provider: Sancho Rushing MD    HPI: Mr. Erick Yusuf is a 71 year old  male with PMH significant for    -Hypertension  -Family history of premature CAD    Patient was recently seen in primary care clinic by Dr. Rushing on 6/29/2022.  He was found to have irregular heartbeat on EKG.  Subsequently underwent Zio patch for 2 days which showed frequent PACs (19%), 60 episodes of nonsustained SVT longest lasting 12 seconds.  Patient tells me that he did not feel anything unusual when he was wearing the heart monitor.  Denies chest pain, shortness of breath, palpitations, dizziness or syncope.  Reports lower extremity edema more so over the last 1 year (since he started amlodipine).  Reports frequent cough after travels.    Does not smoke.  Alcohol occasional.  No diabetes.  Reports high blood pressure at home.  Blood pressure is also high in clinic today.  He does not monitor BP very frequently.  He has history of hypertension for over 20 years.  Family history is significant for premature CAD.  His brother had CABG in his 40s.     Echocardiogram on 5/6/2022 shows normal biventricular function, mild LVH, no significant valve disease.    Patient is currently on amlodipine 10 mg, hydrochlorothiazide 25 mg and simvastatin 20 mg.    Medications, personal, family, and social history reviewed with patient and revised.    Interval history 12/9/2022  Patient is being seen today to discuss recent test results including CT coronary calcium which showed severe elevated coronary calcium score and to recheck blood pressure control.  Underwent exercise stress echocardiogram 9/8/2022 which was submaximal with no stress-induced ischemia.  Patient was able to achieve only 82% of maximal heart rate.    Patient brought a blood pressure log from home which shows blood pressure in the range of  125-135/70-85 mmHg.  He reports no cardiac symptoms.  He is physically active.    Interval history 6/24/2024:  Patient is being seen today for 2-year visit.  He is asymptomatic from cardiac standpoint.  I reviewed EKG which shows bigeminal PVCs.  Denies palpitations or feeling skipped beats.      Alcohol very occasional.  Does not smoke.  Blood pressure is well-controlled.    PAST MEDICAL HISTORY:  Past Medical History:   Diagnosis Date    HTN (hypertension)     Hypercholesteremia     Leucopenia        CURRENT MEDICATIONS:  Current Outpatient Medications   Medication Sig Dispense Refill    amLODIPine (NORVASC) 2.5 MG tablet Take 1 tablet (2.5 mg) by mouth daily 90 tablet 3    amLODIPine (NORVASC) 5 MG tablet Take 1 tablet (5 mg) by mouth daily 90 tablet 3    aspirin (ASA) 81 MG EC tablet Take 1 tablet (81 mg) by mouth daily 90 tablet 3    azithromycin (ZITHROMAX) 250 MG tablet TAKE 2 TABLETS BY MOUTH TODAY, THEN TAKE 1 TABLET DAILY FOR 4 DAYS 12 tablet 1    finasteride (PROSCAR) 5 MG tablet Take 1 tablet (5 mg) by mouth daily 90 tablet 3    hydrochlorothiazide (HYDRODIURIL) 25 MG tablet Take 1 tablet (25 mg) by mouth daily 90 tablet 3    multivitamin w/minerals (THERA-VIT-M) tablet Take 1 tablet by mouth daily.      rosuvastatin (CRESTOR) 10 MG tablet Take 1 tablet (10 mg) by mouth daily 90 tablet 3    spironolactone (ALDACTONE) 25 MG tablet Take 1 tablet (25 mg) by mouth daily 90 tablet 3       PAST SURGICAL HISTORY:  Past Surgical History:   Procedure Laterality Date    NO HISTORY OF SURGERY         ALLERGIES:     Allergies   Allergen Reactions    Prednisone Swelling     Al over severe body swelling       FAMILY HISTORY:  Family History   Problem Relation Age of Onset    Diabetes Father     Heart Disease Brother          SOCIAL HISTORY:  Social History     Tobacco Use    Smoking status: Never    Smokeless tobacco: Never   Vaping Use    Vaping status: Never Used   Substance Use Topics    Alcohol use: Not Currently      Comment: occasionally    Drug use: No       ROS:   Constitutional: No fever, chills, or sweats. Weight stable.   Cardiovascular: As per HPI.       Exam:  /78 (BP Location: Right arm, Patient Position: Sitting, Cuff Size: Adult Regular)   Pulse 69   Wt 90.8 kg (200 lb 3.2 oz)   SpO2 94%   BMI 27.92 kg/m    GENERAL APPEARANCE: alert and no distress  HEENT: no icterus, no central cyanosis  CARDIOVASCULAR: regular rhythm, normal S1, S2, no S3 or S4 and no murmur, click or rub, precordium quiet with normal PMI.  EXTREMITIES: Trace pretibial edema on both sides.  NEURO: alert, normal speech,and affect  SKIN: no ecchymoses, no rashes     I have reviewed the labs and personally reviewed the imaging below and made my comment in the assessment and plan.    Labs:  CBC RESULTS:   Lab Results   Component Value Date    WBC 4.7 09/17/2015    RBC 4.75 09/17/2015    HGB 14.7 09/17/2015    HCT 42.8 09/17/2015    MCV 90 09/17/2015    MCH 30.9 09/17/2015    MCHC 34.3 09/17/2015    RDW 13.2 09/17/2015     (L) 09/17/2015       BMP RESULTS:  Lab Results   Component Value Date     06/28/2023     03/01/2021    POTASSIUM 4.2 06/28/2023    POTASSIUM 4.0 08/19/2022    POTASSIUM 3.7 03/01/2021    CHLORIDE 104 06/28/2023    CHLORIDE 105 08/19/2022    CHLORIDE 105 03/01/2021    CO2 25 06/28/2023    CO2 28 08/19/2022    CO2 31 03/01/2021    ANIONGAP 12 06/28/2023    ANIONGAP 6 08/19/2022    ANIONGAP 3 03/01/2021     (H) 06/28/2023     (H) 08/19/2022     (H) 03/01/2021    BUN 22.5 06/28/2023    BUN 18 08/19/2022    BUN 17 03/01/2021    CR 1.28 (H) 06/28/2023    CR 1.10 03/01/2021    GFRESTIMATED 59 (L) 06/28/2023    GFRESTIMATED 68 03/01/2021    GFRESTBLACK 78 03/01/2021    ALIZA 10.1 06/28/2023    ALIZA 9.6 03/01/2021 6/29/22: 3 day zio:   SR with 60 runs SVT- longest 13 seconds-rte 101 bpm  PAC- 20% burden  Frequent AT      Exercise stress echocardiogram 9/8/2022  Near maximal stress test,  achieved 82% of the age predicted maximal heart  rate. Test stopped due to hypertension.     No angina symptoms with exercise.  No ECG evidence of ischemia.  Normal segmental and global LV function with EF of approximately 55-60% at  rest; with exercise left ventricular cavity size decreases and LVEF increases  to 60-65%.  No stress induced regional wall motion abnormalities.  Average functional capacity for age.     No significant valvular dysfunction noted on screening 2D and Doppler  Examination.    CT calcium screening 8/17/2022  Severe coronary artery calcification total score 1425 placing the patient in the 89th percentile.    5/6/22: Echo  Interpretation Summary    Global and regional left ventricular function is normal with an EF of 60-65%.  Left ventricular diastolic function is normal.  Mild concentric wall thickening consistent with left ventricular hypertrophy  is present.  Global right ventricular function is normal.  No significant valvular abnormalities.  No pericardial effusion is present.  Dilation of the inferior vena cava is present with abnormal respiratory  variation in diameter suggests a high RA pressure estimated at 15 mmHg or  greater.    There is no prior study for direct comparison.      EKG 6/29/2022 personally reviewed shows sinus rhythm with occasional PVC.    Assessment and Plan:     # Asymptomatic frequent PACs and nonsustained SVT (19.7% burden as assessed by Zio patch 6/29/2022)  -Frequent PACs (today's EKG shows bigeminal PACs) still present on physical exam and EKG in clinic today.  Echocardiogram in 2022 showed with mild LVH and normal LVEF, no valve disease  -Patient asymptomatic.  -Recommended self monitoring with Apple Watch for incidental A-fib as he is at high risk given high PACs    #Hypertension  #Family history of premature CAD  #Severe coronary calcifications on CT calcium screening 8/17/2022 (total calcium score 1425)  -Patient is asymptomatic.  Blood pressure  well-controlled.  Continue spironolactone 25, hydrochlorothiazide 25, amlodipine 7.5 mg.  Continue Crestor 10 mg and aspirin 81 mg  -Basic labs ordered today including CMP, CBC and hemoglobin A1c.  Patient also requestED PSA test.    Return to clinic 2 year.  No medication changes today.    Total time spent today for this visit is 47 minutes including precharting, face-to-face clinic visit, review of labs/imaging and medical documentation.      Joya GORDON MD  [facility] Division of Cardiology  Pager [phone]

## 2024-06-24 ENCOUNTER — OFFICE VISIT (OUTPATIENT)
Dept: CARDIOLOGY | Facility: CLINIC | Age: 74
End: 2024-06-24
Payer: COMMERCIAL

## 2024-06-24 VITALS
OXYGEN SATURATION: 94 % | WEIGHT: 200.2 LBS | SYSTOLIC BLOOD PRESSURE: 132 MMHG | HEART RATE: 69 BPM | DIASTOLIC BLOOD PRESSURE: 78 MMHG | BODY MASS INDEX: 27.92 KG/M2

## 2024-06-24 DIAGNOSIS — I25.10 CORONARY ARTERY CALCIFICATION SEEN ON CT SCAN: ICD-10-CM

## 2024-06-24 DIAGNOSIS — I49.1 PREMATURE ATRIAL CONTRACTIONS: Primary | ICD-10-CM

## 2024-06-24 DIAGNOSIS — Z12.5 ENCOUNTER FOR SCREENING FOR MALIGNANT NEOPLASM OF PROSTATE: ICD-10-CM

## 2024-06-24 DIAGNOSIS — I10 HYPERTENSION GOAL BP (BLOOD PRESSURE) < 140/90: ICD-10-CM

## 2024-06-24 DIAGNOSIS — Z82.49 FAMILY HISTORY OF CORONARY ARTERY DISEASE: ICD-10-CM

## 2024-06-24 LAB
ERYTHROCYTE [DISTWIDTH] IN BLOOD BY AUTOMATED COUNT: 12.4 % (ref 10–15)
HBA1C MFR BLD: 5.5 % (ref 0–5.6)
HCT VFR BLD AUTO: 44 % (ref 40–53)
HGB BLD-MCNC: 15 G/DL (ref 13.3–17.7)
MCH RBC QN AUTO: 30.9 PG (ref 26.5–33)
MCHC RBC AUTO-ENTMCNC: 34.1 G/DL (ref 31.5–36.5)
MCV RBC AUTO: 91 FL (ref 78–100)
PLATELET # BLD AUTO: 130 10E3/UL (ref 150–450)
RBC # BLD AUTO: 4.86 10E6/UL (ref 4.4–5.9)
WBC # BLD AUTO: 6.7 10E3/UL (ref 4–11)

## 2024-06-24 PROCEDURE — G0103 PSA SCREENING: HCPCS | Performed by: INTERNAL MEDICINE

## 2024-06-24 PROCEDURE — 85027 COMPLETE CBC AUTOMATED: CPT | Performed by: INTERNAL MEDICINE

## 2024-06-24 PROCEDURE — 93000 ELECTROCARDIOGRAM COMPLETE: CPT | Performed by: INTERNAL MEDICINE

## 2024-06-24 PROCEDURE — 80053 COMPREHEN METABOLIC PANEL: CPT | Performed by: INTERNAL MEDICINE

## 2024-06-24 PROCEDURE — 99215 OFFICE O/P EST HI 40 MIN: CPT | Performed by: INTERNAL MEDICINE

## 2024-06-24 PROCEDURE — 83036 HEMOGLOBIN GLYCOSYLATED A1C: CPT | Performed by: INTERNAL MEDICINE

## 2024-06-24 PROCEDURE — 36415 COLL VENOUS BLD VENIPUNCTURE: CPT | Performed by: INTERNAL MEDICINE

## 2024-06-24 NOTE — LETTER
June 24, 2024      Derrick Valenciamirlande  [address]  [place] [address]        Dear ,    We are writing to inform you of your test results.    Normal blood count.     Dr GORDON     Resulted Orders   CBC with platelets   Result Value Ref Range    WBC Count 6.7 4.0 - 11.0 10e3/uL    RBC Count 4.86 4.40 - 5.90 10e6/uL    Hemoglobin 15.0 13.3 - 17.7 g/dL    Hematocrit 44.0 40.0 - 53.0 %    MCV 91 78 - 100 fL    MCH 30.9 26.5 - 33.0 pg    MCHC 34.1 31.5 - 36.5 g/dL    RDW 12.4 10.0 - 15.0 %    Platelet Count 130 (L) 150 - 450 10e3/uL   Hemoglobin A1c   Result Value Ref Range    Hemoglobin A1C 5.5 0.0 - 5.6 %      Comment:      Normal <5.7%   Prediabetes 5.7-6.4%    Diabetes 6.5% or higher     Note: Adopted from ADA consensus guidelines.       If you have any questions or concerns, please call the clinic at the number listed above.       Sincerely,      Joya Gordon MD

## 2024-06-24 NOTE — NURSING NOTE
"Chief Complaint   Patient presents with    Follow Up     Reason for visit: Return General Cardiology for Annual PAC       Initial /78 (BP Location: Right arm, Patient Position: Sitting, Cuff Size: Adult Regular)   Pulse 69   Wt 90.8 kg (200 lb 3.2 oz)   SpO2 94%   BMI 27.92 kg/m   Estimated body mass index is 27.92 kg/m  as calculated from the following:    Height as of 6/28/23: 1.803 m (5' 11\").    Weight as of this encounter: 90.8 kg (200 lb 3.2 oz)..  BP completed using cuff size: regular    CHUCHO Figueroa    "

## 2024-06-24 NOTE — LETTER
June 24, 2024      Derrick DEEPTI Madelin  [address]  [place] [address]        Dear CarlosMadelin,    We are writing to inform you of your test results.    No diabetes.     DR VEAN Doe Orders   Hemoglobin A1c   Result Value Ref Range    Hemoglobin A1C 5.5 0.0 - 5.6 %      Comment:      Normal <5.7%   Prediabetes 5.7-6.4%    Diabetes 6.5% or higher     Note: Adopted from ADA consensus guidelines.       If you have any questions or concerns, please call the clinic at the number listed above.       Sincerely,      Joya Woodson MD

## 2024-06-24 NOTE — PATIENT INSTRUCTIONS
Thank you for coming to the [facility] at [place]; please note the following instructions:    1. Blood test today    2. 2 year follow-up with Dr. Woodson    3. Please continue monitoring your heart rhythm with your Apple Watch. Let us know if you are noticing anything suspicious of atrial fibrillation        If you have any questions regarding your visit, please contact your care team:     CARDIOLOGY  TELEPHONE NUMBER   Helga CANO, Registered Nurse  Sylvia RIDDLE, Registered Nurse  Sommer SANZ, Registered Medical Assistant  Vida ALCARAZ, Certified Medical Assistant  Caitie SALGUERO, Clinic Assistant [phone] (select option 1)    *After hours: [phone]   For Scheduling Appts:     [phone] (select option 1)    *After hours: [phone]   For the Device Clinic (Pacemakers and ICD's)  Bibiana FORDE, Registered Nurse   During business hours: [phone]    *After business hours:  [phone] (select option 4)      Normal test result notifications will be released via brick&mobile or mailed within 7 business days.  All other test results, will be communicated via telephone once reviewed by your cardiologist.    If you need a medication refill, please contact your pharmacy.  Please allow 3 business days for your refill to be completed.    As always, thank you for trusting us with your health care needs!

## 2024-06-24 NOTE — LETTER
6/24/2024      RE: Erick Yusuf  [address]  [place] [address]       Dear Colleague,    Thank you for the opportunity to participate in the care of your patient, Erick Yusuf, at the [facility]. Please see a copy of my visit note below.                                                                                  General Cardiology Clinic-[place]      Referring provider: Sancho Rushing MD    HPI: Mr. Erick Yusuf is a 71 year old  male with PMH significant for    -Hypertension  -Family history of premature CAD    Patient was recently seen in primary care clinic by Dr. Rushing on 6/29/2022.  He was found to have irregular heartbeat on EKG.  Subsequently underwent Zio patch for 2 days which showed frequent PACs (19%), 60 episodes of nonsustained SVT longest lasting 12 seconds.  Patient tells me that he did not feel anything unusual when he was wearing the heart monitor.  Denies chest pain, shortness of breath, palpitations, dizziness or syncope.  Reports lower extremity edema more so over the last 1 year (since he started amlodipine).  Reports frequent cough after travels.    Does not smoke.  Alcohol occasional.  No diabetes.  Reports high blood pressure at home.  Blood pressure is also high in clinic today.  He does not monitor BP very frequently.  He has history of hypertension for over 20 years.  Family history is significant for premature CAD.  His brother had CABG in his 40s.     Echocardiogram on 5/6/2022 shows normal biventricular function, mild LVH, no significant valve disease.    Patient is currently on amlodipine 10 mg, hydrochlorothiazide 25 mg and simvastatin 20 mg.    Medications, personal, family, and social history reviewed with patient and revised.    Interval history 12/9/2022  Patient is being seen today to discuss recent test results including CT coronary calcium which  showed severe elevated coronary calcium score and to recheck blood pressure control.  Underwent exercise stress echocardiogram 9/8/2022 which was submaximal with no stress-induced ischemia.  Patient was able to achieve only 82% of maximal heart rate.    Patient brought a blood pressure log from home which shows blood pressure in the range of 125-135/70-85 mmHg.  He reports no cardiac symptoms.  He is physically active.    Interval history 6/24/2024:  Patient is being seen today for 2-year visit.  He is asymptomatic from cardiac standpoint.  I reviewed EKG which shows bigeminal PVCs.  Denies palpitations or feeling skipped beats.      Alcohol very occasional.  Does not smoke.  Blood pressure is well-controlled.    PAST MEDICAL HISTORY:  Past Medical History:   Diagnosis Date    HTN (hypertension)     Hypercholesteremia     Leucopenia        CURRENT MEDICATIONS:  Current Outpatient Medications   Medication Sig Dispense Refill    amLODIPine (NORVASC) 2.5 MG tablet Take 1 tablet (2.5 mg) by mouth daily 90 tablet 3    amLODIPine (NORVASC) 5 MG tablet Take 1 tablet (5 mg) by mouth daily 90 tablet 3    aspirin (ASA) 81 MG EC tablet Take 1 tablet (81 mg) by mouth daily 90 tablet 3    azithromycin (ZITHROMAX) 250 MG tablet TAKE 2 TABLETS BY MOUTH TODAY, THEN TAKE 1 TABLET DAILY FOR 4 DAYS 12 tablet 1    finasteride (PROSCAR) 5 MG tablet Take 1 tablet (5 mg) by mouth daily 90 tablet 3    hydrochlorothiazide (HYDRODIURIL) 25 MG tablet Take 1 tablet (25 mg) by mouth daily 90 tablet 3    multivitamin w/minerals (THERA-VIT-M) tablet Take 1 tablet by mouth daily.      rosuvastatin (CRESTOR) 10 MG tablet Take 1 tablet (10 mg) by mouth daily 90 tablet 3    spironolactone (ALDACTONE) 25 MG tablet Take 1 tablet (25 mg) by mouth daily 90 tablet 3       PAST SURGICAL HISTORY:  Past Surgical History:   Procedure Laterality Date    NO HISTORY OF SURGERY         ALLERGIES:     Allergies   Allergen Reactions    Prednisone Swelling     Al  over severe body swelling       FAMILY HISTORY:  Family History   Problem Relation Age of Onset    Diabetes Father     Heart Disease Brother          SOCIAL HISTORY:  Social History     Tobacco Use    Smoking status: Never    Smokeless tobacco: Never   Vaping Use    Vaping status: Never Used   Substance Use Topics    Alcohol use: Not Currently     Comment: occasionally    Drug use: No       ROS:   Constitutional: No fever, chills, or sweats. Weight stable.   Cardiovascular: As per HPI.       Exam:  /78 (BP Location: Right arm, Patient Position: Sitting, Cuff Size: Adult Regular)   Pulse 69   Wt 90.8 kg (200 lb 3.2 oz)   SpO2 94%   BMI 27.92 kg/m    GENERAL APPEARANCE: alert and no distress  HEENT: no icterus, no central cyanosis  CARDIOVASCULAR: regular rhythm, normal S1, S2, no S3 or S4 and no murmur, click or rub, precordium quiet with normal PMI.  EXTREMITIES: Trace pretibial edema on both sides.  NEURO: alert, normal speech,and affect  SKIN: no ecchymoses, no rashes     I have reviewed the labs and personally reviewed the imaging below and made my comment in the assessment and plan.    Labs:  CBC RESULTS:   Lab Results   Component Value Date    WBC 4.7 09/17/2015    RBC 4.75 09/17/2015    HGB 14.7 09/17/2015    HCT 42.8 09/17/2015    MCV 90 09/17/2015    MCH 30.9 09/17/2015    MCHC 34.3 09/17/2015    RDW 13.2 09/17/2015     (L) 09/17/2015       BMP RESULTS:  Lab Results   Component Value Date     06/28/2023     03/01/2021    POTASSIUM 4.2 06/28/2023    POTASSIUM 4.0 08/19/2022    POTASSIUM 3.7 03/01/2021    CHLORIDE 104 06/28/2023    CHLORIDE 105 08/19/2022    CHLORIDE 105 03/01/2021    CO2 25 06/28/2023    CO2 28 08/19/2022    CO2 31 03/01/2021    ANIONGAP 12 06/28/2023    ANIONGAP 6 08/19/2022    ANIONGAP 3 03/01/2021     (H) 06/28/2023     (H) 08/19/2022     (H) 03/01/2021    BUN 22.5 06/28/2023    BUN 18 08/19/2022    BUN 17 03/01/2021    CR 1.28 (H)  06/28/2023    CR 1.10 03/01/2021    GFRESTIMATED 59 (L) 06/28/2023    GFRESTIMATED 68 03/01/2021    GFRESTBLACK 78 03/01/2021    ALIZA 10.1 06/28/2023    ALIZA 9.6 03/01/2021 6/29/22: 3 day zio:   SR with 60 runs SVT- longest 13 seconds-rte 101 bpm  PAC- 20% burden  Frequent AT      Exercise stress echocardiogram 9/8/2022  Near maximal stress test, achieved 82% of the age predicted maximal heart  rate. Test stopped due to hypertension.     No angina symptoms with exercise.  No ECG evidence of ischemia.  Normal segmental and global LV function with EF of approximately 55-60% at  rest; with exercise left ventricular cavity size decreases and LVEF increases  to 60-65%.  No stress induced regional wall motion abnormalities.  Average functional capacity for age.     No significant valvular dysfunction noted on screening 2D and Doppler  Examination.    CT calcium screening 8/17/2022  Severe coronary artery calcification total score 1425 placing the patient in the 89th percentile.    5/6/22: Echo  Interpretation Summary    Global and regional left ventricular function is normal with an EF of 60-65%.  Left ventricular diastolic function is normal.  Mild concentric wall thickening consistent with left ventricular hypertrophy  is present.  Global right ventricular function is normal.  No significant valvular abnormalities.  No pericardial effusion is present.  Dilation of the inferior vena cava is present with abnormal respiratory  variation in diameter suggests a high RA pressure estimated at 15 mmHg or  greater.    There is no prior study for direct comparison.      EKG 6/29/2022 personally reviewed shows sinus rhythm with occasional PVC.    Assessment and Plan:     # Asymptomatic frequent PACs and nonsustained SVT (19.7% burden as assessed by Zio patch 6/29/2022)  -Frequent PACs (today's EKG shows bigeminal PACs) still present on physical exam and EKG in clinic today.  Echocardiogram in 2022 showed with mild LVH and  normal LVEF, no valve disease  -Patient asymptomatic.  -Recommended self monitoring with Apple Watch for incidental A-fib as he is at high risk given high PACs    #Hypertension  #Family history of premature CAD  #Severe coronary calcifications on CT calcium screening 8/17/2022 (total calcium score 1425)  -Patient is asymptomatic.  Blood pressure well-controlled.  Continue spironolactone 25, hydrochlorothiazide 25, amlodipine 7.5 mg.  Continue Crestor 10 mg and aspirin 81 mg  -Basic labs ordered today including CMP, CBC and hemoglobin A1c.  Patient also requestED PSA test.    Return to clinic 2 year.  No medication changes today.    Total time spent today for this visit is 47 minutes including precharting, face-to-face clinic visit, review of labs/imaging and medical documentation.      Joya GORDON MD  [facility] Division of Cardiology  Pager [phone]

## 2024-06-25 LAB
ALBUMIN SERPL BCG-MCNC: 4.8 G/DL (ref 3.5–5.2)
ALP SERPL-CCNC: 104 U/L (ref 40–150)
ALT SERPL W P-5'-P-CCNC: 24 U/L (ref 0–70)
ANION GAP SERPL CALCULATED.3IONS-SCNC: 12 MMOL/L (ref 7–15)
AST SERPL W P-5'-P-CCNC: 28 U/L (ref 0–45)
ATRIAL RATE - MUSE: 65 BPM
BILIRUB SERPL-MCNC: 0.8 MG/DL
BUN SERPL-MCNC: 23 MG/DL (ref 8–23)
CALCIUM SERPL-MCNC: 10.2 MG/DL (ref 8.8–10.2)
CHLORIDE SERPL-SCNC: 102 MMOL/L (ref 98–107)
CREAT SERPL-MCNC: 1.42 MG/DL (ref 0.67–1.17)
DEPRECATED HCO3 PLAS-SCNC: 25 MMOL/L (ref 22–29)
DIASTOLIC BLOOD PRESSURE - MUSE: NORMAL MMHG
EGFRCR SERPLBLD CKD-EPI 2021: 52 ML/MIN/1.73M2
GLUCOSE SERPL-MCNC: 100 MG/DL (ref 70–99)
INTERPRETATION ECG - MUSE: NORMAL
P AXIS - MUSE: NORMAL DEGREES
POTASSIUM SERPL-SCNC: 4.1 MMOL/L (ref 3.4–5.3)
PR INTERVAL - MUSE: 152 MS
PROT SERPL-MCNC: 7.4 G/DL (ref 6.4–8.3)
PSA SERPL DL<=0.01 NG/ML-MCNC: 1.7 NG/ML (ref 0–6.5)
QRS DURATION - MUSE: 86 MS
QT - MUSE: 460 MS
QTC - MUSE: 478 MS
R AXIS - MUSE: -55 DEGREES
SODIUM SERPL-SCNC: 139 MMOL/L (ref 135–145)
SYSTOLIC BLOOD PRESSURE - MUSE: NORMAL MMHG
T AXIS - MUSE: -22 DEGREES
VENTRICULAR RATE- MUSE: 65 BPM

## 2024-07-02 ENCOUNTER — MYC MEDICAL ADVICE (OUTPATIENT)
Dept: CARDIOLOGY | Facility: CLINIC | Age: 74
End: 2024-07-02
Payer: COMMERCIAL

## 2024-07-02 NOTE — TELEPHONE ENCOUNTER
From: Joya Gordon MD   Sent: 6/25/2024   8:35 PM CDT   To: [facility]     Derrick,     I would recommend to continue current treatment.  No concerning findings including  PSA.   You need this kidney test every year.   DR GORDON   Cardiology     [facility] message sent to patient.    Sylvia Verduzco, RN, BSN  Cardiology RN Care Coordinator   [place]   Phone: [phone]  Fax: [phone] ([place]) [phone] ([place])

## 2024-07-03 NOTE — TELEPHONE ENCOUNTER
Attempted to call patient. Left message for patient.    Sylvia Verduzco, RN, BSN  Cardiology RN Care Coordinator   [place]   Phone: [phone]  Fax: [phone] ([place]) [phone] ([place])

## 2024-07-05 NOTE — TELEPHONE ENCOUNTER
Patient saw [employer] message. No response at this time.    Sylvia Verduzco, RN, BSN  Cardiology RN Care Coordinator   [place]   Phone: [phone]  Fax: [phone] ([place]) [phone] ([place])

## 2024-07-06 DIAGNOSIS — Z82.49 FAMILY HISTORY OF CORONARY ARTERY DISEASE: ICD-10-CM

## 2024-07-06 DIAGNOSIS — R93.1 ELEVATED CORONARY ARTERY CALCIUM SCORE: ICD-10-CM

## 2024-07-06 DIAGNOSIS — I10 HYPERTENSION GOAL BP (BLOOD PRESSURE) < 140/90: ICD-10-CM

## 2024-07-06 DIAGNOSIS — N40.0 BENIGN NON-NODULAR PROSTATIC HYPERPLASIA WITHOUT LOWER URINARY TRACT SYMPTOMS: ICD-10-CM

## 2024-07-07 RX ORDER — FINASTERIDE 5 MG/1
1 TABLET, FILM COATED ORAL DAILY
Qty: 90 TABLET | Refills: 3 | Status: SHIPPED | OUTPATIENT
Start: 2024-07-07 | End: 2024-07-08

## 2024-07-07 RX ORDER — SPIRONOLACTONE 25 MG/1
25 TABLET ORAL DAILY
Qty: 90 TABLET | Refills: 3 | Status: SHIPPED | OUTPATIENT
Start: 2024-07-07

## 2024-07-07 RX ORDER — AMLODIPINE BESYLATE 2.5 MG/1
2.5 TABLET ORAL DAILY
Qty: 90 TABLET | Refills: 3 | Status: SHIPPED | OUTPATIENT
Start: 2024-07-07 | End: 2024-07-08

## 2024-07-07 RX ORDER — HYDROCHLOROTHIAZIDE 25 MG/1
25 TABLET ORAL DAILY
Qty: 90 TABLET | Refills: 3 | Status: SHIPPED | OUTPATIENT
Start: 2024-07-07

## 2024-07-07 RX ORDER — ROSUVASTATIN CALCIUM 10 MG/1
10 TABLET, COATED ORAL DAILY
Qty: 90 TABLET | Refills: 3 | Status: SHIPPED | OUTPATIENT
Start: 2024-07-07 | End: 2024-07-08

## 2024-07-08 RX ORDER — FINASTERIDE 5 MG/1
1 TABLET, FILM COATED ORAL DAILY
Qty: 90 TABLET | Refills: 3 | Status: SHIPPED | OUTPATIENT
Start: 2024-07-08

## 2024-07-08 RX ORDER — ROSUVASTATIN CALCIUM 10 MG/1
10 TABLET, COATED ORAL DAILY
Qty: 90 TABLET | Refills: 3 | Status: SHIPPED | OUTPATIENT
Start: 2024-07-08

## 2024-07-08 RX ORDER — AMLODIPINE BESYLATE 2.5 MG/1
2.5 TABLET ORAL DAILY
Qty: 90 TABLET | Refills: 3 | Status: SHIPPED | OUTPATIENT
Start: 2024-07-08

## 2025-04-07 ENCOUNTER — TELEPHONE (OUTPATIENT)
Dept: FAMILY MEDICINE | Facility: CLINIC | Age: 75
End: 2025-04-07
Payer: COMMERCIAL

## 2025-04-07 NOTE — TELEPHONE ENCOUNTER
Patient Quality Outreach    Patient is due for the following:   Physical Annual Wellness Visit    Action(s) Taken:   Schedule a Annual Wellness Visit    Type of outreach:    Sent [employer] message.    Questions for provider review:    None         Genia Dooley MA  Chart routed to None.        
no

## 2025-04-23 ENCOUNTER — OFFICE VISIT (OUTPATIENT)
Dept: FAMILY MEDICINE | Facility: CLINIC | Age: 75
End: 2025-04-23
Payer: COMMERCIAL

## 2025-04-23 VITALS
SYSTOLIC BLOOD PRESSURE: 135 MMHG | WEIGHT: 195 LBS | BODY MASS INDEX: 28.88 KG/M2 | TEMPERATURE: 97 F | HEIGHT: 69 IN | DIASTOLIC BLOOD PRESSURE: 70 MMHG | OXYGEN SATURATION: 98 % | RESPIRATION RATE: 18 BRPM | HEART RATE: 67 BPM

## 2025-04-23 DIAGNOSIS — I10 HYPERTENSION GOAL BP (BLOOD PRESSURE) < 140/90: ICD-10-CM

## 2025-04-23 DIAGNOSIS — R35.0 BENIGN PROSTATIC HYPERPLASIA WITH URINARY FREQUENCY: Primary | ICD-10-CM

## 2025-04-23 DIAGNOSIS — N40.1 BENIGN PROSTATIC HYPERPLASIA WITH URINARY FREQUENCY: Primary | ICD-10-CM

## 2025-04-23 DIAGNOSIS — I49.9 IRREGULAR HEART BEAT: ICD-10-CM

## 2025-04-23 DIAGNOSIS — Z00.00 ROUTINE GENERAL MEDICAL EXAMINATION AT A HEALTH CARE FACILITY: ICD-10-CM

## 2025-04-23 DIAGNOSIS — R93.1 ELEVATED CORONARY ARTERY CALCIUM SCORE: ICD-10-CM

## 2025-04-23 DIAGNOSIS — Z82.49 FAMILY HISTORY OF CORONARY ARTERY DISEASE: ICD-10-CM

## 2025-04-23 DIAGNOSIS — N40.0 BENIGN NON-NODULAR PROSTATIC HYPERPLASIA WITHOUT LOWER URINARY TRACT SYMPTOMS: ICD-10-CM

## 2025-04-23 DIAGNOSIS — N18.31 CHRONIC KIDNEY DISEASE, STAGE 3A (H): ICD-10-CM

## 2025-04-23 RX ORDER — TADALAFIL 5 MG/1
5 TABLET ORAL EVERY 24 HOURS
Qty: 30 TABLET | Refills: 0 | Status: SHIPPED | OUTPATIENT
Start: 2025-04-23

## 2025-04-23 RX ORDER — TADALAFIL 5 MG/1
5 TABLET ORAL EVERY 24 HOURS
Qty: 30 TABLET | Refills: 0 | Status: SHIPPED | OUTPATIENT
Start: 2025-04-23 | End: 2025-04-23

## 2025-04-23 RX ORDER — SPIRONOLACTONE 25 MG/1
25 TABLET ORAL DAILY
Qty: 90 TABLET | Refills: 3 | Status: SHIPPED | OUTPATIENT
Start: 2025-04-23

## 2025-04-23 RX ORDER — ROSUVASTATIN CALCIUM 10 MG/1
10 TABLET, COATED ORAL DAILY
Qty: 90 TABLET | Refills: 3 | Status: SHIPPED | OUTPATIENT
Start: 2025-04-23

## 2025-04-23 RX ORDER — FINASTERIDE 5 MG/1
1 TABLET, FILM COATED ORAL DAILY
Qty: 90 TABLET | Refills: 3 | Status: SHIPPED | OUTPATIENT
Start: 2025-04-23 | End: 2025-04-23

## 2025-04-23 RX ORDER — HYDROCHLOROTHIAZIDE 25 MG/1
25 TABLET ORAL DAILY
Qty: 90 TABLET | Refills: 3 | Status: SHIPPED | OUTPATIENT
Start: 2025-04-23 | End: 2025-04-23

## 2025-04-23 RX ORDER — AMLODIPINE BESYLATE 10 MG/1
10 TABLET ORAL DAILY
Qty: 90 TABLET | Refills: 2 | Status: SHIPPED | OUTPATIENT
Start: 2025-04-23

## 2025-04-23 SDOH — HEALTH STABILITY: PHYSICAL HEALTH: ON AVERAGE, HOW MANY DAYS PER WEEK DO YOU ENGAGE IN MODERATE TO STRENUOUS EXERCISE (LIKE A BRISK WALK)?: 4 DAYS

## 2025-04-23 ASSESSMENT — SOCIAL DETERMINANTS OF HEALTH (SDOH): HOW OFTEN DO YOU GET TOGETHER WITH FRIENDS OR RELATIVES?: ONCE A WEEK

## 2025-04-23 ASSESSMENT — PAIN SCALES - GENERAL: PAINLEVEL_OUTOF10: NO PAIN (0)

## 2025-04-23 NOTE — PROGRESS NOTES
Preventive Care Visit  [facility]  Sancho Rushing MD, Family Medicine  Apr 23, 2025        ICD-10-CM    1. Benign prostatic hyperplasia with urinary frequency  N40.1 DISCONTINUED: tadalafil (CIALIS) 5 MG tablet    R35.0       2. Hypertension goal BP (blood pressure) < 140/90  I10 Comprehensive metabolic panel (BMP + Alb, Alk Phos, ALT, AST, Total. Bili, TP)     Lipid panel reflex to direct LDL Fasting     rosuvastatin (CRESTOR) 10 MG tablet     spironolactone (ALDACTONE) 25 MG tablet     amLODIPine (NORVASC) 10 MG tablet     DISCONTINUED: hydrochlorothiazide (HYDRODIURIL) 25 MG tablet      3. Family history of coronary artery disease  Z82.49 rosuvastatin (CRESTOR) 10 MG tablet     spironolactone (ALDACTONE) 25 MG tablet      4. Elevated coronary artery calcium score  R93.1 Comprehensive metabolic panel (BMP + Alb, Alk Phos, ALT, AST, Total. Bili, TP)     Lipid panel reflex to direct LDL Fasting     rosuvastatin (CRESTOR) 10 MG tablet     spironolactone (ALDACTONE) 25 MG tablet      5. Benign non-nodular prostatic hyperplasia without lower urinary tract symptoms  N40.0 tadalafil (CIALIS) 5 MG tablet     DISCONTINUED: finasteride (PROSCAR) 5 MG tablet       PLAN:    Gus Meza is a 74 year old, presenting for the following:  Physical        4/23/2025     1:15 PM   Additional Questions   Roomed by Aimee SHAH CMA   Accompanied by DION FRENCH  SUBJECTIVE:  74 year oldyear old male enters with  hypertension.  Pt. Has been compliant with medications and medications were reviewed.  No side effects. No chest pain or sob. Low sodium diet.    Current Outpatient Medications:     amLODIPine (NORVASC) 10 MG tablet, Take 1 tablet (10 mg) by mouth daily., Disp: 90 tablet, Rfl: 2    aspirin (ASA) 81 MG EC tablet, Take 1 tablet (81 mg) by mouth daily, Disp: 90 tablet, Rfl: 3    multivitamin w/minerals (THERA-VIT-M) tablet, Take 1 tablet by mouth daily., Disp: , Rfl:     rosuvastatin (CRESTOR)  10 MG tablet, Take 1 tablet (10 mg) by mouth daily., Disp: 90 tablet, Rfl: 3    spironolactone (ALDACTONE) 25 MG tablet, Take 1 tablet (25 mg) by mouth daily., Disp: 90 tablet, Rfl: 3    tadalafil (CIALIS) 5 MG tablet, Take 1 tablet (5 mg) by mouth every 24 hours., Disp: 30 tablet, Rfl: 0  Past Medical History:   Diagnosis Date    HTN (hypertension)     Hypercholesteremia     Leucopenia      Office Visit on 06/24/2024   Component Date Value Ref Range Status    Ventricular Rate 06/24/2024 65  BPM Corrected    Atrial Rate 06/24/2024 65  BPM Corrected    OR Interval 06/24/2024 152  ms Corrected    QRS Duration 06/24/2024 86  ms Corrected    QT 06/24/2024 460  ms Corrected    QTc 06/24/2024 478  ms Corrected    R AXIS 06/24/2024 -55  degrees Corrected    T Axis 06/24/2024 -22  degrees Corrected    Interpretation ECG 06/24/2024    Corrected                    Value:Sinus rhythm with PACs  Left axis deviation  Inferior infarct , age undetermined  Cannot rule out Anterior infarct , age undetermined  Abnormal ECG  No previous ECGs available  Reconfirmed by MD JANE, SHARRI ([id number]) on 6/25/2024 4:30:50 PM      WBC Count 06/24/2024 6.7  4.0 - 11.0 10e3/uL Final    RBC Count 06/24/2024 4.86  4.40 - 5.90 10e6/uL Final    Hemoglobin 06/24/2024 15.0  13.3 - 17.7 g/dL Final    Hematocrit 06/24/2024 44.0  40.0 - 53.0 % Final    MCV 06/24/2024 91  78 - 100 fL Final    MCH 06/24/2024 30.9  26.5 - 33.0 pg Final    MCHC 06/24/2024 34.1  31.5 - 36.5 g/dL Final    RDW 06/24/2024 12.4  10.0 - 15.0 % Final    Platelet Count 06/24/2024 130 (L)  150 - 450 10e3/uL Final    Sodium 06/24/2024 139  135 - 145 mmol/L Final    Reference intervals for this test were updated on 09/26/2023 to more accurately reflect our healthy population. There may be differences in the flagging of prior results with similar values performed with this method. Interpretation of those prior results can be made in the context of the updated reference intervals.      Potassium 06/24/2024 4.1  3.4 - 5.3 mmol/L Final    Carbon Dioxide (CO2) 06/24/2024 25  22 - 29 mmol/L Final    Anion Gap 06/24/2024 12  7 - 15 mmol/L Final    Urea Nitrogen 06/24/2024 23.0  8.0 - 23.0 mg/dL Final    Creatinine 06/24/2024 1.42 (H)  0.67 - 1.17 mg/dL Final    GFR Estimate 06/24/2024 52 (L)  >60 mL/min/1.73m2 Final    eGFR calculated using 2021 CKD-EPI equation.    Calcium 06/24/2024 10.2  8.8 - 10.2 mg/dL Final    Chloride 06/24/2024 102  98 - 107 mmol/L Final    Glucose 06/24/2024 100 (H)  70 - 99 mg/dL Final    Alkaline Phosphatase 06/24/2024 104  40 - 150 U/L Final    AST 06/24/2024 28  0 - 45 U/L Final    Reference intervals for this test were updated on 6/12/2023 to more accurately reflect our healthy population. There may be differences in the flagging of prior results with similar values performed with this method. Interpretation of those prior results can be made in the context of the updated reference intervals.    ALT 06/24/2024 24  0 - 70 U/L Final    Reference intervals for this test were updated on 6/12/2023 to more accurately reflect our healthy population. There may be differences in the flagging of prior results with similar values performed with this method. Interpretation of those prior results can be made in the context of the updated reference intervals.      Protein Total 06/24/2024 7.4  6.4 - 8.3 g/dL Final    Albumin 06/24/2024 4.8  3.5 - 5.2 g/dL Final    Bilirubin Total 06/24/2024 0.8  <=1.2 mg/dL Final    Hemoglobin A1C 06/24/2024 5.5  0.0 - 5.6 % Final    Normal <5.7%   Prediabetes 5.7-6.4%    Diabetes 6.5% or higher     Note: Adopted from ADA consensus guidelines.    Prostate Specific Antigen Screen 06/24/2024 1.70  0.00 - 6.50 ng/mL Final      Reviewed health maintenance  Patient Active Problem List   Diagnosis    HYPERLIPIDEMIA LDL GOAL <130    Hypertension goal BP (blood pressure) < 140/90    Benign non-nodular prostatic hyperplasia without lower urinary tract symptoms  "   Impacted cerumen of left ear    Supraventricular tachycardia         OBJECTIVE:  no apparent distress  BP (!) 151/85   Pulse 67   Temp 97  F (36.1  C) (Tympanic)   Resp 18   Ht 1.746 m (5' 8.74\")   Wt 88.5 kg (195 lb)   SpO2 98%   BMI 29.01 kg/m       Head: Normocephalic. No masses, lesions, tenderness or abnormalities.  Neck::Neck supple. No adenopathy. Thyroid symmetric, normal size.    Cardiovascular: negative. No lifts, heaves, or thrills. RRR. No murmurs, clicks gallops or rubs  Respiratory. Good diaphragmatic excursion. Lungs clear  Gastrointestinal:Abdomen soft, non-tender.  No masses, organomegaly    Office Visit on 06/24/2024   Component Date Value Ref Range Status    Ventricular Rate 06/24/2024 65  BPM Corrected    Atrial Rate 06/24/2024 65  BPM Corrected    AR Interval 06/24/2024 152  ms Corrected    QRS Duration 06/24/2024 86  ms Corrected    QT 06/24/2024 460  ms Corrected    QTc 06/24/2024 478  ms Corrected    R AXIS 06/24/2024 -55  degrees Corrected    T Axis 06/24/2024 -22  degrees Corrected    Interpretation ECG 06/24/2024    Corrected                    Value:Sinus rhythm with PACs  Left axis deviation  Inferior infarct , age undetermined  Cannot rule out Anterior infarct , age undetermined  Abnormal ECG  No previous ECGs available  Reconfirmed by MD JANE, SHARRI ([id number]) on 6/25/2024 4:30:50 PM      WBC Count 06/24/2024 6.7  4.0 - 11.0 10e3/uL Final    RBC Count 06/24/2024 4.86  4.40 - 5.90 10e6/uL Final    Hemoglobin 06/24/2024 15.0  13.3 - 17.7 g/dL Final    Hematocrit 06/24/2024 44.0  40.0 - 53.0 % Final    MCV 06/24/2024 91  78 - 100 fL Final    MCH 06/24/2024 30.9  26.5 - 33.0 pg Final    MCHC 06/24/2024 34.1  31.5 - 36.5 g/dL Final    RDW 06/24/2024 12.4  10.0 - 15.0 % Final    Platelet Count 06/24/2024 130 (L)  150 - 450 10e3/uL Final    Sodium 06/24/2024 139  135 - 145 mmol/L Final    Reference intervals for this test were updated on 09/26/2023 to more accurately reflect our " healthy population. There may be differences in the flagging of prior results with similar values performed with this method. Interpretation of those prior results can be made in the context of the updated reference intervals.     Potassium 06/24/2024 4.1  3.4 - 5.3 mmol/L Final    Carbon Dioxide (CO2) 06/24/2024 25  22 - 29 mmol/L Final    Anion Gap 06/24/2024 12  7 - 15 mmol/L Final    Urea Nitrogen 06/24/2024 23.0  8.0 - 23.0 mg/dL Final    Creatinine 06/24/2024 1.42 (H)  0.67 - 1.17 mg/dL Final    GFR Estimate 06/24/2024 52 (L)  >60 mL/min/1.73m2 Final    eGFR calculated using 2021 CKD-EPI equation.    Calcium 06/24/2024 10.2  8.8 - 10.2 mg/dL Final    Chloride 06/24/2024 102  98 - 107 mmol/L Final    Glucose 06/24/2024 100 (H)  70 - 99 mg/dL Final    Alkaline Phosphatase 06/24/2024 104  40 - 150 U/L Final    AST 06/24/2024 28  0 - 45 U/L Final    Reference intervals for this test were updated on 6/12/2023 to more accurately reflect our healthy population. There may be differences in the flagging of prior results with similar values performed with this method. Interpretation of those prior results can be made in the context of the updated reference intervals.    ALT 06/24/2024 24  0 - 70 U/L Final    Reference intervals for this test were updated on 6/12/2023 to more accurately reflect our healthy population. There may be differences in the flagging of prior results with similar values performed with this method. Interpretation of those prior results can be made in the context of the updated reference intervals.      Protein Total 06/24/2024 7.4  6.4 - 8.3 g/dL Final    Albumin 06/24/2024 4.8  3.5 - 5.2 g/dL Final    Bilirubin Total 06/24/2024 0.8  <=1.2 mg/dL Final    Hemoglobin A1C 06/24/2024 5.5  0.0 - 5.6 % Final    Normal <5.7%   Prediabetes 5.7-6.4%    Diabetes 6.5% or higher     Note: Adopted from ADA consensus guidelines.    Prostate Specific Antigen Screen 06/24/2024 1.70  0.00 - 6.50 ng/mL Final          ICD-10-CM    1. Benign prostatic hyperplasia with urinary frequency  N40.1 DISCONTINUED: tadalafil (CIALIS) 5 MG tablet    R35.0       2. Hypertension goal BP (blood pressure) < 140/90  I10 Comprehensive metabolic panel (BMP + Alb, Alk Phos, ALT, AST, Total. Bili, TP)     Lipid panel reflex to direct LDL Fasting     rosuvastatin (CRESTOR) 10 MG tablet     spironolactone (ALDACTONE) 25 MG tablet     amLODIPine (NORVASC) 10 MG tablet     DISCONTINUED: hydrochlorothiazide (HYDRODIURIL) 25 MG tablet      3. Family history of coronary artery disease  Z82.49 rosuvastatin (CRESTOR) 10 MG tablet     spironolactone (ALDACTONE) 25 MG tablet      4. Elevated coronary artery calcium score  R93.1 Comprehensive metabolic panel (BMP + Alb, Alk Phos, ALT, AST, Total. Bili, TP)     Lipid panel reflex to direct LDL Fasting     rosuvastatin (CRESTOR) 10 MG tablet     spironolactone (ALDACTONE) 25 MG tablet      5. Benign non-nodular prostatic hyperplasia without lower urinary tract symptoms  N40.0 tadalafil (CIALIS) 5 MG tablet     DISCONTINUED: finasteride (PROSCAR) 5 MG tablet       PLAN: stop hydrochlorothiazide and start amlodipine 10      Advance Care Planning    Patient states has Health Care Directive and will send to [facility].        4/23/2025   General Health   How would you rate your overall physical health? Good   Feel stress (tense, anxious, or unable to sleep) Not at all         4/23/2025   Nutrition   Diet: Regular (no restrictions)         4/23/2025   Exercise   Days per week of moderate/strenous exercise 4 days         4/23/2025   Social Factors   Frequency of gathering with friends or relatives Once a week   Worry food won't last until get money to buy more No   Food not last or not have enough money for food? No   Do you have housing? (Housing is defined as stable permanent housing and does not include staying outside in a car, in a tent, in an abandoned building, in an overnight shelter, or couch-surfing.)  Yes   Are you worried about losing your housing? No   Lack of transportation? No   Unable to get utilities (heat,electricity)? No         4/23/2025   Fall Risk   Fallen 2 or more times in the past year? No   Trouble with walking or balance? No          4/23/2025   Activities of Daily Living- Home Safety   Needs help with the following daily activites None of the above   Safety concerns in the home None of the above         4/23/2025   Dental   Dentist two times every year? Yes         4/23/2025   Hearing Screening   Hearing concerns? None of the above         4/23/2025   Driving Risk Screening   Patient/family members have concerns about driving No         4/23/2025   General Alertness/Fatigue Screening   Have you been more tired than usual lately? No         4/23/2025   Urinary Incontinence Screening   Bothered by leaking urine in past 6 months No         Today's PHQ-2 Score:       4/23/2025     1:08 PM   PHQ-2 ( 1999 [employer])   Q1: Little interest or pleasure in doing things 0   Q2: Feeling down, depressed or hopeless 0   PHQ-2 Score 0    Q1: Little interest or pleasure in doing things Not at all   Q2: Feeling down, depressed or hopeless Not at all   PHQ-2 Score 0       Patient-reported           4/23/2025   Substance Use   Alcohol more than 3/day or more than 7/wk No   Do you have a current opioid prescription? No   How severe/bad is pain from 1 to 10? 0/10 (No Pain)   Do you use any other substances recreationally? (!) PRESCRIPTION DRUGS     Social History     Tobacco Use    Smoking status: Never    Smokeless tobacco: Never   Vaping Use    Vaping status: Never Used   Substance Use Topics    Alcohol use: Not Currently     Comment: occasionally    Drug use: No           4/23/2025   AAA Screening   Family history of Abdominal Aortic Aneurysm (AAA)? No   ASCVD Risk   The 10-year ASCVD risk score (Ta LOPEZ, et al., 2019) is: 30.2%    Values used to calculate the score:      Age: 74 years      Sex: Male       Is Non- : No      Diabetic: No      Tobacco smoker: No      Systolic Blood Pressure: 151 mmHg      Is BP treated: Yes      HDL Cholesterol: 57 mg/dL      Total Cholesterol: 153 mg/dL            Reviewed and updated as needed this visit by Provider                    Past Medical History:   Diagnosis Date    HTN (hypertension)     Hypercholesteremia     Leucopenia      Past Surgical History:   Procedure Laterality Date    NO HISTORY OF SURGERY       Current providers sharing in care for this patient include:  Patient Care Team:  Sancho Rushing MD as PCP - General  Sancho Rushing MD as Assigned PCP  Joya Woodson MD as MD (Cardiovascular Disease)  Joya Woodson MD as Assigned Heart and Vascular Provider    The following health maintenance items are reviewed in Epic and correct as of today:  Health Maintenance   Topic Date Due    ZOSTER IMMUNIZATION (1 of 2) Never done    RSV VACCINE (1 - Risk 60-74 years 1-dose series) Never done    MEDICARE ANNUAL WELLNESS VISIT  Never done    ANNUAL REVIEW OF HM ORDERS  02/15/2024    LIPID  06/28/2024    INFLUENZA VACCINE (1) 09/01/2024    COVID-19 Vaccine (4 - 2024-25 season) 09/01/2024    BMP  06/24/2025    CMP  06/24/2025    DTAP/TDAP/TD IMMUNIZATION (2 - Td or Tdap) 09/17/2025    FALL RISK ASSESSMENT  04/23/2026    COLORECTAL CANCER SCREENING  05/28/2027    DIABETES SCREENING  06/24/2027    ADVANCE CARE PLANNING  02/15/2028    HEPATITIS C SCREENING  Completed    PHQ-2 (once per calendar year)  Completed    Pneumococcal Vaccine: 50+ Years  Completed    HPV IMMUNIZATION  Aged Out    MENINGITIS IMMUNIZATION  Aged Out         Review of Systems  CONSTITUTIONAL: NEGATIVE for fever, chills, change in weight  INTEGUMENTARY/SKIN: NEGATIVE for worrisome rashes, moles or lesions  EYES: NEGATIVE for vision changes or irritation  ENT/MOUTH: NEGATIVE for ear, mouth and throat problems  RESP: NEGATIVE for significant cough or SOB  BREAST: NEGATIVE for  "masses, tenderness or discharge  CV: NEGATIVE for chest pain, palpitations or peripheral edema  GI: NEGATIVE for nausea, abdominal pain, heartburn, or change in bowel habits  : NEGATIVE for frequency, dysuria, or hematuria  MUSCULOSKELETAL: NEGATIVE for significant arthralgias or myalgia  NEURO: NEGATIVE for weakness, dizziness or paresthesias  ENDOCRINE: NEGATIVE for temperature intolerance, skin/hair changes  HEME: NEGATIVE for bleeding problems  PSYCHIATRIC: NEGATIVE for changes in mood or affect     Objective    Exam  BP (!) 151/85   Pulse 67   Temp 97  F (36.1  C) (Tympanic)   Resp 18   Ht 1.746 m (5' 8.74\")   Wt 88.5 kg (195 lb)   SpO2 98%   BMI 29.01 kg/m     Estimated body mass index is 29.01 kg/m  as calculated from the following:    Height as of this encounter: 1.746 m (5' 8.74\").    Weight as of this encounter: 88.5 kg (195 lb).    Physical Exam  GENERAL: alert and no distress  EYES: Eyes grossly normal to inspection, PERRL and conjunctivae and sclerae normal  HENT: ear canals and TM's normal, nose and mouth without ulcers or lesions  NECK: no adenopathy, no asymmetry, masses, or scars  RESP: lungs clear to auscultation - no rales, rhonchi or wheezes  CV: regular rate and rhythm, normal S1 S2, no S3 or S4, no murmur, click or rub, no peripheral edema  ABDOMEN: soft, nontender, no hepatosplenomegaly, no masses and bowel sounds normal  MS: no gross musculoskeletal defects noted, no edema  SKIN: no suspicious lesions or rashes  NEURO: Normal strength and tone, mentation intact and speech normal  PSYCH: mentation appears normal, affect normal/bright        4/23/2025   Mini Cog   Mini-Cog Not Completed (choose reason) Patient declines                  Signed Electronically by: Sancho Rushing MD    "

## 2025-05-15 ENCOUNTER — MYC MEDICAL ADVICE (OUTPATIENT)
Dept: FAMILY MEDICINE | Facility: CLINIC | Age: 75
End: 2025-05-15
Payer: COMMERCIAL

## 2025-05-15 ENCOUNTER — TELEPHONE (OUTPATIENT)
Dept: FAMILY MEDICINE | Facility: CLINIC | Age: 75
End: 2025-05-15
Payer: COMMERCIAL

## 2025-05-15 DIAGNOSIS — J01.90 ACUTE SINUSITIS WITH SYMPTOMS > 10 DAYS: Primary | ICD-10-CM

## 2025-05-15 DIAGNOSIS — J01.00 ACUTE MAXILLARY SINUSITIS: ICD-10-CM

## 2025-05-15 RX ORDER — AZITHROMYCIN 250 MG/1
TABLET, FILM COATED ORAL
Qty: 6 TABLET | Refills: 0 | Status: SHIPPED | OUTPATIENT
Start: 2025-05-15

## 2025-05-15 NOTE — TELEPHONE ENCOUNTER
New Medication Request    Contacts       Contact Date/Time Type Contact Phone/Fax    05/15/2025 08:02 AM CDT Phone (Incoming) So Yusuf (Emergency Contact) [phone] (M)            What medication are you requesting?: Azithromycin    Reason for medication request: Cough for 2 weeks    Have you taken this medication before?: Yes: for cough. Patient's wife stated that he gets the cough every time he travels, and you have given him antibiotics in the past. They would also like 1 extra refill.    Controlled Substance Agreement on file:   CSA -- Patient Level:    CSA: None found at the patient level.         Patient offered an appointment? No    Preferred Pharmacy:[facility]      Could we send this information to you in [place] or would you prefer to receive a phone call?:   Patient would prefer a phone call   Okay to leave a detailed message?: Yes at Other phone number:  [phone]    India Grajeda [facility]

## 2025-05-15 NOTE — TELEPHONE ENCOUNTER
Left message on voicemail that RX was sent to the pharmacy.India Grajeda [facility]

## 2025-05-20 DIAGNOSIS — J01.90 ACUTE SINUSITIS WITH SYMPTOMS > 10 DAYS: ICD-10-CM

## 2025-05-20 RX ORDER — AZITHROMYCIN 250 MG/1
TABLET, FILM COATED ORAL
Qty: 6 TABLET | Refills: 0 | Status: SHIPPED | OUTPATIENT
Start: 2025-05-20

## 2025-06-04 ENCOUNTER — TELEPHONE (OUTPATIENT)
Dept: CARDIOLOGY | Facility: CLINIC | Age: 75
End: 2025-06-04
Payer: COMMERCIAL

## 2025-06-04 DIAGNOSIS — R06.09 DOE (DYSPNEA ON EXERTION): Primary | ICD-10-CM

## 2025-06-04 NOTE — TELEPHONE ENCOUNTER
First of all patient needs to inform his primary care physician who made his medication changes.  Before we do some further testing for the heart.

## 2025-06-04 NOTE — TELEPHONE ENCOUNTER
Spoke to patient.  Patient reports that starting 1 month ago he has been feeling tired and has had a dry cough-he saw primary care and he made some med changes.  Today he was golfing walking while pushing a cart and felt weak and short of breath.  His apple watch shows a heart rate of 130 bpm.     He is now home and his BP is 130/89 HR in the 80s.    Denies chest pain.  Is c/o insomnia.    4/23/25 Primary care stopped hydrochlorothiazide and increased amlodipine to 10 mg daily.  Patient feels he is experiencing more shortness of breath with this medication change.  EKG was done during this visit and shows PAC's.    Patient and wife would like to be seen soon by Dr. Woodson, no availability.  Would like to see Dr. Pierce recommendations.  They are agreeable to complete testing such as echo or zio.  They are scheduled for labs per primary care tomorrow BMP and lipids. No concerning weight gain.    Dr. Woodson please review and advise.  Pt is now scheduled to see you 6/25 because there was a cancellation.    Helga Hector RN  Cardiology Care Coordinator  [facility]  [phone] option 1

## 2025-06-04 NOTE — TELEPHONE ENCOUNTER
Health Call Center    Phone Message    May a detailed message be left on voicemail: yes     Reason for Call: Symptoms or Concerns     If patient has red-flag symptoms, warm transfer to triage line    Current symptom or concern: Per So, spouse, patient is feeling tired for the last couple of months and it has gotten worse. He also has a dry cough for a month as well. When he exercise his heart beat is faster than normal. No other symptoms. Patient wants to see Dr. Woodson but no available and [facility] is out to September 2025. Please review and reach out to at [phone]. Thanks         Are there any new or worsening symptoms? Yes: He is feeling more tired and not feeling like himself.    Action Taken: Message routed to:  Other: Cardio    Travel Screening: Not Applicable     Date of Service:

## 2025-06-05 ENCOUNTER — LAB (OUTPATIENT)
Dept: LAB | Facility: CLINIC | Age: 75
End: 2025-06-05
Payer: COMMERCIAL

## 2025-06-05 DIAGNOSIS — R93.1 ELEVATED CORONARY ARTERY CALCIUM SCORE: ICD-10-CM

## 2025-06-05 DIAGNOSIS — I10 HYPERTENSION GOAL BP (BLOOD PRESSURE) < 140/90: ICD-10-CM

## 2025-06-05 LAB
ALBUMIN SERPL BCG-MCNC: 4.3 G/DL (ref 3.5–5.2)
ALP SERPL-CCNC: 182 U/L (ref 40–150)
ALT SERPL W P-5'-P-CCNC: 22 U/L (ref 0–70)
ANION GAP SERPL CALCULATED.3IONS-SCNC: 13 MMOL/L (ref 7–15)
AST SERPL W P-5'-P-CCNC: 37 U/L (ref 0–45)
BILIRUB SERPL-MCNC: 1.5 MG/DL
BUN SERPL-MCNC: 19.5 MG/DL (ref 8–23)
CALCIUM SERPL-MCNC: 10 MG/DL (ref 8.8–10.4)
CHLORIDE SERPL-SCNC: 105 MMOL/L (ref 98–107)
CHOLEST SERPL-MCNC: 132 MG/DL
CREAT SERPL-MCNC: 1.18 MG/DL (ref 0.67–1.17)
EGFRCR SERPLBLD CKD-EPI 2021: 65 ML/MIN/1.73M2
FASTING STATUS PATIENT QL REPORTED: YES
FASTING STATUS PATIENT QL REPORTED: YES
GLUCOSE SERPL-MCNC: 101 MG/DL (ref 70–99)
HCO3 SERPL-SCNC: 21 MMOL/L (ref 22–29)
HDLC SERPL-MCNC: 45 MG/DL
LDLC SERPL CALC-MCNC: 70 MG/DL
NONHDLC SERPL-MCNC: 87 MG/DL
POTASSIUM SERPL-SCNC: 4.4 MMOL/L (ref 3.4–5.3)
PROT SERPL-MCNC: 6.6 G/DL (ref 6.4–8.3)
SODIUM SERPL-SCNC: 139 MMOL/L (ref 135–145)
TRIGL SERPL-MCNC: 83 MG/DL

## 2025-06-05 NOTE — TELEPHONE ENCOUNTER
Spoke to caller.  Per Dr. Woodson, patient to follow-up with primary care with lab results/medication.  Patient verbalized understanding.      SE ordered and scheduled for 6/24 [place] location due to availability.  Dr. Woodson appt scheduled 6/25.    Helga Hector, RN  Cardiology Care Coordinator  [facility]  [phone] option 1

## 2025-06-05 NOTE — TELEPHONE ENCOUNTER
[facility] Call Center    Phone Message    May a detailed message be left on voicemail: yes     Reason for Call: Patient spouse called stated labs are done and would like to know if patient needs medication to be adjusted. Please call back to further discuss.     Action Taken: Other: Cardio     Travel Screening: Not Applicable     Date of Service:   Thank you!  [facility]

## 2025-06-11 ENCOUNTER — ANCILLARY PROCEDURE (OUTPATIENT)
Dept: GENERAL RADIOLOGY | Facility: CLINIC | Age: 75
End: 2025-06-11
Attending: FAMILY MEDICINE
Payer: COMMERCIAL

## 2025-06-11 ENCOUNTER — OFFICE VISIT (OUTPATIENT)
Dept: FAMILY MEDICINE | Facility: CLINIC | Age: 75
End: 2025-06-11
Payer: COMMERCIAL

## 2025-06-11 VITALS
HEIGHT: 69 IN | RESPIRATION RATE: 20 BRPM | WEIGHT: 196.8 LBS | DIASTOLIC BLOOD PRESSURE: 78 MMHG | SYSTOLIC BLOOD PRESSURE: 128 MMHG | BODY MASS INDEX: 29.15 KG/M2 | OXYGEN SATURATION: 99 % | TEMPERATURE: 97 F | HEART RATE: 94 BPM

## 2025-06-11 DIAGNOSIS — J90 PLEURAL EFFUSION: ICD-10-CM

## 2025-06-11 DIAGNOSIS — R05.8 COUGH PRODUCTIVE OF CLEAR SPUTUM: ICD-10-CM

## 2025-06-11 DIAGNOSIS — R74.8 ELEVATED LIVER ENZYMES: Primary | ICD-10-CM

## 2025-06-11 LAB
BASOPHILS # BLD AUTO: 0 10E3/UL (ref 0–0.2)
BASOPHILS NFR BLD AUTO: 0 %
EOSINOPHIL # BLD AUTO: 0.2 10E3/UL (ref 0–0.7)
EOSINOPHIL NFR BLD AUTO: 3 %
ERYTHROCYTE [DISTWIDTH] IN BLOOD BY AUTOMATED COUNT: 13.5 % (ref 10–15)
HCT VFR BLD AUTO: 43.1 % (ref 40–53)
HGB BLD-MCNC: 14.3 G/DL (ref 13.3–17.7)
IMM GRANULOCYTES # BLD: 0 10E3/UL
IMM GRANULOCYTES NFR BLD: 0 %
LYMPHOCYTES # BLD AUTO: 0.9 10E3/UL (ref 0.8–5.3)
LYMPHOCYTES NFR BLD AUTO: 15 %
MCH RBC QN AUTO: 31.4 PG (ref 26.5–33)
MCHC RBC AUTO-ENTMCNC: 33.2 G/DL (ref 31.5–36.5)
MCV RBC AUTO: 95 FL (ref 78–100)
MONOCYTES # BLD AUTO: 0.4 10E3/UL (ref 0–1.3)
MONOCYTES NFR BLD AUTO: 8 %
NEUTROPHILS # BLD AUTO: 4.1 10E3/UL (ref 1.6–8.3)
NEUTROPHILS NFR BLD AUTO: 73 %
NRBC # BLD AUTO: 0 10E3/UL
NRBC BLD AUTO-RTO: 0 /100
PLATELET # BLD AUTO: 83 10E3/UL (ref 150–450)
RBC # BLD AUTO: 4.56 10E6/UL (ref 4.4–5.9)
WBC # BLD AUTO: 5.7 10E3/UL (ref 4–11)

## 2025-06-11 PROCEDURE — 83690 ASSAY OF LIPASE: CPT | Performed by: FAMILY MEDICINE

## 2025-06-11 PROCEDURE — 71046 X-RAY EXAM CHEST 2 VIEWS: CPT | Mod: TC | Performed by: RADIOLOGY

## 2025-06-11 PROCEDURE — 3078F DIAST BP <80 MM HG: CPT | Performed by: FAMILY MEDICINE

## 2025-06-11 PROCEDURE — 80076 HEPATIC FUNCTION PANEL: CPT | Performed by: FAMILY MEDICINE

## 2025-06-11 PROCEDURE — 3074F SYST BP LT 130 MM HG: CPT | Performed by: FAMILY MEDICINE

## 2025-06-11 PROCEDURE — 85025 COMPLETE CBC W/AUTO DIFF WBC: CPT | Performed by: FAMILY MEDICINE

## 2025-06-11 PROCEDURE — 99214 OFFICE O/P EST MOD 30 MIN: CPT | Performed by: FAMILY MEDICINE

## 2025-06-11 PROCEDURE — 1126F AMNT PAIN NOTED NONE PRSNT: CPT | Performed by: FAMILY MEDICINE

## 2025-06-11 PROCEDURE — 36415 COLL VENOUS BLD VENIPUNCTURE: CPT | Performed by: FAMILY MEDICINE

## 2025-06-11 RX ORDER — FINASTERIDE 5 MG/1
1 TABLET, FILM COATED ORAL
COMMUNITY
Start: 2025-04-28

## 2025-06-11 ASSESSMENT — PAIN SCALES - GENERAL: PAINLEVEL_OUTOF10: NO PAIN (0)

## 2025-06-11 NOTE — PROGRESS NOTES
"    ICD-10-CM    1. Elevated liver enzymes  R74.8 US Abdomen Limited     Lipase     Hepatic panel (Albumin, ALT, AST, Bili, Alk Phos, TP)      2. Cough productive of clear sputum  R05.8        PLAN:3 weeks     Subjective   Derrick is a 74 year old, presenting for the following health issues:  Results        6/11/2025     1:38 PM   Additional Questions   Roomed by Aimee SHAH CMA   Accompanied by Wife     History of Present Illness       Reason for visit:  Blood test results   He is taking medications regularly.    SUBJECTIVE:  74 year old.The patient has a complaint of cough.  This started one month or longer ago.  quality productive Associated symptoms are insomnia.  Brought on by possibly nasal congestion .  Better with 2 doses of Zithromax  . ROS no fever or chills.   Worse with being supine    Reviewed health maintenance  Patient Active Problem List   Diagnosis    HYPERLIPIDEMIA LDL GOAL <130    Hypertension goal BP (blood pressure) < 140/90    Benign non-nodular prostatic hyperplasia without lower urinary tract symptoms    Impacted cerumen of left ear    Supraventricular tachycardia     Past Medical History:   Diagnosis Date    HTN (hypertension)     Hypercholesteremia     Leucopenia        OBJECTIVE:  no apparent distress  /78   Pulse 94   Temp 97  F (36.1  C) (Tympanic)   Resp 20   Ht 1.746 m (5' 8.74\")   Wt 89.3 kg (196 lb 12.8 oz)   SpO2 99%   BMI 29.28 kg/m      LUNGS:  CTA B/L, no wheezing or crackles.   Cardiovascular: negative, PMI normal. No lifts, heaves, or thrills. RRR. No murmurs, clicks gallops or rub   Gastrointestinal: Abdomen soft, non-tender. BS normal. No masses, organomegaly     Awaiting chest x-ray esults  Signed Electronically by: Sancho Rushing MD    "

## 2025-06-12 ENCOUNTER — TELEPHONE (OUTPATIENT)
Dept: FAMILY MEDICINE | Facility: CLINIC | Age: 75
End: 2025-06-12

## 2025-06-12 ENCOUNTER — ANCILLARY PROCEDURE (OUTPATIENT)
Dept: CT IMAGING | Facility: CLINIC | Age: 75
End: 2025-06-12
Attending: FAMILY MEDICINE
Payer: COMMERCIAL

## 2025-06-12 DIAGNOSIS — J90 PLEURAL EFFUSION: ICD-10-CM

## 2025-06-12 LAB
ALBUMIN SERPL BCG-MCNC: 4.3 G/DL (ref 3.5–5.2)
ALP SERPL-CCNC: 212 U/L (ref 40–150)
ALT SERPL W P-5'-P-CCNC: 35 U/L (ref 0–70)
AST SERPL W P-5'-P-CCNC: 65 U/L (ref 0–45)
BILIRUB SERPL-MCNC: 1.3 MG/DL
BILIRUBIN DIRECT (ROCHE PRO & PURE): 0.28 MG/DL (ref 0–0.45)
LIPASE SERPL-CCNC: 36 U/L (ref 13–60)
PROT SERPL-MCNC: 6.6 G/DL (ref 6.4–8.3)

## 2025-06-12 PROCEDURE — 71260 CT THORAX DX C+: CPT | Mod: GC | Performed by: RADIOLOGY

## 2025-06-12 RX ORDER — IOPAMIDOL 755 MG/ML
96 INJECTION, SOLUTION INTRAVASCULAR ONCE
Status: COMPLETED | OUTPATIENT
Start: 2025-06-12 | End: 2025-06-12

## 2025-06-12 RX ADMIN — IOPAMIDOL 96 ML: 755 INJECTION, SOLUTION INTRAVASCULAR at 14:19

## 2025-06-12 NOTE — TELEPHONE ENCOUNTER
Pt's spouse, So, returned call to clinic on pt's behalf.  See Consent to Communicate form giving authorization to discuss PHI with So, scanned on 9/17/15.    She states she is not with the pt at the time of call, but states he is at home and she can get in touch with him.  She states she has not listened to the voicemails left for pt yet.    This writer relayed Dr. Rushing's message, to So, regarding request for CT today to r/o PE. She states she will notify pt that STAT CT is needed and will await call back with location and time where CT can be done today.    RN contacted [facility] and Imaging Scheduling staff.   Imaging scheduling staff were able to get pt worked into the [facility] department today with STAT order placed by PCP.    So was added back to the call, with imaging scheduler, and accepted the appointment for pt today at [facility] at 2:40pm, with a 2:10pm arrival time. Address and check-in information was reviewed with So by . So agrees with this plan and will get in touch with pt again to inform him where to go. She states she was able to reach him and relayed the plan to pt while awaiting for this call back.    DARLINE Solis, RN

## 2025-06-12 NOTE — TELEPHONE ENCOUNTER
I saw patient yesterday for cough and report of chest x-ray is concerning.  I left him 2 messages on his phone. 1. That if his scan of his ultrasound of his liver is normal he should have act scan of his lungs. 2. The second message I have changed my my mind and feel he should have a ct scan of his chest now.  This has been ordered.  I am concerned he may have a lung clot.  I have tried both his Mobile and work phone. Pleaase assist him in getting the ct scan or have the  set this up after he has been informed.  Please, call me for any questions .  I will continue to try a contact him.

## 2025-06-12 NOTE — TELEPHONE ENCOUNTER
Attempted to reach pt by phone. There was no answer at mobile or work numbers. See provider's 6/12/2025 12:08 PM message below.    [facility] message also sent to pt. When pt returns call to clinic, will also offer to call [facility] location [phone] to see if they can accommodate a same-day CT to r/o PE.    Lucy Worthington, UBALDON, RN

## 2025-06-18 ENCOUNTER — TELEPHONE (OUTPATIENT)
Dept: PULMONOLOGY | Facility: CLINIC | Age: 75
End: 2025-06-18
Payer: COMMERCIAL

## 2025-06-18 DIAGNOSIS — J91.8 PLEURAL EFFUSION ASSOCIATED WITH PULMONARY INFECTION: Primary | ICD-10-CM

## 2025-06-18 DIAGNOSIS — J18.9 PLEURAL EFFUSION ASSOCIATED WITH PULMONARY INFECTION: Primary | ICD-10-CM

## 2025-06-18 NOTE — TELEPHONE ENCOUNTER
Left Voicemail (1st Attempt) and Sent Mychart (1st Attempt) for the patient to call back and schedule the following:    Appointment type: New Pulm  Provider: None  Return date: 3-5day per ref  Specialty phone number: [phone]  Additional appointment(s) needed: Full PFT  Additonal Notes: Per GP review: Dr. Alfonso reviewed in clinic OK to offer 7/1 which is currently on hold for him

## 2025-06-24 ENCOUNTER — TELEPHONE (OUTPATIENT)
Dept: CARDIOLOGY | Facility: CLINIC | Age: 75
End: 2025-06-24
Payer: COMMERCIAL

## 2025-06-24 ENCOUNTER — HOSPITAL ENCOUNTER (OUTPATIENT)
Dept: CARDIOLOGY | Facility: HOSPITAL | Age: 75
Discharge: HOME | End: 2025-06-24
Attending: INTERNAL MEDICINE
Payer: COMMERCIAL

## 2025-06-24 DIAGNOSIS — R06.09 DOE (DYSPNEA ON EXERTION): ICD-10-CM

## 2025-06-24 LAB
CV STRESS CURRENT BP HE: NORMAL
CV STRESS CURRENT HR HE: 100
CV STRESS CURRENT HR HE: 101
CV STRESS CURRENT HR HE: 101
CV STRESS CURRENT HR HE: 104
CV STRESS CURRENT HR HE: 104
CV STRESS CURRENT HR HE: 105
CV STRESS CURRENT HR HE: 106
CV STRESS CURRENT HR HE: 107
CV STRESS CURRENT HR HE: 107
CV STRESS CURRENT HR HE: 108
CV STRESS CURRENT HR HE: 108
CV STRESS CURRENT HR HE: 97
CV STRESS CURRENT HR HE: 98
CV STRESS CURRENT HR HE: 99
CV STRESS DEVIATION TIME HE: NORMAL
CV STRESS ECHO PERCENT HR HE: NORMAL
CV STRESS EXERCISE STAGE HE: NORMAL
CV STRESS EXERCISE STAGE REACHED HE: NORMAL
CV STRESS FINAL RESTING BP HE: NORMAL
CV STRESS FINAL RESTING HR HE: 97
CV STRESS MAX HR HE: 111
CV STRESS MAX TREADMILL GRADE HE: 10
CV STRESS MAX TREADMILL SPEED HE: 1.7
CV STRESS PEAK DIA BP HE: NORMAL
CV STRESS PEAK SYS BP HE: NORMAL
CV STRESS PHASE HE: NORMAL
CV STRESS PROTOCOL HE: NORMAL
CV STRESS REASON STOPPED HE: NORMAL
CV STRESS RESTING PT POSITION HE: NORMAL
CV STRESS RESTING PT POSITION HE: NORMAL
CV STRESS ST DEVIATION AMOUNT HE: NORMAL
CV STRESS ST DEVIATION ELEVATION HE: NORMAL
CV STRESS ST EVELATION AMOUNT HE: NORMAL
CV STRESS SYMPTOMS HE: NORMAL
CV STRESS TEST TYPE HE: NORMAL
CV STRESS TOTAL STAGE TIME MIN 1 HE: NORMAL
STRESS ECHO BASELINE DIASTOLIC HE: 107
STRESS ECHO BASELINE HR: 101
STRESS ECHO BASELINE SYSTOLIC BP: 179
STRESS ECHO LAST STRESS DIASTOLIC BP: 84
STRESS ECHO LAST STRESS HR: 104
STRESS ECHO LAST STRESS SYSTOLIC BP: 170
STRESS ECHO POST ESTIMATED WORKLOAD: 4.7
STRESS ECHO POST EXERCISE DUR MIN: 2
STRESS ECHO POST EXERCISE DUR SEC: 45
STRESS ECHO TARGET HR: 124

## 2025-06-24 PROCEDURE — 93016 CV STRESS TEST SUPVJ ONLY: CPT | Performed by: GENERAL ACUTE CARE HOSPITAL

## 2025-06-24 PROCEDURE — 93321 DOPPLER ECHO F-UP/LMTD STD: CPT | Mod: 26 | Performed by: INTERNAL MEDICINE

## 2025-06-24 PROCEDURE — 93350 STRESS TTE ONLY: CPT | Mod: 26 | Performed by: INTERNAL MEDICINE

## 2025-06-24 PROCEDURE — 93325 DOPPLER ECHO COLOR FLOW MAPG: CPT | Mod: TC

## 2025-06-24 PROCEDURE — 93018 CV STRESS TEST I&R ONLY: CPT | Performed by: INTERNAL MEDICINE

## 2025-06-24 PROCEDURE — 93325 DOPPLER ECHO COLOR FLOW MAPG: CPT | Mod: 26 | Performed by: INTERNAL MEDICINE

## 2025-06-24 NOTE — TELEPHONE ENCOUNTER
Health Call Center    Phone Message    May a detailed message be left on voicemail: yes     Reason for Call: Other: Erick has echo stress test this afternoon and Kaley is wondering which medication the patient can take today prior to this test. Please call her back to discuss.      Action Taken: Other: cardiology    Travel Screening: Not Applicable  Thank you!  [facility]       Date of Service:

## 2025-06-24 NOTE — TELEPHONE ENCOUNTER
Called and spoke with patient's wife. Wife states that they did receive a call earlier and medications was reviewed and discussed. Wife states that she did not have any questions at this time.     Sylvia Verduzco, RN, BSN  Cardiology RN Care Coordinator   [place]   Phone: [phone]  Fax: [phone] ([place]) [phone] ([place])

## 2025-06-25 ENCOUNTER — RESULTS FOLLOW-UP (OUTPATIENT)
Dept: CARDIOLOGY | Facility: CLINIC | Age: 75
End: 2025-06-25

## 2025-06-25 ENCOUNTER — OFFICE VISIT (OUTPATIENT)
Dept: CARDIOLOGY | Facility: CLINIC | Age: 75
End: 2025-06-25
Payer: COMMERCIAL

## 2025-06-25 ENCOUNTER — RESULTS FOLLOW-UP (OUTPATIENT)
Dept: CARDIOLOGY | Facility: OTHER | Age: 75
End: 2025-06-25

## 2025-06-25 VITALS
SYSTOLIC BLOOD PRESSURE: 147 MMHG | DIASTOLIC BLOOD PRESSURE: 86 MMHG | BODY MASS INDEX: 30.21 KG/M2 | HEART RATE: 91 BPM | OXYGEN SATURATION: 98 % | WEIGHT: 203 LBS

## 2025-06-25 DIAGNOSIS — I51.7 LEFT VENTRICULAR HYPERTROPHY: ICD-10-CM

## 2025-06-25 DIAGNOSIS — I10 PRIMARY HYPERTENSION: ICD-10-CM

## 2025-06-25 DIAGNOSIS — I50.30 HEART FAILURE WITH PRESERVED EJECTION FRACTION, NYHA CLASS II (H): Primary | ICD-10-CM

## 2025-06-25 DIAGNOSIS — R06.00 DYSPNEA, UNSPECIFIED TYPE: ICD-10-CM

## 2025-06-25 LAB
ANION GAP SERPL CALCULATED.3IONS-SCNC: 11 MMOL/L (ref 7–15)
BUN SERPL-MCNC: 17.1 MG/DL (ref 8–23)
CALCIUM SERPL-MCNC: 10.3 MG/DL (ref 8.8–10.4)
CHLORIDE SERPL-SCNC: 104 MMOL/L (ref 98–107)
CREAT SERPL-MCNC: 1.18 MG/DL (ref 0.67–1.17)
EGFRCR SERPLBLD CKD-EPI 2021: 65 ML/MIN/1.73M2
GLUCOSE SERPL-MCNC: 101 MG/DL (ref 70–99)
HCO3 SERPL-SCNC: 26 MMOL/L (ref 22–29)
NT-PROBNP SERPL-MCNC: 3051 PG/ML (ref 0–229)
POTASSIUM SERPL-SCNC: 4.5 MMOL/L (ref 3.4–5.3)
SODIUM SERPL-SCNC: 141 MMOL/L (ref 135–145)

## 2025-06-25 RX ORDER — BUMETANIDE 1 MG/1
1 TABLET ORAL DAILY
Qty: 90 TABLET | Refills: 3 | Status: SHIPPED | OUTPATIENT
Start: 2025-06-25

## 2025-06-25 RX ORDER — CHLORTHALIDONE 25 MG/1
25 TABLET ORAL DAILY
Qty: 90 TABLET | Refills: 3 | Status: SHIPPED | OUTPATIENT
Start: 2025-06-25 | End: 2025-06-25

## 2025-06-25 NOTE — PROGRESS NOTES
[facility]      Referring provider: Sancho Rushing MD    HPI: Mr. Erick Yusuf is a 71 year old  male with PMH significant for    -Hypertension  -Family history of premature CAD    Patient was recently seen in primary care clinic by Dr. Rushing on 6/29/2022.  He was found to have irregular heartbeat on EKG.  Subsequently underwent Zio patch for 2 days which showed frequent PACs (19%), 60 episodes of nonsustained SVT longest lasting 12 seconds.  Patient tells me that he did not feel anything unusual when he was wearing the heart monitor.  Denies chest pain, shortness of breath, palpitations, dizziness or syncope.  Reports lower extremity edema more so over the last 1 year (since he started amlodipine).  Reports frequent cough after travels.    Does not smoke.  Alcohol occasional.  No diabetes.  Reports high blood pressure at home.  Blood pressure is also high in clinic today.  He does not monitor BP very frequently.  He has history of hypertension for over 20 years.  Family history is significant for premature CAD.  His brother had CABG in his 40s.     Echocardiogram on 5/6/2022 shows normal biventricular function, mild LVH, no significant valve disease.    Patient is currently on amlodipine 10 mg, hydrochlorothiazide 25 mg and simvastatin 20 mg.    Medications, personal, family, and social history reviewed with patient and revised.    Interval history 12/9/2022  Patient is being seen today to discuss recent test results including CT coronary calcium which showed severe elevated coronary calcium score and to recheck blood pressure control.  Underwent exercise stress echocardiogram 9/8/2022 which was submaximal with no stress-induced ischemia.  Patient was able to achieve only 82% of maximal heart rate.    Patient brought a blood pressure log from home which shows blood pressure in the range of  125-135/70-85 mmHg.  He reports no cardiac symptoms.  He is physically active.    Interval history 6/24/2024:  Patient is being seen today for 2-year visit.  He is asymptomatic from cardiac standpoint.  I reviewed EKG which shows bigeminal PVCs.  Denies palpitations or feeling skipped beats.      Alcohol very occasional.  Does not smoke.  Blood pressure is well-controlled.    Interval history 6/25/2025:  Returning for annual visit.  Underwent stress echocardiogram yesterday but he was not able to reach the target heart rate.  He achieved 71% of the maximum heart rate.  Normal resting LV function with no significant valve disease.  LVH noted on baseline echocardiogram.  Patient exercised less than 3 minutes.  Poor functional capacity.  There was hypertensive blood pressure response with baseline hypertension at 150/80 mmHg.  Patient stopped exercise due to dyspnea and fatigue.    - Experienced increased fatigue and inability to complete exercise routines, such as playing golf and working out at the [employer], due to tiredness and shortness of breath over the last few weeks.  - Developed a persistent cough that disrupts sleep, particularly when lying down, ongoing for a month and a half.  - Noted fluid retention in legs and weight gain despite reduced food intake, occurring over the last several weeks.  - Underwent a stress test on June 24, 2025, but could not complete it due to shortness of breath and fatigue.  - Had a chest X-ray showing fluid in the lungs, leading to a prescription of antibiotics, which have provided some improvement.  - Blood pressure has been higher than usual, particularly when on antibiotics.  - Medication changes by Dr. Bo in April 2025 included stopping hydrochlorothiazide and increasing amlodipine dosage.  He tells me he did not have these symptoms prior to coming off of hydrochlorothiazide.    PAST MEDICAL HISTORY:  Past Medical History:   Diagnosis Date    HTN (hypertension)      Hypercholesteremia     Leucopenia        CURRENT MEDICATIONS:  Current Outpatient Medications   Medication Sig Dispense Refill    amLODIPine (NORVASC) 10 MG tablet Take 1 tablet (10 mg) by mouth daily. 90 tablet 2    amoxicillin-clavulanate (AUGMENTIN) 875-125 MG tablet Take 1 tablet by mouth 2 times daily. 42 tablet 0    aspirin (ASA) 81 MG EC tablet Take 1 tablet (81 mg) by mouth daily 90 tablet 3    finasteride (PROSCAR) 5 MG tablet Take 1 tablet by mouth daily at 2 pm.      multivitamin w/minerals (THERA-VIT-M) tablet Take 1 tablet by mouth daily.      rosuvastatin (CRESTOR) 10 MG tablet Take 1 tablet (10 mg) by mouth daily. 90 tablet 3    spironolactone (ALDACTONE) 25 MG tablet Take 1 tablet (25 mg) by mouth daily. 90 tablet 3    tadalafil (CIALIS) 5 MG tablet Take 1 tablet (5 mg) by mouth every 24 hours. 30 tablet 0       PAST SURGICAL HISTORY:  Past Surgical History:   Procedure Laterality Date    NO HISTORY OF SURGERY         ALLERGIES:     Allergies   Allergen Reactions    Prednisone Swelling     Al over severe body swelling       FAMILY HISTORY:  Family History   Problem Relation Age of Onset    Diabetes Father     Heart Disease Brother          SOCIAL HISTORY:  Social History     Tobacco Use    Smoking status: Never    Smokeless tobacco: Never   Vaping Use    Vaping status: Never Used   Substance Use Topics    Alcohol use: Not Currently     Comment: occasionally    Drug use: No       ROS:   Constitutional: No fever, chills, or sweats. Weight stable.   Cardiovascular: As per HPI.       Exam:  BP (!) 147/86 (BP Location: Right arm, Patient Position: Chair, Cuff Size: Adult Regular)   Pulse 91   Wt 92.1 kg (203 lb)   SpO2 98%   BMI 30.21 kg/m    GENERAL APPEARANCE: alert and no distress  HEENT: no icterus, no central cyanosis  Lungs: Crackles noted on the right side basal segments  CARDIOVASCULAR: regular rhythm, normal S1, S2, no S3 or S4 and no murmur, click or rub, precordium quiet with normal  PMI.  EXTREMITIES: 2+ pretibial edema both sides.  NEURO: alert, normal speech,and affect  SKIN: no ecchymoses, no rashes     I have reviewed the labs and personally reviewed the imaging below and made my comment in the assessment and plan.    Labs:  CBC RESULTS:   Lab Results   Component Value Date    WBC 5.7 06/11/2025    WBC 4.7 09/17/2015    RBC 4.56 06/11/2025    RBC 4.75 09/17/2015    HGB 14.3 06/11/2025    HGB 14.7 09/17/2015    HCT 43.1 06/11/2025    HCT 42.8 09/17/2015    MCV 95 06/11/2025    MCV 90 09/17/2015    MCH 31.4 06/11/2025    MCH 30.9 09/17/2015    MCHC 33.2 06/11/2025    MCHC 34.3 09/17/2015    RDW 13.5 06/11/2025    RDW 13.2 09/17/2015    PLT 83 (L) 06/11/2025     (L) 09/17/2015       BMP RESULTS:  Lab Results   Component Value Date     06/05/2025     03/01/2021    POTASSIUM 4.4 06/05/2025    POTASSIUM 4.0 08/19/2022    POTASSIUM 3.7 03/01/2021    CHLORIDE 105 06/05/2025    CHLORIDE 105 08/19/2022    CHLORIDE 105 03/01/2021    CO2 21 (L) 06/05/2025    CO2 28 08/19/2022    CO2 31 03/01/2021    ANIONGAP 13 06/05/2025    ANIONGAP 6 08/19/2022    ANIONGAP 3 03/01/2021     (H) 06/05/2025     (H) 08/19/2022     (H) 03/01/2021    BUN 19.5 06/05/2025    BUN 18 08/19/2022    BUN 17 03/01/2021    CR 1.18 (H) 06/05/2025    CR 1.10 03/01/2021    GFRESTIMATED 65 06/05/2025    GFRESTIMATED 68 03/01/2021    GFRESTBLACK 78 03/01/2021    ALIZA 10.0 06/05/2025    ALIZA 9.6 03/01/2021 6/29/22: 3 day zio:   SR with 60 runs SVT- longest 13 seconds-rte 101 bpm  PAC- 20% burden  Frequent AT        CT calcium screening 8/17/2022  Severe coronary artery calcification total score 1425 placing the patient in the 89th percentile.    Exercise stress echocardiogram 6/24/2025  1. Normal stress echocardiogram without evidence of stress induced ischemia,  but with reduced sensitivity due to not attaining target heart rate (patient  achieved 71% predicted maximum heart rate for age).  2.  Normal resting LV systolic performance with an ejection fraction of 55%.  There is only mild improvement in left ventricular systolic performance with a  peak ejection fraction of 65% (patient exercised under less than 3 minutes).  3. No ECG evidence of ischemia.  4. No anginal chest pain reported with exercise.  5. Poor functional capacity for age.  6. 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 Wenckebach  phenomenon was also seen in the recovery phase as well as first-degree AV  block. Occasional PVCs versus PACs with aberrancy were noted.  7. Baseline hypertension (154/80 to 179/107 mmHg).     Stress Summary: Patient exercised on the Som protocol for a total of 2:45  minutes. Peak heart rate achieved was 104 b.p.m (71% max.) with a double-  product of 22,470. There was normal & appropriate blood pressure response  with  exercise. The patient stopped exercise due to dyspnea & generalized fatigue.  No chest pain symptoms were reported.     Electrocardiogram: Baseline ECG reveals normal sinus rhythm without evidence  of prior myocardial injury. With exercise, there are no significant ECG  changes to suggest ischemia.     Baseline echocardiogram: Technically adequate images were obtained in the  standard quad-screen format. LV systolic performance is normal with a visually  estimated ejection fraction of 55%. There is normal regional wall motion.  There is mild increase in left ventricular wall thickness. The left atrium  appears enlarged. No significant valvular heart disease is identified on  limited screening Doppler.     Postexercise echocardiogram: Technically adequate images were obtained  immediately post exercise in the standard quad-screen format. LV systolic  performance mildly improves with a peak ejection fraction of 65%. There are no  stress induced regional wall motion abnormalities.    CT chest 6/12/2025  1. Mild to moderate bilateral pleural effusion associated with  atelectasis  and bilateral lower lobe bronchial thickening, greater on  the left.   2. Atherosclerotic calcification of the coronary arteries.    Assessment and Plan:   Patient is presenting today with new onset of shortness of breath with activity, lower extremity edema, PND, orthopnea and cough when he lies down over the last 1 month.  Patient tells me that all these symptoms started when he was taken off of hydrochlorothiazide a month ago.  So far he had CT chest and chest x-ray which showed bilateral pleural effusion.  He was not able to complete exercise stress echo yesterday which showed normal LVEF but he has mild to moderate LVH.  No significant valve disease.  I am highly suspecting he has new HFpEF which likely exacerbated after come off of hydrochlorothiazide.  He was coughing throughout the clinic visit today.    1. Heart failure with preserved ejection fraction (HFpEF), new diagnosis, functional class II:     - Indicated by fluid around the heart and lungs, weight gain, and difficulty lying flat.  He is likely decompensated now but does not feel like he needs to go to hospital.     - Thickened heart muscle not relaxing well, likely due to long-standing hypertension.     - Plan: Start Bumex 1 mg once daily in the morning to manage fluid retention. Monitor for potassium depletion.  He will weigh himself daily.  Recommend to cut down on salt intake        -Follow up with  [facility] to follow-up on HFpEF in approximately 1 month.        - Will check NT proBNP today        - Schedule PYP scan to rule out cardiac amyloid    2. Hypertension:     - Blood pressure has been higher than usual     - Plan: Continue current medications including amlodipine 10 mg. Monitor blood pressure regularly.    3. Coronary artery disease (severe coronary calcifications noted on previous CT scans):     - Significant coronary artery calcifications noted from previous CAT scan.     - Plan: Consider further evaluation if symptoms persist,  including a potential angiogram.    4. Cough and dyspnea:     - Likely related to heart failure rather than infection.  These are new over the last few weeks.     - Plan: Complete current course of antibiotics. Monitor symptoms and adjust treatment as necessary.      - He is scheduled to see pulmonology    5 .Asymptomatic frequent PACs and nonsustained SVT (19.7% burden as assessed by Zio patch 6/29/2022)  - Not symptomatic.  Will continue to monitor.    6. Severe coronary calcifications on CT calcium screening 8/17/2022 (total calcium score 1425)  - He was not able to complete recent stress test due to shortness of breath.  - Will consider coronary angiogram once he is more euvolemic especially if GIBBS does not resolve.  Will decide about this in 3 months when I see him back in clinic.    Prescription:  - Bumex 1 mg, once daily in the morning. May cause increased urination. Monitor for potassium depletion.    Appointments:  - Follow-up appointment in 3 months with cardiologist.  - Appointment with heart failure nurse practitioner in approximately 1 month.  - Pulmonologist appointment scheduled for a week from now; advised to keep this appointment.    Return to clinic with me in 3 months with me.    Total time spent today for this visit is 93 minutes including precharting, face-to-face clinic visit, review of labs/imaging and medical documentation.    The longitudinal plan of care for the diagnosis(es)/condition(s) as documented were addressed during this visit. Due to the added complexity in care, I will continue to support Derrick in the subsequent management and with ongoing continuity of care.     Joya GORDON MD  [facility] Division of Cardiology  Pager [phone]

## 2025-06-25 NOTE — CONFIDENTIAL NOTE
RECORDS RECEIVED FROM: internal    DATE RECEIVED: 7.1.25    NOTES STATUS DETAILS   OFFICE NOTE from referring provider internal    Sancho Rushing MD      MEDICATION LIST internal     IMAGING  (NEED IMAGES AND REPORTS)     CT SCAN internal  6.12.25   CHEST XRAY (CXR) internal  6.11.25, 6.28.23    TESTS     PULMONARY FUNCTION TESTING (PFT) internal  Scheduled 7.1.25

## 2025-06-25 NOTE — PATIENT INSTRUCTIONS
Thank you for coming to the [facility] [place]; please note the following instructions:    1. Lab today and lab this Friday 6/27    2.  START bumex 1 mg daily    3.  Weigh daily and record the readings    4.   Watch salt intake-goal is 2000 mg daily    5.  Establish care with heart failure clinic ([facility])-you will need labs day prior    6.  Nuclear imaging test to evaluate the heart    7.  Follow-up IN 3 MONTHS        If you have any questions regarding your visit please contact your care team:     Cardiology  Telephone Number   Helga CANO, RN  Sylvia RIDDLE, RN  Ade URBANO, RN  Diya HIDALGO, PALOMA SANZ, PALOMA SALGUERO, [place]  Laila RIDDLE, JERAD Espinoza., [phone] (option 1)   For scheduling appts:     [phone] (select option 1)       For the Device Clinic (Pacemakers and ICD's)  RN's :  Bibiana Junior   During business hours: [phone]    *After business hours:  [phone] (select option 4)      VIRTUAL VISITS: If you have had a virtual visit and have testing needing to be scheduled, please call [phone]. Otherwise please allow 24-48 hours for staff to reach out to assist in scheduling.     Normal test result notifications will be released via [address] or mailed within 7 business days.  All other test results, will be communicated via telephone once reviewed by your cardiologist.    If you need a medication refill please contact your pharmacy.  Please allow 3 business days for your refill to be completed.    As always, thank you for trusting us with your health care needs!

## 2025-06-25 NOTE — LETTER
6/25/2025      RE: Erick Yusuf  [address]  [place] [address]       Dear Colleague,    Thank you for the opportunity to participate in the care of your patient, Erick Yusuf, at the [facility]. Please see a copy of my visit note below.                                                                                  General Cardiology [facility]      Referring provider: Sancho Rushing MD    HPI: Mr. Erick Yusuf is a 71 year old  male with PMH significant for    -Hypertension  -Family history of premature CAD    Patient was recently seen in primary care clinic by Dr. Rushing on 6/29/2022.  He was found to have irregular heartbeat on EKG.  Subsequently underwent Zio patch for 2 days which showed frequent PACs (19%), 60 episodes of nonsustained SVT longest lasting 12 seconds.  Patient tells me that he did not feel anything unusual when he was wearing the heart monitor.  Denies chest pain, shortness of breath, palpitations, dizziness or syncope.  Reports lower extremity edema more so over the last 1 year (since he started amlodipine).  Reports frequent cough after travels.    Does not smoke.  Alcohol occasional.  No diabetes.  Reports high blood pressure at home.  Blood pressure is also high in clinic today.  He does not monitor BP very frequently.  He has history of hypertension for over 20 years.  Family history is significant for premature CAD.  His brother had CABG in his 40s.     Echocardiogram on 5/6/2022 shows normal biventricular function, mild LVH, no significant valve disease.    Patient is currently on amlodipine 10 mg, hydrochlorothiazide 25 mg and simvastatin 20 mg.    Medications, personal, family, and social history reviewed with patient and revised.    Interval history 12/9/2022  Patient is being seen today to discuss recent test results including CT coronary calcium which  showed severe elevated coronary calcium score and to recheck blood pressure control.  Underwent exercise stress echocardiogram 9/8/2022 which was submaximal with no stress-induced ischemia.  Patient was able to achieve only 82% of maximal heart rate.    Patient brought a blood pressure log from home which shows blood pressure in the range of 125-135/70-85 mmHg.  He reports no cardiac symptoms.  He is physically active.    Interval history 6/24/2024:  Patient is being seen today for 2-year visit.  He is asymptomatic from cardiac standpoint.  I reviewed EKG which shows bigeminal PVCs.  Denies palpitations or feeling skipped beats.      Alcohol very occasional.  Does not smoke.  Blood pressure is well-controlled.    Interval history 6/25/2025:  Returning for annual visit.  Underwent stress echocardiogram yesterday but he was not able to reach the target heart rate.  He achieved 71% of the maximum heart rate.  Normal resting LV function with no significant valve disease.  LVH noted on baseline echocardiogram.  Patient exercised less than 3 minutes.  Poor functional capacity.  There was hypertensive blood pressure response with baseline hypertension at 150/80 mmHg.  Patient stopped exercise due to dyspnea and fatigue.    - Experienced increased fatigue and inability to complete exercise routines, such as playing golf and working out at the "[employer] due to tiredness and shortness of breath over the last few weeks.  - Developed a persistent cough that disrupts sleep, particularly when lying down, ongoing for a month and a half.  - Noted fluid retention in legs and weight gain despite reduced food intake, occurring over the last several weeks.  - Underwent a stress test on June 24, 2025, but could not complete it due to shortness of breath and fatigue.  - Had a chest X-ray showing fluid in the lungs, leading to a prescription of antibiotics, which have provided some improvement.  - Blood pressure has been higher than usual,  particularly when on antibiotics.  - Medication changes by Dr. Bo in April 2025 included stopping hydrochlorothiazide and increasing amlodipine dosage.  He tells me he did not have these symptoms prior to coming off of hydrochlorothiazide.    PAST MEDICAL HISTORY:  Past Medical History:   Diagnosis Date     HTN (hypertension)      Hypercholesteremia      Leucopenia        CURRENT MEDICATIONS:  Current Outpatient Medications   Medication Sig Dispense Refill     amLODIPine (NORVASC) 10 MG tablet Take 1 tablet (10 mg) by mouth daily. 90 tablet 2     amoxicillin-clavulanate (AUGMENTIN) 875-125 MG tablet Take 1 tablet by mouth 2 times daily. 42 tablet 0     aspirin (ASA) 81 MG EC tablet Take 1 tablet (81 mg) by mouth daily 90 tablet 3     finasteride (PROSCAR) 5 MG tablet Take 1 tablet by mouth daily at 2 pm.       multivitamin w/minerals (THERA-VIT-M) tablet Take 1 tablet by mouth daily.       rosuvastatin (CRESTOR) 10 MG tablet Take 1 tablet (10 mg) by mouth daily. 90 tablet 3     spironolactone (ALDACTONE) 25 MG tablet Take 1 tablet (25 mg) by mouth daily. 90 tablet 3     tadalafil (CIALIS) 5 MG tablet Take 1 tablet (5 mg) by mouth every 24 hours. 30 tablet 0       PAST SURGICAL HISTORY:  Past Surgical History:   Procedure Laterality Date     NO HISTORY OF SURGERY         ALLERGIES:     Allergies   Allergen Reactions     Prednisone Swelling     Al over severe body swelling       FAMILY HISTORY:  Family History   Problem Relation Age of Onset     Diabetes Father      Heart Disease Brother          SOCIAL HISTORY:  Social History     Tobacco Use     Smoking status: Never     Smokeless tobacco: Never   Vaping Use     Vaping status: Never Used   Substance Use Topics     Alcohol use: Not Currently     Comment: occasionally     Drug use: No       ROS:   Constitutional: No fever, chills, or sweats. Weight stable.   Cardiovascular: As per HPI.       Exam:  BP (!) 147/86 (BP Location: Right arm, Patient Position: Chair,  Cuff Size: Adult Regular)   Pulse 91   Wt 92.1 kg (203 lb)   SpO2 98%   BMI 30.21 kg/m    GENERAL APPEARANCE: alert and no distress  HEENT: no icterus, no central cyanosis  Lungs: Crackles noted on the right side basal segments  CARDIOVASCULAR: regular rhythm, normal S1, S2, no S3 or S4 and no murmur, click or rub, precordium quiet with normal PMI.  EXTREMITIES: 2+ pretibial edema both sides.  NEURO: alert, normal speech,and affect  SKIN: no ecchymoses, no rashes     I have reviewed the labs and personally reviewed the imaging below and made my comment in the assessment and plan.    Labs:  CBC RESULTS:   Lab Results   Component Value Date    WBC 5.7 06/11/2025    WBC 4.7 09/17/2015    RBC 4.56 06/11/2025    RBC 4.75 09/17/2015    HGB 14.3 06/11/2025    HGB 14.7 09/17/2015    HCT 43.1 06/11/2025    HCT 42.8 09/17/2015    MCV 95 06/11/2025    MCV 90 09/17/2015    MCH 31.4 06/11/2025    MCH 30.9 09/17/2015    MCHC 33.2 06/11/2025    MCHC 34.3 09/17/2015    RDW 13.5 06/11/2025    RDW 13.2 09/17/2015    PLT 83 (L) 06/11/2025     (L) 09/17/2015       BMP RESULTS:  Lab Results   Component Value Date     06/05/2025     03/01/2021    POTASSIUM 4.4 06/05/2025    POTASSIUM 4.0 08/19/2022    POTASSIUM 3.7 03/01/2021    CHLORIDE 105 06/05/2025    CHLORIDE 105 08/19/2022    CHLORIDE 105 03/01/2021    CO2 21 (L) 06/05/2025    CO2 28 08/19/2022    CO2 31 03/01/2021    ANIONGAP 13 06/05/2025    ANIONGAP 6 08/19/2022    ANIONGAP 3 03/01/2021     (H) 06/05/2025     (H) 08/19/2022     (H) 03/01/2021    BUN 19.5 06/05/2025    BUN 18 08/19/2022    BUN 17 03/01/2021    CR 1.18 (H) 06/05/2025    CR 1.10 03/01/2021    GFRESTIMATED 65 06/05/2025    GFRESTIMATED 68 03/01/2021    GFRESTBLACK 78 03/01/2021    ALIZA 10.0 06/05/2025    ALIZA 9.6 03/01/2021 6/29/22: 3 day zio:   SR with 60 runs SVT- longest 13 seconds-rte 101 bpm  PAC- 20% burden  Frequent AT        CT calcium screening  8/17/2022  Severe coronary artery calcification total score 1425 placing the patient in the 89th percentile.    Exercise stress echocardiogram 6/24/2025  1. Normal stress echocardiogram without evidence of stress induced ischemia,  but with reduced sensitivity due to not attaining target heart rate (patient  achieved 71% predicted maximum heart rate for age).  2. Normal resting LV systolic performance with an ejection fraction of 55%.  There is only mild improvement in left ventricular systolic performance with a  peak ejection fraction of 65% (patient exercised under less than 3 minutes).  3. No ECG evidence of ischemia.  4. No anginal chest pain reported with exercise.  5. Poor functional capacity for age.  6. 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 Wenckebach  phenomenon was also seen in the recovery phase as well as first-degree AV  block. Occasional PVCs versus PACs with aberrancy were noted.  7. Baseline hypertension (154/80 to 179/107 mmHg).     Stress Summary: Patient exercised on the Som protocol for a total of 2:45  minutes. Peak heart rate achieved was 104 b.p.m (71% max.) with a double-  product of 22,470. There was normal & appropriate blood pressure response  with  exercise. The patient stopped exercise due to dyspnea & generalized fatigue.  No chest pain symptoms were reported.     Electrocardiogram: Baseline ECG reveals normal sinus rhythm without evidence  of prior myocardial injury. With exercise, there are no significant ECG  changes to suggest ischemia.     Baseline echocardiogram: Technically adequate images were obtained in the  standard quad-screen format. LV systolic performance is normal with a visually  estimated ejection fraction of 55%. There is normal regional wall motion.  There is mild increase in left ventricular wall thickness. The left atrium  appears enlarged. No significant valvular heart disease is identified on  limited screening Doppler.     Postexercise  echocardiogram: Technically adequate images were obtained  immediately post exercise in the standard quad-screen format. LV systolic  performance mildly improves with a peak ejection fraction of 65%. There are no  stress induced regional wall motion abnormalities.    CT chest 6/12/2025  1. Mild to moderate bilateral pleural effusion associated with  atelectasis and bilateral lower lobe bronchial thickening, greater on  the left.   2. Atherosclerotic calcification of the coronary arteries.    Assessment and Plan:   Patient is presenting today with new onset of shortness of breath with activity, lower extremity edema, PND, orthopnea and cough when he lies down over the last 1 month.  Patient tells me that all these symptoms started when he was taken off of hydrochlorothiazide a month ago.  So far he had CT chest and chest x-ray which showed bilateral pleural effusion.  He was not able to complete exercise stress echo yesterday which showed normal LVEF but he has mild to moderate LVH.  No significant valve disease.  I am highly suspecting he has new HFpEF which likely exacerbated after come off of hydrochlorothiazide.  He was coughing throughout the clinic visit today.    1. Heart failure with preserved ejection fraction (HFpEF), new diagnosis, functional class II:     - Indicated by fluid around the heart and lungs, weight gain, and difficulty lying flat.  He is likely decompensated now but does not feel like he needs to go to hospital.     - Thickened heart muscle not relaxing well, likely due to long-standing hypertension.     - Plan: Start Bumex 1 mg once daily in the morning to manage fluid retention. Monitor for potassium depletion.  He will weigh himself daily.  Recommend to cut down on salt intake        -Follow up with  [facility] to follow-up on HFpEF in approximately 1 month.        - Will check NT proBNP today        - Schedule PYP scan to rule out cardiac amyloid    2. Hypertension:     - Blood pressure  has been higher than usual     - Plan: Continue current medications including amlodipine 10 mg. Monitor blood pressure regularly.    3. Coronary artery disease (severe coronary calcifications noted on previous CT scans):     - Significant coronary artery calcifications noted from previous CAT scan.     - Plan: Consider further evaluation if symptoms persist, including a potential angiogram.    4. Cough and dyspnea:     - Likely related to heart failure rather than infection.  These are new over the last few weeks.     - Plan: Complete current course of antibiotics. Monitor symptoms and adjust treatment as necessary.      - He is scheduled to see pulmonology    5 .Asymptomatic frequent PACs and nonsustained SVT (19.7% burden as assessed by Zio patch 6/29/2022)  - Not symptomatic.  Will continue to monitor.    6. Severe coronary calcifications on CT calcium screening 8/17/2022 (total calcium score 1425)  - He was not able to complete recent stress test due to shortness of breath.  - Will consider coronary angiogram once he is more euvolemic especially if GIBBS does not resolve.  Will decide about this in 3 months when I see him back in clinic.    Prescription:  - Bumex 1 mg, once daily in the morning. May cause increased urination. Monitor for potassium depletion.    Appointments:  - Follow-up appointment in 3 months with cardiologist.  - Appointment with heart failure nurse practitioner in approximately 1 month.  - Pulmonologist appointment scheduled for a week from now; advised to keep this appointment.    Return to clinic with me in 3 months with me.    Total time spent today for this visit is 93 minutes including precharting, face-to-face clinic visit, review of labs/imaging and medical documentation.    The longitudinal plan of care for the diagnosis(es)/condition(s) as documented were addressed during this visit. Due to the added complexity in care, I will continue to support Derrick in the subsequent management and  with ongoing continuity of care.     Joya GORDON MD  [facility] Division of Cardiology  Pager [phone]    Please do not hesitate to contact me if you have any questions/concerns.     Sincerely,     Joya Gordon MD

## 2025-06-25 NOTE — NURSING NOTE
"Chief Complaint   Patient presents with    RECHECK     Return general cardiology for symptoms    Shortness of Breath    Dizziness    Fatigue    Edema       Initial BP (!) 147/86 (BP Location: Right arm, Patient Position: Chair, Cuff Size: Adult Regular)   Pulse 91   Wt 92.1 kg (203 lb)   SpO2 98%   BMI 30.21 kg/m   Estimated body mass index is 30.21 kg/m  as calculated from the following:    Height as of 6/11/25: 1.746 m (5' 8.74\").    Weight as of this encounter: 92.1 kg (203 lb)..  BP completed using cuff size: regular    PALOMA Rehman    "

## 2025-06-29 DIAGNOSIS — R05.8 COUGH PRODUCTIVE OF CLEAR SPUTUM: ICD-10-CM

## 2025-06-30 NOTE — PROGRESS NOTES
Pulmonary Clinic Note    Date of Service: 7/1/2025     Chief Complaint   Patient presents with    New Pulmonary       A/P:  74M HTN, HLD, HFpEF being seen for pleural effusion and cough. Bilateral pleural effusions on CT imaging. PFTs w/ moderate restriction which I think is related to pleural effusions. He feels better after starting diuretics. His effusions are presumably related to HFpEF. I do not think any further pulmonary work up is needed.     History:  74M HTN, HLD, HFpEF being seen for pleural effusion and cough. He is not prescribed any pulmonary medications. Recently given amoxicillin-clavulanate by PCP. He follows w/ cardiology who at last visit felt GIBBS was more cardiac in nature. He was feeling orthopnea and mild GIBBS. Did notice some PND. No SOB at rest. He had a frequent cough w/ brown sputum. Did hear some wheezing. Did have some LE edema. He's started bumetanide for about a week and feels much better.     Currently, no GIBBS. No SOB at rest. No orthopnea or PND. No chest pain or tightness. No longer hearing wheezing. Rare cough. No nocturnal cough. No LE edema. No personal hx of pulmonary dz. No FHx of pulmonary dz.     Smoking: never       Recent travel: within [place]                       Bird exposure: no             Animal exposure: cat        Inhalation exposure: no    Occupation: retired, former                           10 point review of systems negative, aside from that mentioned in HPI.    BP (!) 159/99 (BP Location: Right arm, Patient Position: Sitting, Cuff Size: Adult Regular)   Pulse 92   SpO2 96%   Gen: well-appearing  HEENT: Mallampati IV  Card: RRR  Pulm: clear bilaterally   Abd: soft  MSK: no edema, no acute joint abnormality   Skin: no obvious rash  Psych: normal affect  Neuro: alert and oriented     Labs:  Personally reviewed  NT-BNP (6/2025) - 3,051    Imaging/Studies: Personally reviewed  PFTs (7/2025) - moderate restriction, normal diffusion   CT Chest (6/2025)  - mild to moderate b/l pleural effusion and b/l lower lobe bronchial thickening  CXR (6/2025) - new onset small b/l pleural effusions, mild cardiac enlargement and slight venous congestion   CXR (6/2023) - clear     Stress TTE (6/2025) - normal stress echo, mild LV wall thickening, LA appears enlarged    Past Medical History:   Diagnosis Date    HTN (hypertension)     Hypercholesteremia     Leucopenia      Past Surgical History:   Procedure Laterality Date    NO HISTORY OF SURGERY       Family History   Problem Relation Age of Onset    Diabetes Father     Heart Disease Brother      Social History     Socioeconomic History    Marital status:      Spouse name: Not on file    Number of children: Not on file    Years of education: Not on file    Highest education level: Not on file   Occupational History    Not on file   Tobacco Use    Smoking status: Never    Smokeless tobacco: Never   Vaping Use    Vaping status: Never Used   Substance and Sexual Activity    Alcohol use: Not Currently     Comment: occasionally    Drug use: No    Sexual activity: Not Currently     Partners: Female   Other Topics Concern    Parent/sibling w/ CABG, MI or angioplasty before 65F 55M? Not Asked   Social History Narrative    Not on file     Social Drivers of Health     Financial Resource Strain: Low Risk  (4/23/2025)    Financial Resource Strain     Within the past 12 months, have you or your family members you live with been unable to get utilities (heat, electricity) when it was really needed?: No   Food Insecurity: Low Risk  (4/23/2025)    Food Insecurity     Within the past 12 months, did you worry that your food would run out before you got money to buy more?: No     Within the past 12 months, did the food you bought just not last and you didn t have money to get more?: No   Transportation Needs: Low Risk  (4/23/2025)    Transportation Needs     Within the past 12 months, has lack of transportation kept you from medical  appointments, getting your medicines, non-medical meetings or appointments, work, or from getting things that you need?: No   Physical Activity: Unknown (4/23/2025)    Exercise Vital Sign     Days of Exercise per Week: 4 days     Minutes of Exercise per Session: Not on file   Stress: No Stress Concern Present (4/23/2025)    [employer] [employer] - Occupational Stress Questionnaire     Feeling of Stress : Not at all   Social Connections: Unknown (4/23/2025)    Social Connection and Isolation Panel [NHANES]     Frequency of Communication with Friends and Family: Not on file     Frequency of Social Gatherings with Friends and Family: Once a week     Attends Buddhist Services: Not on file     Active Member of Clubs or Organizations: Not on file     Attends Club or Organization Meetings: Not on file     Marital Status: Not on file   Interpersonal Safety: Low Risk  (4/23/2025)    Interpersonal Safety     Do you feel physically and emotionally safe where you currently live?: Yes     Within the past 12 months, have you been hit, slapped, kicked or otherwise physically hurt by someone?: No     Within the past 12 months, have you been humiliated or emotionally abused in other ways by your partner or ex-partner?: No   Housing Stability: Low Risk  (4/23/2025)    Housing Stability     Do you have housing? : Yes     Are you worried about losing your housing?: No       50 minutes spent reviewing chart, reviewing test results, talking with and examining patient, formulating plan, and documentation on the day of the encounter.    Erick Alfonso MD  Pulmonary and Critical Care Medicine  [facility]

## 2025-07-01 ENCOUNTER — PRE VISIT (OUTPATIENT)
Dept: PULMONOLOGY | Facility: CLINIC | Age: 75
End: 2025-07-01

## 2025-07-01 ENCOUNTER — OFFICE VISIT (OUTPATIENT)
Dept: PULMONOLOGY | Facility: CLINIC | Age: 75
End: 2025-07-01
Attending: FAMILY MEDICINE
Payer: COMMERCIAL

## 2025-07-01 VITALS — SYSTOLIC BLOOD PRESSURE: 159 MMHG | OXYGEN SATURATION: 96 % | HEART RATE: 92 BPM | DIASTOLIC BLOOD PRESSURE: 99 MMHG

## 2025-07-01 DIAGNOSIS — J91.8 PLEURAL EFFUSION ASSOCIATED WITH PULMONARY INFECTION: ICD-10-CM

## 2025-07-01 DIAGNOSIS — J18.9 PLEURAL EFFUSION ASSOCIATED WITH PULMONARY INFECTION: ICD-10-CM

## 2025-07-01 DIAGNOSIS — J90 PLEURAL EFFUSION: Primary | ICD-10-CM

## 2025-07-01 PROCEDURE — 1126F AMNT PAIN NOTED NONE PRSNT: CPT | Performed by: STUDENT IN AN ORGANIZED HEALTH CARE EDUCATION/TRAINING PROGRAM

## 2025-07-01 PROCEDURE — 94726 PLETHYSMOGRAPHY LUNG VOLUMES: CPT | Performed by: INTERNAL MEDICINE

## 2025-07-01 PROCEDURE — 3080F DIAST BP >= 90 MM HG: CPT | Performed by: STUDENT IN AN ORGANIZED HEALTH CARE EDUCATION/TRAINING PROGRAM

## 2025-07-01 PROCEDURE — G0463 HOSPITAL OUTPT CLINIC VISIT: HCPCS | Performed by: STUDENT IN AN ORGANIZED HEALTH CARE EDUCATION/TRAINING PROGRAM

## 2025-07-01 PROCEDURE — 99204 OFFICE O/P NEW MOD 45 MIN: CPT | Mod: 25 | Performed by: STUDENT IN AN ORGANIZED HEALTH CARE EDUCATION/TRAINING PROGRAM

## 2025-07-01 PROCEDURE — 94729 DIFFUSING CAPACITY: CPT | Performed by: INTERNAL MEDICINE

## 2025-07-01 PROCEDURE — 94375 RESPIRATORY FLOW VOLUME LOOP: CPT | Performed by: INTERNAL MEDICINE

## 2025-07-01 PROCEDURE — 94150 VITAL CAPACITY TEST: CPT | Performed by: INTERNAL MEDICINE

## 2025-07-01 PROCEDURE — 3077F SYST BP >= 140 MM HG: CPT | Performed by: STUDENT IN AN ORGANIZED HEALTH CARE EDUCATION/TRAINING PROGRAM

## 2025-07-01 ASSESSMENT — PAIN SCALES - GENERAL: PAINLEVEL_OUTOF10: NO PAIN (0)

## 2025-07-01 NOTE — PATIENT INSTRUCTIONS
I think your pleural effusions are related to your heart. I think your lungs are doing just fine. No further pulmonary work up or follow up is needed.   
0 = independent

## 2025-07-01 NOTE — LETTER
7/1/2025      Erick Yusuf  [address]  [place] [address]      Dear Colleague,    Thank you for referring your patient, Erick Yusuf, to the [facility] [place]. Please see a copy of my visit note below.    Pulmonary Clinic Note    Date of Service: 7/1/2025     Chief Complaint   Patient presents with     New Pulmonary       A/P:  74M HTN, HLD, HFpEF being seen for pleural effusion and cough. Bilateral pleural effusions on CT imaging. PFTs w/ moderate restriction which I think is related to pleural effusions. He feels better after starting diuretics. His effusions are presumably related to HFpEF. I do not think any further pulmonary work up is needed.     History:  74M HTN, HLD, HFpEF being seen for pleural effusion and cough. He is not prescribed any pulmonary medications. Recently given amoxicillin-clavulanate by PCP. He follows w/ cardiology who at last visit felt GIBBS was more cardiac in nature. He was feeling orthopnea and mild GIBBS. Did notice some PND. No SOB at rest. He had a frequent cough w/ brown sputum. Did hear some wheezing. Did have some LE edema. He's started bumetanide for about a week and feels much better.     Currently, no GIBBS. No SOB at rest. No orthopnea or PND. No chest pain or tightness. No longer hearing wheezing. Rare cough. No nocturnal cough. No LE edema. No personal hx of pulmonary dz. No FHx of pulmonary dz.     Smoking: never       Recent travel: within [place]                       Bird exposure: no             Animal exposure: cat        Inhalation exposure: no    Occupation: retired, former                           10 point review of systems negative, aside from that mentioned in HPI.    BP (!) 159/99 (BP Location: Right arm, Patient Position: Sitting, Cuff Size: Adult Regular)   Pulse 92   SpO2 96%   Gen: well-appearing  HEENT: Mallampati IV  Card: RRR  Pulm: clear bilaterally   Abd:  soft  MSK: no edema, no acute joint abnormality   Skin: no obvious rash  Psych: normal affect  Neuro: alert and oriented     Labs:  Personally reviewed  NT-BNP (6/2025) - 3,051    Imaging/Studies: Personally reviewed  PFTs (7/2025) - moderate restriction, normal diffusion   CT Chest (6/2025) - mild to moderate b/l pleural effusion and b/l lower lobe bronchial thickening  CXR (6/2025) - new onset small b/l pleural effusions, mild cardiac enlargement and slight venous congestion   CXR (6/2023) - clear     Stress TTE (6/2025) - normal stress echo, mild LV wall thickening, LA appears enlarged    Past Medical History:   Diagnosis Date     HTN (hypertension)      Hypercholesteremia      Leucopenia      Past Surgical History:   Procedure Laterality Date     NO HISTORY OF SURGERY       Family History   Problem Relation Age of Onset     Diabetes Father      Heart Disease Brother      Social History     Socioeconomic History     Marital status:      Spouse name: Not on file     Number of children: Not on file     Years of education: Not on file     Highest education level: Not on file   Occupational History     Not on file   Tobacco Use     Smoking status: Never     Smokeless tobacco: Never   Vaping Use     Vaping status: Never Used   Substance and Sexual Activity     Alcohol use: Not Currently     Comment: occasionally     Drug use: No     Sexual activity: Not Currently     Partners: Female   Other Topics Concern     Parent/sibling w/ CABG, MI or angioplasty before 65F 55M? Not Asked   Social History Narrative     Not on file     Social Drivers of Health     Financial Resource Strain: Low Risk  (4/23/2025)    Financial Resource Strain      Within the past 12 months, have you or your family members you live with been unable to get utilities (heat, electricity) when it was really needed?: No   Food Insecurity: Low Risk  (4/23/2025)    Food Insecurity      Within the past 12 months, did you worry that your food would run  out before you got money to buy more?: No      Within the past 12 months, did the food you bought just not last and you didn t have money to get more?: No   Transportation Needs: Low Risk  (4/23/2025)    Transportation Needs      Within the past 12 months, has lack of transportation kept you from medical appointments, getting your medicines, non-medical meetings or appointments, work, or from getting things that you need?: No   Physical Activity: Unknown (4/23/2025)    Exercise Vital Sign      Days of Exercise per Week: 4 days      Minutes of Exercise per Session: Not on file   Stress: No Stress Concern Present (4/23/2025)    [employer] - Occupational Stress Questionnaire      Feeling of Stress : Not at all   Social Connections: Unknown (4/23/2025)    Social Connection and Isolation Panel [NHANES]      Frequency of Communication with Friends and Family: Not on file      Frequency of Social Gatherings with Friends and Family: Once a week      Attends Christian Services: Not on file      Active Member of Clubs or Organizations: Not on file      Attends Club or Organization Meetings: Not on file      Marital Status: Not on file   Interpersonal Safety: Low Risk  (4/23/2025)    Interpersonal Safety      Do you feel physically and emotionally safe where you currently live?: Yes      Within the past 12 months, have you been hit, slapped, kicked or otherwise physically hurt by someone?: No      Within the past 12 months, have you been humiliated or emotionally abused in other ways by your partner or ex-partner?: No   Housing Stability: Low Risk  (4/23/2025)    Housing Stability      Do you have housing? : Yes      Are you worried about losing your housing?: No       50 minutes spent reviewing chart, reviewing test results, talking with and examining patient, formulating plan, and documentation on the day of the encounter.    Erick Alfonso MD  Pulmonary and Critical Care Medicine  [facility]  [place]       Again, thank you for allowing me to participate in the care of your patient.        Sincerely,        Erick Alfonso MD    Electronically signed

## 2025-07-01 NOTE — NURSING NOTE
Chief Complaint   Patient presents with    New Pulmonary     Medications reviewed and vital signs taken.   Yovany Laguna, EMT

## 2025-07-01 NOTE — TELEPHONE ENCOUNTER
I want to check his BMP one more time this week ? Today or tomorrow can he come ?    Thanks  DR GORDON

## 2025-07-02 LAB
DLCOUNC-%PRED-PRE: 91 %
DLCOUNC-PRE: 22.23 ML/MIN/MMHG
DLCOUNC-PRED: 24.26 ML/MIN/MMHG
ERV-%PRED-PRE: 58 %
ERV-PRE: 0.78 L
ERV-PRED: 1.33 L
EXPTIME-PRE: 4.18 SEC
FEF2575-%PRED-PRE: 146 %
FEF2575-PRE: 3.03 L/SEC
FEF2575-PRED: 2.06 L/SEC
FEFMAX-%PRED-PRE: 123 %
FEFMAX-PRE: 9.34 L/SEC
FEFMAX-PRED: 7.59 L/SEC
FEV1-%PRED-PRE: 80 %
FEV1-PRE: 2.21 L
FEV1FEV6-PRE: 86 %
FEV1FEV6-PRED: 77 %
FEV1FVC-PRE: 86 %
FEV1FVC-PRED: 76 %
FEV1SVC-PRE: 85 %
FEV1SVC-PRED: 69 %
FIFMAX-PRE: 4.95 L/SEC
FRCPLETH-%PRED-PRE: 83 %
FRCPLETH-PRE: 3.04 L
FRCPLETH-PRED: 3.62 L
FVC-%PRED-PRE: 71 %
FVC-PRE: 2.57 L
FVC-PRED: 3.6 L
IC-%PRED-PRE: 68 %
IC-PRE: 1.83 L
IC-PRED: 2.67 L
RVPLETH-%PRED-PRE: 83 %
RVPLETH-PRE: 2.26 L
RVPLETH-PRED: 2.7 L
TLCPLETH-%PRED-PRE: 70 %
TLCPLETH-PRE: 4.87 L
TLCPLETH-PRED: 6.87 L
VA-%PRED-PRE: 66 %
VA-PRE: 4.08 L
VC-%PRED-PRE: 66 %
VC-PRE: 2.61 L
VC-PRED: 3.94 L

## 2025-07-02 NOTE — TELEPHONE ENCOUNTER
Attempted to call patient. Left message for patient.    Sylvia Verduzco, RN, BSN  Cardiology RN Care Coordinator   [place]   Phone: [phone]  Fax: [phone] ([place]) [phone] ([place])

## 2025-07-07 ENCOUNTER — HOSPITAL ENCOUNTER (OUTPATIENT)
Dept: NUCLEAR MEDICINE | Facility: HOSPITAL | Age: 75
Discharge: HOME | End: 2025-07-07
Attending: INTERNAL MEDICINE
Payer: COMMERCIAL

## 2025-07-07 DIAGNOSIS — I50.30 HEART FAILURE WITH PRESERVED EJECTION FRACTION, NYHA CLASS II (H): ICD-10-CM

## 2025-07-07 PROCEDURE — A9561 TC99M OXIDRONATE: HCPCS | Performed by: INTERNAL MEDICINE

## 2025-07-07 PROCEDURE — 78830 RP LOCLZJ TUM SPECT W/CT 1: CPT

## 2025-07-07 PROCEDURE — 343N000001 HC RX 343 MED OP 636: Performed by: INTERNAL MEDICINE

## 2025-07-07 RX ADMIN — TECHNETIUM TC 99M OXIDRONATE 14.9 MILLICURIE: 3.15 INJECTION, POWDER, LYOPHILIZED, FOR SOLUTION INTRAVENOUS at 11:05

## 2025-07-08 NOTE — TELEPHONE ENCOUNTER
Attempted to call patient. Unable to speak with patient at this time.    Sylvia Verduzco, RN, BSN  Cardiology RN Care Coordinator   [place]   Phone: [phone]  Fax: [phone] ([place]) [phone] ([place])

## 2025-07-09 NOTE — TELEPHONE ENCOUNTER
See telephone encounter for further follow up.    Sylvia Verduzco, RN, BSN  Cardiology RN Care Coordinator   [place]   Phone: [phone]  Fax: [phone] ([place]) [phone] ([place])

## 2025-07-28 DIAGNOSIS — Z82.49 FAMILY HISTORY OF CORONARY ARTERY DISEASE: ICD-10-CM

## 2025-07-28 DIAGNOSIS — R93.1 ELEVATED CORONARY ARTERY CALCIUM SCORE: ICD-10-CM

## 2025-07-28 DIAGNOSIS — N40.0 BENIGN NON-NODULAR PROSTATIC HYPERPLASIA WITHOUT LOWER URINARY TRACT SYMPTOMS: Primary | ICD-10-CM

## 2025-07-28 DIAGNOSIS — I10 HYPERTENSION GOAL BP (BLOOD PRESSURE) < 140/90: ICD-10-CM

## 2025-07-28 RX ORDER — SPIRONOLACTONE 25 MG/1
25 TABLET ORAL DAILY
Qty: 90 TABLET | Refills: 0 | Status: SHIPPED | OUTPATIENT
Start: 2025-07-28

## 2025-07-28 RX ORDER — AMLODIPINE BESYLATE 2.5 MG/1
2.5 TABLET ORAL DAILY
Qty: 90 TABLET | Refills: 0 | Status: SHIPPED | OUTPATIENT
Start: 2025-07-28

## 2025-07-28 RX ORDER — ROSUVASTATIN CALCIUM 10 MG/1
10 TABLET, COATED ORAL DAILY
Qty: 90 TABLET | Refills: 0 | Status: SHIPPED | OUTPATIENT
Start: 2025-07-28

## 2025-07-28 RX ORDER — FINASTERIDE 5 MG/1
1 TABLET, FILM COATED ORAL DAILY
Qty: 90 TABLET | Refills: 0 | Status: SHIPPED | OUTPATIENT
Start: 2025-07-28

## 2025-07-30 DIAGNOSIS — I50.30 HEART FAILURE WITH PRESERVED EJECTION FRACTION, NYHA CLASS II (H): Primary | ICD-10-CM

## 2025-08-05 ENCOUNTER — LAB (OUTPATIENT)
Dept: LAB | Facility: CLINIC | Age: 75
End: 2025-08-05
Payer: COMMERCIAL

## 2025-08-05 DIAGNOSIS — I50.30 HEART FAILURE WITH PRESERVED EJECTION FRACTION, NYHA CLASS II (H): ICD-10-CM

## 2025-08-05 LAB
ANION GAP SERPL CALCULATED.3IONS-SCNC: 9 MMOL/L (ref 7–15)
BUN SERPL-MCNC: 23 MG/DL (ref 8–23)
CALCIUM SERPL-MCNC: 9.8 MG/DL (ref 8.8–10.4)
CHLORIDE SERPL-SCNC: 104 MMOL/L (ref 98–107)
CREAT SERPL-MCNC: 1.38 MG/DL (ref 0.67–1.17)
EGFRCR SERPLBLD CKD-EPI 2021: 54 ML/MIN/1.73M2
GLUCOSE SERPL-MCNC: 94 MG/DL (ref 70–99)
HCO3 SERPL-SCNC: 27 MMOL/L (ref 22–29)
NT-PROBNP SERPL-MCNC: 2214 PG/ML (ref 0–229)
POTASSIUM SERPL-SCNC: 4.3 MMOL/L (ref 3.4–5.3)
SODIUM SERPL-SCNC: 140 MMOL/L (ref 135–145)

## 2025-08-05 PROCEDURE — 80048 BASIC METABOLIC PNL TOTAL CA: CPT

## 2025-08-05 PROCEDURE — 36415 COLL VENOUS BLD VENIPUNCTURE: CPT

## 2025-08-05 PROCEDURE — 83880 ASSAY OF NATRIURETIC PEPTIDE: CPT

## 2025-08-07 ENCOUNTER — OFFICE VISIT (OUTPATIENT)
Dept: CARDIOLOGY | Facility: CLINIC | Age: 75
End: 2025-08-07
Payer: COMMERCIAL

## 2025-08-07 VITALS
SYSTOLIC BLOOD PRESSURE: 114 MMHG | BODY MASS INDEX: 27.68 KG/M2 | HEART RATE: 70 BPM | WEIGHT: 186 LBS | DIASTOLIC BLOOD PRESSURE: 69 MMHG | OXYGEN SATURATION: 97 %

## 2025-08-07 DIAGNOSIS — I10 HYPERTENSION, UNSPECIFIED TYPE: ICD-10-CM

## 2025-08-07 DIAGNOSIS — I50.30 HEART FAILURE WITH PRESERVED EJECTION FRACTION, NYHA CLASS II (H): Primary | ICD-10-CM

## 2025-08-07 RX ORDER — BUMETANIDE 0.5 MG/1
0.5 TABLET ORAL DAILY
Qty: 45 TABLET | Refills: 3 | Status: CANCELLED | OUTPATIENT
Start: 2025-08-07

## 2025-08-07 RX ORDER — BUMETANIDE 0.5 MG/1
0.5 TABLET ORAL DAILY
Qty: 90 TABLET | Refills: 3 | Status: SHIPPED | OUTPATIENT
Start: 2025-08-07

## 2025-08-14 ENCOUNTER — CARE COORDINATION (OUTPATIENT)
Dept: CARDIOLOGY | Facility: CLINIC | Age: 75
End: 2025-08-14
Payer: COMMERCIAL

## 2025-08-21 ENCOUNTER — CARE COORDINATION (OUTPATIENT)
Dept: CARDIOLOGY | Facility: CLINIC | Age: 75
End: 2025-08-21
Payer: COMMERCIAL